# Patient Record
Sex: FEMALE | Race: OTHER | Employment: FULL TIME | URBAN - METROPOLITAN AREA
[De-identification: names, ages, dates, MRNs, and addresses within clinical notes are randomized per-mention and may not be internally consistent; named-entity substitution may affect disease eponyms.]

---

## 2017-02-23 ENCOUNTER — HOSPITAL ENCOUNTER (OUTPATIENT)
Age: 22
Discharge: HOME | End: 2017-02-23
Payer: COMMERCIAL

## 2017-02-24 LAB — RUBV IGG SER QL: 250.8 IU/ML

## 2017-02-24 PROCEDURE — 86787 VARICELLA-ZOSTER ANTIBODY: CPT

## 2017-02-24 PROCEDURE — 86735 MUMPS ANTIBODY: CPT

## 2017-02-24 PROCEDURE — 86481 TB AG RESPONSE T-CELL SUSP: CPT

## 2017-02-24 PROCEDURE — 86765 RUBEOLA ANTIBODY: CPT

## 2017-02-24 PROCEDURE — 86762 RUBELLA ANTIBODY: CPT

## 2017-02-27 LAB
MUV IGG SER QL: 2.67
T-SPOT TB TEST: NORMAL

## 2017-02-28 ENCOUNTER — OFFICE VISIT (OUTPATIENT)
Dept: OBGYN | Facility: CLINIC | Age: 22
End: 2017-02-28

## 2017-02-28 ENCOUNTER — PROCEDURE VISIT (OUTPATIENT)
Dept: OBGYN | Facility: CLINIC | Age: 22
End: 2017-02-28

## 2017-02-28 VITALS
BODY MASS INDEX: 20.12 KG/M2 | HEIGHT: 61 IN | WEIGHT: 106.6 LBS | SYSTOLIC BLOOD PRESSURE: 102 MMHG | DIASTOLIC BLOOD PRESSURE: 80 MMHG

## 2017-02-28 DIAGNOSIS — R10.2 PELVIC PAIN IN FEMALE: Primary | ICD-10-CM

## 2017-02-28 DIAGNOSIS — Z97.5 IUD (INTRAUTERINE DEVICE) IN PLACE: ICD-10-CM

## 2017-02-28 LAB
MEASLES IMMUNE (IGG): 3.27
VZV IGG SER QL IA: 4.16

## 2017-02-28 PROCEDURE — 99212 OFFICE O/P EST SF 10 MIN: CPT | Performed by: NURSE PRACTITIONER

## 2017-02-28 RX ORDER — IBUPROFEN 800 MG/1
800 TABLET ORAL EVERY 8 HOURS PRN
Qty: 120 TABLET | Refills: 1 | Status: SHIPPED | OUTPATIENT
Start: 2017-02-28 | End: 2019-06-26 | Stop reason: CLARIF

## 2017-03-14 ENCOUNTER — PROCEDURE VISIT (OUTPATIENT)
Dept: OBGYN CLINIC | Age: 22
End: 2017-03-14
Payer: COMMERCIAL

## 2017-03-14 VITALS
WEIGHT: 109.2 LBS | DIASTOLIC BLOOD PRESSURE: 60 MMHG | HEIGHT: 61 IN | SYSTOLIC BLOOD PRESSURE: 122 MMHG | BODY MASS INDEX: 20.62 KG/M2

## 2017-03-14 DIAGNOSIS — Z30.432 ENCOUNTER FOR IUD REMOVAL: Primary | ICD-10-CM

## 2017-03-14 DIAGNOSIS — N92.1 MENORRHAGIA WITH IRREGULAR CYCLE: ICD-10-CM

## 2017-03-14 DIAGNOSIS — N94.6 DYSMENORRHEA: ICD-10-CM

## 2017-03-14 PROCEDURE — 58301 REMOVE INTRAUTERINE DEVICE: CPT | Performed by: NURSE PRACTITIONER

## 2017-08-16 ENCOUNTER — HOSPITAL ENCOUNTER (OUTPATIENT)
Age: 22
Setting detail: SPECIMEN
Discharge: HOME | End: 2017-08-16
Payer: COMMERCIAL

## 2017-08-16 ENCOUNTER — OFFICE VISIT (OUTPATIENT)
Dept: OBGYN CLINIC | Age: 22
End: 2017-08-16
Payer: COMMERCIAL

## 2017-08-16 VITALS
DIASTOLIC BLOOD PRESSURE: 70 MMHG | SYSTOLIC BLOOD PRESSURE: 110 MMHG | BODY MASS INDEX: 20.96 KG/M2 | WEIGHT: 111 LBS | HEIGHT: 61 IN

## 2017-08-16 DIAGNOSIS — Z01.419 PAP SMEAR, AS PART OF ROUTINE GYNECOLOGICAL EXAMINATION: Primary | ICD-10-CM

## 2017-08-16 DIAGNOSIS — Z30.41 SURVEILLANCE FOR BIRTH CONTROL, ORAL CONTRACEPTIVES: ICD-10-CM

## 2017-08-16 PROCEDURE — 99395 PREV VISIT EST AGE 18-39: CPT | Performed by: NURSE PRACTITIONER

## 2017-08-16 ASSESSMENT — ENCOUNTER SYMPTOMS
SHORTNESS OF BREATH: 0
COUGH: 0
COLOR CHANGE: 0
DIARRHEA: 0
NAUSEA: 0
BLOOD IN STOOL: 0
ABDOMINAL DISTENTION: 0
BACK PAIN: 0
CONSTIPATION: 0
WHEEZING: 0
ABDOMINAL PAIN: 0
CHEST TIGHTNESS: 0
VOMITING: 0
PHOTOPHOBIA: 0

## 2017-08-17 LAB
DIRECT EXAM: NORMAL
Lab: NORMAL
SPECIMEN DESCRIPTION: NORMAL
STATUS: NORMAL

## 2017-08-18 LAB
HPV SAMPLE: NORMAL
HPV SOURCE: NORMAL
HPV, GENOTYPE 16: NOT DETECTED
HPV, GENOTYPE 18: NOT DETECTED
HPV, HIGH RISK OTHER: NOT DETECTED
HPV, INTERPRETATION: NORMAL

## 2017-08-21 LAB — CYTOLOGY REPORT: NORMAL

## 2017-10-05 ENCOUNTER — TELEPHONE (OUTPATIENT)
Dept: OBGYN CLINIC | Age: 22
End: 2017-10-05

## 2017-10-05 NOTE — TELEPHONE ENCOUNTER
It is not normal after the period it usually starts before the period.   She may have depression she needs to follow with PCp try a different medication

## 2017-10-05 NOTE — TELEPHONE ENCOUNTER
Mickie Allyson called in stating she has been having depression symptoms right after her period; wanted to know if it is normal with the birth control she is on? She was previously on arpita and had mood swings and saw her PCP. Her PCP put her on zoloft and she stopped taking it because it did not agree with her body. She wanted me to get you a message to see if this is normal and what to do further. Her birth control was recent switched I believe in August...

## 2018-04-16 ENCOUNTER — HOSPITAL ENCOUNTER (OUTPATIENT)
Dept: GENERAL RADIOLOGY | Facility: CLINIC | Age: 23
Discharge: HOME | End: 2018-04-18
Payer: COMMERCIAL

## 2018-04-16 ENCOUNTER — TELEPHONE (OUTPATIENT)
Dept: FAMILY MEDICINE CLINIC | Age: 23
End: 2018-04-16

## 2018-04-16 ENCOUNTER — HOSPITAL ENCOUNTER (OUTPATIENT)
Facility: CLINIC | Age: 23
Discharge: HOME | End: 2018-04-18
Payer: COMMERCIAL

## 2018-04-16 DIAGNOSIS — M79.672 FOOT PAIN, LEFT: ICD-10-CM

## 2018-04-16 DIAGNOSIS — M79.672 FOOT PAIN, LEFT: Primary | ICD-10-CM

## 2018-04-16 PROCEDURE — 73630 X-RAY EXAM OF FOOT: CPT

## 2018-04-18 ENCOUNTER — TELEPHONE (OUTPATIENT)
Dept: OBGYN CLINIC | Age: 23
End: 2018-04-18

## 2018-08-21 ENCOUNTER — OFFICE VISIT (OUTPATIENT)
Dept: OBGYN CLINIC | Age: 23
End: 2018-08-21
Payer: COMMERCIAL

## 2018-08-21 ENCOUNTER — HOSPITAL ENCOUNTER (OUTPATIENT)
Age: 23
Setting detail: SPECIMEN
Discharge: HOME | End: 2018-08-21
Payer: COMMERCIAL

## 2018-08-21 VITALS
HEIGHT: 61 IN | RESPIRATION RATE: 18 BRPM | DIASTOLIC BLOOD PRESSURE: 60 MMHG | HEART RATE: 76 BPM | BODY MASS INDEX: 22.84 KG/M2 | SYSTOLIC BLOOD PRESSURE: 116 MMHG | WEIGHT: 121 LBS

## 2018-08-21 DIAGNOSIS — Z30.41 SURVEILLANCE FOR BIRTH CONTROL, ORAL CONTRACEPTIVES: ICD-10-CM

## 2018-08-21 DIAGNOSIS — Z01.419 PAP SMEAR, AS PART OF ROUTINE GYNECOLOGICAL EXAMINATION: Primary | ICD-10-CM

## 2018-08-21 PROCEDURE — 99395 PREV VISIT EST AGE 18-39: CPT | Performed by: NURSE PRACTITIONER

## 2018-08-21 ASSESSMENT — ENCOUNTER SYMPTOMS
DIARRHEA: 0
COUGH: 0
VOICE CHANGE: 0
PHOTOPHOBIA: 0
TROUBLE SWALLOWING: 0
STRIDOR: 0
WHEEZING: 0
VOMITING: 0
NAUSEA: 0
BACK PAIN: 0
CONSTIPATION: 0
SORE THROAT: 0
COLOR CHANGE: 0
ABDOMINAL PAIN: 0
ABDOMINAL DISTENTION: 0
SHORTNESS OF BREATH: 0

## 2018-09-10 LAB — CYTOLOGY REPORT: NORMAL

## 2018-10-10 DIAGNOSIS — F41.9 ANXIETY: Primary | ICD-10-CM

## 2018-10-10 RX ORDER — BUSPIRONE HYDROCHLORIDE 5 MG/1
5 TABLET ORAL 2 TIMES DAILY
Qty: 60 TABLET | Refills: 5 | Status: SHIPPED | OUTPATIENT
Start: 2018-10-10 | End: 2019-02-11 | Stop reason: SDUPTHER

## 2018-10-10 RX ORDER — BUSPIRONE HYDROCHLORIDE 5 MG/1
5 TABLET ORAL 2 TIMES DAILY
Qty: 60 TABLET | Refills: 5 | Status: SHIPPED | OUTPATIENT
Start: 2018-10-10 | End: 2018-10-10 | Stop reason: CLARIF

## 2019-01-02 DIAGNOSIS — R09.89 CHEST CONGESTION: ICD-10-CM

## 2019-01-02 DIAGNOSIS — J01.00 ACUTE NON-RECURRENT MAXILLARY SINUSITIS: ICD-10-CM

## 2019-01-02 DIAGNOSIS — R05.9 COUGH: Primary | ICD-10-CM

## 2019-01-02 RX ORDER — AZITHROMYCIN 250 MG/1
TABLET, FILM COATED ORAL
Qty: 1 PACKET | Refills: 0 | Status: SHIPPED | OUTPATIENT
Start: 2019-01-02 | End: 2019-06-26 | Stop reason: CLARIF

## 2019-01-02 RX ORDER — BROMPHENIRAMINE MALEATE, PSEUDOEPHEDRINE HYDROCHLORIDE, AND DEXTROMETHORPHAN HYDROBROMIDE 2; 30; 10 MG/5ML; MG/5ML; MG/5ML
5 SYRUP ORAL 4 TIMES DAILY PRN
Qty: 180 ML | Refills: 2 | Status: SHIPPED | OUTPATIENT
Start: 2019-01-02 | End: 2019-06-26 | Stop reason: CLARIF

## 2019-01-02 RX ORDER — FLUTICASONE PROPIONATE 50 MCG
1 SPRAY, SUSPENSION (ML) NASAL 2 TIMES DAILY
Qty: 1 BOTTLE | Refills: 2 | Status: SHIPPED | OUTPATIENT
Start: 2019-01-02 | End: 2019-06-26 | Stop reason: CLARIF

## 2019-02-11 DIAGNOSIS — F41.9 ANXIETY: ICD-10-CM

## 2019-02-11 RX ORDER — BUSPIRONE HYDROCHLORIDE 5 MG/1
5 TABLET ORAL 2 TIMES DAILY
Qty: 60 TABLET | Refills: 5 | Status: SHIPPED | OUTPATIENT
Start: 2019-02-11 | End: 2019-03-13

## 2019-04-28 ENCOUNTER — OFFICE VISIT (OUTPATIENT)
Dept: FAMILY MEDICINE CLINIC | Age: 24
End: 2019-04-28
Payer: COMMERCIAL

## 2019-04-28 DIAGNOSIS — T14.8XXA BONE BRUISE: ICD-10-CM

## 2019-04-28 DIAGNOSIS — S69.91XA INJURY OF RIGHT WRIST, INITIAL ENCOUNTER: Primary | ICD-10-CM

## 2019-04-28 PROCEDURE — 99213 OFFICE O/P EST LOW 20 MIN: CPT | Performed by: NURSE PRACTITIONER

## 2019-04-28 RX ORDER — BUSPIRONE HYDROCHLORIDE 5 MG/1
5 TABLET ORAL 2 TIMES DAILY
COMMUNITY
End: 2020-08-05 | Stop reason: SDUPTHER

## 2019-04-28 RX ORDER — ACETAMINOPHEN AND CODEINE PHOSPHATE 300; 30 MG/1; MG/1
1 TABLET ORAL EVERY 4 HOURS PRN
Qty: 18 TABLET | Refills: 0 | Status: SHIPPED | OUTPATIENT
Start: 2019-04-28 | End: 2019-04-29

## 2019-04-28 ASSESSMENT — PATIENT HEALTH QUESTIONNAIRE - PHQ9
2. FEELING DOWN, DEPRESSED OR HOPELESS: 0
SUM OF ALL RESPONSES TO PHQ QUESTIONS 1-9: 0
1. LITTLE INTEREST OR PLEASURE IN DOING THINGS: 0
SUM OF ALL RESPONSES TO PHQ9 QUESTIONS 1 & 2: 0
SUM OF ALL RESPONSES TO PHQ QUESTIONS 1-9: 0

## 2019-04-29 ASSESSMENT — ENCOUNTER SYMPTOMS
CONSTIPATION: 0
DIARRHEA: 0
SORE THROAT: 0
NAUSEA: 1
EYE DISCHARGE: 0
SHORTNESS OF BREATH: 0
COUGH: 0
RHINORRHEA: 0
EYE REDNESS: 0
ABDOMINAL PAIN: 0
EYE ITCHING: 0

## 2019-04-30 NOTE — PROGRESS NOTES
[address] [facility]   [address] [address]  Dept: [phone]  Dept Fax: [phone]    Kayla Michel is a 26 y.o. female who presents today forher medical conditions/complaints as noted below. Kayla Michel is c/o of   Chief Complaint   Patient presents with    Wrist Pain     right wrist pain x one day, Patient fell on wrist     HPI:     Patient presents in office today after injuring her right wrist. Patient was at home just hours ago trying to carry her dog up the stairs and slipped. Wanted to protect her dog so fall was not broken by out streched hand, instead her right wrist was hit on stair and this is what occurred. Pain is severe, tried to take both ibuprofen and tylenol without relief. Xray preliminary doesn't show anything significant. Hand is cold and hard to move. No swelling or redness. Doesn't like pain medication but worried about going to work on Tuesday if it isn't fractured. 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 No other concerns right now.        Past Medical History:   Diagnosis Date    Dysmenorrhea     Menorrhagia       Past Surgical History:   Procedure Laterality Date    ENDOMETRIAL BIOPSY  5/5/16    INTRAUTERINE DEVICE INSERTION  5/5/16     [place]    INTRAUTERINE DEVICE REMOVAL  03/14/2017       Family History   Problem Relation Age of Onset    High Blood Pressure Paternal Grandmother     Heart Disease Paternal Grandmother     Alcohol Abuse Mother     Hypertension Father     Diabetes Father         borderline    Cancer Paternal Aunt 61        [place]    Arthritis Maternal Grandmother     Osteoporosis Maternal Grandmother     Osteoarthritis Maternal Grandmother     Heart Disease Paternal Grandfather     High Blood Pressure Paternal Grandfather        Social History     Tobacco Use    Smoking status: Never Smoker    Smokeless tobacco: Never Used   Substance Use Topics    Alcohol use: Yes     Alcohol/week: 0.0 oz      Current Outpatient Medications   Medication Sig Dispense Refill    busPIRone (BUSPAR) 5 MG tablet Take 5 mg by mouth 2 times daily      norethindrone-ethinyl estradiol (Martha Lang 1/35, 28,) 1-35 MG-MCG per tablet Take 1 tablet by mouth daily 28 tablet 11    ibuprofen (ADVIL;MOTRIN) 800 MG tablet Take 1 tablet by mouth every 8 hours as needed for Pain 120 tablet 1    azithromycin (ZITHROMAX Z-RAHEEM) 250 MG tablet Take 2 tablets (500 mg) on Day 1, and then take 1 tablet (250 mg) on days 2 through 5. 1 packet 0    brompheniramine-pseudoephedrine-DM 30-2-10 MG/5ML syrup Take 5 mLs by mouth 4 times daily as needed for Cough 180 mL 2    fluticasone (FLONASE) 50 MCG/ACT nasal spray 1 spray by Nasal route 2 times daily for 14 days 1 Bottle 2     No current facility-administered medications for this visit. No Known Allergies        Subjective:      Review of Systems   Constitutional: Positive for activity change (due to pain). Negative for appetite change, fatigue and fever. HENT: Negative for congestion, ear pain, postnasal drip, rhinorrhea and sore throat. Eyes: Negative for discharge, redness and itching. Respiratory: Negative for cough and shortness of breath. Cardiovascular: Negative for chest pain. Gastrointestinal: Positive for nausea (slightly due to pain). Negative for abdominal pain, constipation and diarrhea. Genitourinary: Negative for dysuria. Musculoskeletal: Positive for arthralgias (right wrist since injury). Negative for myalgias. Skin: Negative for rash. Neurological: Negative for dizziness, light-headedness and headaches. Psychiatric/Behavioral: Negative for dysphoric mood and sleep disturbance. The patient is not nervous/anxious. Objective:      Physical Exam   Constitutional: She is oriented to person, place, and time. She appears well-developed and well-nourished. She is active. No distress.    HENT:   Head: Normocephalic and atraumatic. Right Ear: Tympanic membrane and external ear normal.   Left Ear: Tympanic membrane and external ear normal.   Nose: Nose normal.   Mouth/Throat: Uvula is midline and oropharynx is clear and moist. No oropharyngeal exudate. Eyes: Pupils are equal, round, and reactive to light. Right eye exhibits no discharge. Left eye exhibits no discharge. Cardiovascular: Normal rate, regular rhythm and normal heart sounds. No murmur heard. Pulses:       Radial pulses are 2+ on the right side, and 2+ on the left side. Pulmonary/Chest: Effort normal and breath sounds normal. No respiratory distress. She has no decreased breath sounds. She has no wheezes. Abdominal: Soft. Bowel sounds are normal.   Musculoskeletal:        Right wrist: She exhibits decreased range of motion, tenderness and bony tenderness. She exhibits no swelling, no effusion, no crepitus and no deformity. No visible red or swollen joints to bilateral upper and lower extremities. Right wrist is significantly cooler than left, pulses intact, tremor lightly on assessment, extremely tender to touch on medical aspect of wrist in AP, no swelling, redness, deformity noticed. Tearful on assessment. Neurological: She is alert and oriented to person, place, and time. She has normal strength. Skin: Skin is warm, dry and intact. No rash noted. Psychiatric: She has a normal mood and affect. Her speech is normal and behavior is normal.   Vitals reviewed. There were no vitals taken for this visit. Assessment:       Diagnosis Orders   1. Injury of right wrist, initial encounter  XR WRIST RIGHT (MIN 3 VIEWS)    DISCONTINUED: acetaminophen-codeine (TYLENOL/CODEINE #3) 300-30 MG per tablet   2. Bone bruise             Plan:     Problem List     None        MA did not place vitals in visit before completion, will work on getting these updated. Xray was non concerning.    Recommended a wrist splint due to severe pain especially to be worn for work.   Jessi Paredes, finalized and no fracture. Okay to take pain medication OTC, prescription available for  if this doesn't provide relief or needs it at work. Alert if symptoms don't continue or improve. Encouraged healthy diet and exercise. Call office with any new or worsening symptoms or concerns. Patient given educationalmaterials - see patient instructions. Discussed use, benefit, and side effectsof prescribed medications. All patient questions answered. Pt verbalized understanding. Instructed to continue current medications, diet and exercise. Patient agreedwith treatment plan. Follow up as directed.      Electronically signed by TERRENCE Clark CNP on 4/29/2019 at 8:30 PM

## 2019-06-27 ENCOUNTER — HOSPITAL ENCOUNTER (OUTPATIENT)
Age: 24
Setting detail: SPECIMEN
Discharge: HOME | End: 2019-06-27
Payer: COMMERCIAL

## 2019-07-29 ENCOUNTER — HOSPITAL ENCOUNTER (OUTPATIENT)
Age: 24
Setting detail: SPECIMEN
Discharge: HOME | End: 2019-07-29
Payer: COMMERCIAL

## 2019-07-29 DIAGNOSIS — Z02.0 SCHOOL PHYSICAL EXAM: ICD-10-CM

## 2019-07-29 LAB — HBV SURFACE AB TITR SER: 4.06 MIU/ML

## 2019-08-01 LAB — T-SPOT TB TEST: NORMAL

## 2019-08-21 ASSESSMENT — ENCOUNTER SYMPTOMS
COLOR CHANGE: 0
CONSTIPATION: 0
NAUSEA: 0
RHINORRHEA: 0
DIARRHEA: 0
ABDOMINAL PAIN: 0
SHORTNESS OF BREATH: 0
VOMITING: 0
BACK PAIN: 0
COUGH: 0

## 2019-08-22 ENCOUNTER — OFFICE VISIT (OUTPATIENT)
Dept: OBGYN CLINIC | Age: 24
End: 2019-08-22
Payer: COMMERCIAL

## 2019-08-22 ENCOUNTER — HOSPITAL ENCOUNTER (OUTPATIENT)
Age: 24
Setting detail: SPECIMEN
Discharge: HOME | End: 2019-08-22
Payer: COMMERCIAL

## 2019-08-22 VITALS — WEIGHT: 128 LBS | BODY MASS INDEX: 23.86 KG/M2 | DIASTOLIC BLOOD PRESSURE: 62 MMHG | SYSTOLIC BLOOD PRESSURE: 110 MMHG

## 2019-08-22 DIAGNOSIS — Z01.419 VISIT FOR GYNECOLOGIC EXAMINATION: Primary | ICD-10-CM

## 2019-08-22 DIAGNOSIS — N92.6 IRREGULAR MENSTRUAL BLEEDING: ICD-10-CM

## 2019-08-22 LAB
DIRECT EXAM: NORMAL
Lab: NORMAL
SPECIMEN DESCRIPTION: NORMAL

## 2019-08-22 PROCEDURE — 99395 PREV VISIT EST AGE 18-39: CPT | Performed by: NURSE PRACTITIONER

## 2019-08-22 NOTE — PATIENT INSTRUCTIONS
license by [facility] ([facility]). If you have questions about a medical condition or this instruction, always ask your healthcare professional. [address] any warranty or liability for your use of this information. Pap Test: Care Instructions  Your Care Instructions    The Pap test (also called a Pap smear) is a screening test for cancer of the cervix, which is the lower part of the uterus that opens into the vagina. The test can help your doctor find early changes in the cells that could lead to cancer. The sample of cells taken during your test has been sent to a lab so that an expert can look at the cells. It usually takes a week or two to get the results back. Follow-up care is a key part of your treatment and safety. Be sure to make and go to all appointments, and call your doctor if you are having problems. It's also a good idea to know your test results and keep a list of the medicines you take. What do the results mean? · A normal result means that the test did not find any abnormal cells in the sample. · An abnormal result can mean many things. Most of these are not cancer. The results of your test may be abnormal because:  ? You have an infection of the vagina or cervix, such as a yeast infection. ? You have an IUD (intrauterine device for birth control). ? You have low estrogen levels after menopause that are causing the cells to change. ? You have cell changes that may be a sign of precancer or cancer. The results are ranked based on how serious the changes might be. There are many other reasons why you might not get a normal result. If the results were abnormal, you may need to get another test within a few weeks or months. If the results show changes that could be a sign of cancer, you may need a test called a colposcopy, which provides a more complete view of the cervix.   Sometimes the lab cannot use the sample because it does not contain enough cells or was not preserved well. If so, you may need to have the test again. This is not common, but it does happen from time to time. When should you call for help? Watch closely for changes in your health, and be sure to contact your doctor if:    · You have vaginal bleeding or pain for more than 2 days after the test. It is normal to have a small amount of bleeding for a day or two after the test.   Where can you learn more? Go to [email or web address] and sign in to your [employer] account. Enter E173 in the [employer] box to learn more about \"Pap Test: Care Instructions. \"     If you do not have an account, please click on the \"Sign Up Now\" link. Current as of: December 19, 2018  Content Version: 12.1  © 0826-9335 Healthwise, Incorporated. Care instructions adapted under license by [facility] ([facility]). If you have questions about a medical condition or this instruction, always ask your healthcare professional. [place] [address] any warranty or liability for your use of this information.

## 2019-08-23 LAB
CHLAMYDIA BY THIN PREP: NEGATIVE
N. GONORRHOEAE DNA, THIN PREP: NEGATIVE
SPECIMEN DESCRIPTION: NORMAL

## 2019-08-29 LAB — CYTOLOGY REPORT: NORMAL

## 2019-09-16 ENCOUNTER — HOSPITAL ENCOUNTER (OUTPATIENT)
Age: 24
Setting detail: SPECIMEN
Discharge: HOME | End: 2019-09-16
Payer: COMMERCIAL

## 2019-09-16 DIAGNOSIS — Z23 NEED FOR TUBERCULOSIS VACCINATION: ICD-10-CM

## 2019-09-16 DIAGNOSIS — R89.9 ABNORMAL LABORATORY TEST RESULT: ICD-10-CM

## 2019-09-26 ENCOUNTER — HOSPITAL ENCOUNTER (OUTPATIENT)
Age: 24
Discharge: HOME | End: 2019-09-26
Payer: COMMERCIAL

## 2020-02-25 ENCOUNTER — APPOINTMENT (OUTPATIENT)
Dept: GENERAL RADIOLOGY | Age: 25
End: 2020-02-25
Payer: COMMERCIAL

## 2020-02-25 ENCOUNTER — HOSPITAL ENCOUNTER (EMERGENCY)
Age: 25
Discharge: HOME | End: 2020-02-25
Attending: EMERGENCY MEDICINE
Payer: COMMERCIAL

## 2020-02-25 VITALS
BODY MASS INDEX: 24.55 KG/M2 | TEMPERATURE: 97.9 F | SYSTOLIC BLOOD PRESSURE: 128 MMHG | HEIGHT: 61 IN | DIASTOLIC BLOOD PRESSURE: 82 MMHG | HEART RATE: 77 BPM | RESPIRATION RATE: 16 BRPM | OXYGEN SATURATION: 99 % | WEIGHT: 130 LBS

## 2020-02-25 PROCEDURE — 71046 X-RAY EXAM CHEST 2 VIEWS: CPT

## 2020-02-25 PROCEDURE — 99283 EMERGENCY DEPT VISIT LOW MDM: CPT

## 2020-02-25 PROCEDURE — 70360 X-RAY EXAM OF NECK: CPT

## 2020-02-25 PROCEDURE — 6370000000 HC RX 637 (ALT 250 FOR IP): Performed by: EMERGENCY MEDICINE

## 2020-02-25 RX ORDER — PREDNISONE 10 MG/1
TABLET ORAL
Qty: 20 TABLET | Refills: 0 | Status: SHIPPED | OUTPATIENT
Start: 2020-02-25 | End: 2020-03-06

## 2020-02-25 RX ORDER — PREDNISONE 20 MG/1
60 TABLET ORAL ONCE
Status: COMPLETED | OUTPATIENT
Start: 2020-02-25 | End: 2020-02-25

## 2020-02-25 RX ORDER — AZITHROMYCIN 250 MG/1
TABLET, FILM COATED ORAL
Qty: 1 PACKET | Refills: 0 | Status: SHIPPED | OUTPATIENT
Start: 2020-02-25 | End: 2020-03-06

## 2020-02-25 RX ADMIN — PREDNISONE 60 MG: 20 TABLET ORAL at 19:51

## 2020-02-25 ASSESSMENT — ENCOUNTER SYMPTOMS
SHORTNESS OF BREATH: 0
NAUSEA: 0
ABDOMINAL PAIN: 0
BACK PAIN: 0
CONSTIPATION: 0
SINUS PRESSURE: 1
TROUBLE SWALLOWING: 1
COUGH: 0
COLOR CHANGE: 0
SORE THROAT: 0
DIARRHEA: 0
VOMITING: 0
BLOOD IN STOOL: 0

## 2020-02-26 NOTE — ED PROVIDER NOTES
[address]  eMERGENCY dEPARTMENT eNCOUnter    Pt Name: Fredy Herzog  MRN: [id number]  YOB: 1995  Date of evaluation:2/25/20  PCP: Shan Coelho PA-C    CHIEF COMPLAINT       Chief Complaint   Patient presents with    Allergic Reaction       HISTORY OF PRESENT ILLNESS    Fredy Herzog is a 22 y.o. female who presents with a chief complaint of difficulty swallowing. Patient states she was recently diagnosed with a sinus infection yesterday. She was started on amoxicillin. States she took a total of 2 doses today. After the second dose she stated that she felt like she could not swallow and like her throat was closing. She took 1 dose of oral Benadryl at home. Denies any rashes. Denies any chest pain or any actual real difficulty breathing. Has not been wheezing or heard any high-pitched stridorous sounds. Patient has no known allergies. She has had a azithromycin before but does not think she has been on any other antibiotics in the past.  She is still complaining of nasal congestion, sinus pressure but has no other new complaints in regards to that. Symptoms are acute. Symptoms are moderate. Nothing else make symptoms better or worse. Patient has no other complaints at this time. REVIEW OF SYSTEMS       Review of Systems   Constitutional: Negative for chills, fatigue and fever. HENT: Positive for congestion, sinus pressure and trouble swallowing. Negative for ear pain and sore throat. Eyes: Negative for visual disturbance. Respiratory: Negative for cough and shortness of breath. Cardiovascular: Negative for chest pain, palpitations and leg swelling. Gastrointestinal: Negative for abdominal pain, blood in stool, constipation, diarrhea, nausea and vomiting. Genitourinary: Negative for dysuria and flank pain. Musculoskeletal: Negative for arthralgias, back pain, myalgias and neck pain. Skin: Negative for color change, rash and wound.    Neurological: Negative for not think patient has a epiglottitis, retropharyngeal abscess or anything else like that however based on her description of her symptoms I am going to get x-ray of her soft tissues of her neck and chest x-ray. We will also give oral steroids here. She has had Benadryl prior to arrival.    DIAGNOSTIC RESULTS     EKG: All EKG's are interpreted by the Emergency Department Physician who either signs or Co-signs this chart in the absence of a cardiologist.        RADIOLOGY:   I directly visualized the following  images and reviewed the radiologist interpretations:  XR Neck Soft Tissue   Final Result   No radiographic abnormality of the soft tissues of the neck. XR CHEST STANDARD (2 VW)   Final Result   No evidence for acute cardiopulmonary pathology. ED BEDSIDE ULTRASOUND:      LABS:  Labs Reviewed - No data to display      EMERGENCY DEPARTMENT COURSE:   Vitals:    Vitals:    02/25/20 1929 02/25/20 2121   BP: (!) 158/103 128/82   Pulse: 92 77   Resp: 26 16   Temp: 97.9 °F (36.6 °C)    TempSrc: Oral    SpO2: 100% 99%   Weight: 130 lb (59 kg)    Height: 5' 1\" (1.549 m)      11:12 PM  Patient feels a lot better at this time. She is not feeling like she cannot swallow or cannot breathe at all at this time. X-rays are unremarkable. Again there are still no rashes, any other signs of allergic reaction. Regardless I told the patient to stop taking amoxicillin, will switch her antibiotic to azithromycin. She was advised to call her PCP and let them know she was seen for potential allergic reaction. Advised her not to take penicillins until further notice or until she is seen by an allergist.    I advised her to return if any symptoms worsen. Patient's agreeable plan will be discharged home today. CRITICALCARE:      CONSULTS:  None      PROCEDURES:      FINAL IMPRESSION      1. Allergic reaction, initial encounter    2.  Upper respiratory tract infection, unspecified type

## 2020-05-13 ENCOUNTER — TELEPHONE (OUTPATIENT)
Dept: OBGYN CLINIC | Age: 25
End: 2020-05-13

## 2020-05-13 RX ORDER — SULFAMETHOXAZOLE AND TRIMETHOPRIM 800; 160 MG/1; MG/1
1 TABLET ORAL 2 TIMES DAILY
Qty: 20 TABLET | Refills: 0 | Status: SHIPPED | OUTPATIENT
Start: 2020-05-13 | End: 2020-05-23

## 2020-07-22 ENCOUNTER — HOSPITAL ENCOUNTER (OUTPATIENT)
Age: 25
Discharge: HOME | End: 2020-07-22
Payer: COMMERCIAL

## 2020-07-22 PROCEDURE — U0003 INFECTIOUS AGENT DETECTION BY NUCLEIC ACID (DNA OR RNA); SEVERE ACUTE RESPIRATORY SYNDROME CORONAVIRUS 2 (SARS-COV-2) (CORONAVIRUS DISEASE [COVID-19]), AMPLIFIED PROBE TECHNIQUE, MAKING USE OF HIGH THROUGHPUT TECHNOLOGIES AS DESCRIBED BY CMS-2020-01-R: HCPCS

## 2020-07-28 LAB — SARS-COV-2, NAA: DETECTED

## 2020-07-29 ENCOUNTER — TELEPHONE (OUTPATIENT)
Dept: ADMINISTRATIVE | Age: 25
End: 2020-07-29

## 2020-08-01 ENCOUNTER — APPOINTMENT (OUTPATIENT)
Dept: CT IMAGING | Age: 25
End: 2020-08-01
Payer: COMMERCIAL

## 2020-08-01 ENCOUNTER — HOSPITAL ENCOUNTER (EMERGENCY)
Age: 25
Discharge: HOME | End: 2020-08-01
Attending: EMERGENCY MEDICINE
Payer: COMMERCIAL

## 2020-08-01 VITALS
BODY MASS INDEX: 24.58 KG/M2 | SYSTOLIC BLOOD PRESSURE: 115 MMHG | WEIGHT: 130 LBS | OXYGEN SATURATION: 98 % | TEMPERATURE: 98.9 F | HEART RATE: 90 BPM | RESPIRATION RATE: 18 BRPM | DIASTOLIC BLOOD PRESSURE: 77 MMHG

## 2020-08-01 LAB
ABSOLUTE EOS #: 0.1 K/UL (ref 0–0.4)
ABSOLUTE IMMATURE GRANULOCYTE: NORMAL K/UL (ref 0–0.3)
ABSOLUTE LYMPH #: 2.2 K/UL (ref 1–4.8)
ABSOLUTE MONO #: 0.5 K/UL (ref 0.1–1.3)
ALBUMIN SERPL-MCNC: 4.4 G/DL (ref 3.5–5.2)
ALBUMIN/GLOBULIN RATIO: ABNORMAL (ref 1–2.5)
ALP BLD-CCNC: 70 U/L (ref 35–104)
ALT SERPL-CCNC: 42 U/L (ref 5–33)
ANION GAP SERPL CALCULATED.3IONS-SCNC: 14 MMOL/L (ref 9–17)
AST SERPL-CCNC: 22 U/L
BASOPHILS # BLD: 1 % (ref 0–2)
BASOPHILS ABSOLUTE: 0 K/UL (ref 0–0.2)
BILIRUB SERPL-MCNC: 0.39 MG/DL (ref 0.3–1.2)
BUN BLDV-MCNC: 9 MG/DL (ref 6–20)
BUN/CREAT BLD: ABNORMAL (ref 9–20)
C-REACTIVE PROTEIN: <0.3 MG/L (ref 0–5)
CALCIUM SERPL-MCNC: 9.8 MG/DL (ref 8.6–10.4)
CHLORIDE BLD-SCNC: 100 MMOL/L (ref 98–107)
CO2: 24 MMOL/L (ref 20–31)
CREAT SERPL-MCNC: 0.71 MG/DL (ref 0.5–0.9)
D-DIMER QUANTITATIVE: <0.27 MG/L FEU (ref 0–0.59)
DIFFERENTIAL TYPE: NORMAL
EOSINOPHILS RELATIVE PERCENT: 2 % (ref 0–4)
GFR AFRICAN AMERICAN: >60 ML/MIN
GFR NON-AFRICAN AMERICAN: >60 ML/MIN
GFR SERPL CREATININE-BSD FRML MDRD: ABNORMAL ML/MIN/{1.73_M2}
GFR SERPL CREATININE-BSD FRML MDRD: ABNORMAL ML/MIN/{1.73_M2}
GLUCOSE BLD-MCNC: 86 MG/DL (ref 70–99)
HCG QUALITATIVE: NEGATIVE
HCT VFR BLD CALC: 43.5 % (ref 36–46)
HEMOGLOBIN: 15.3 G/DL (ref 12–16)
IMMATURE GRANULOCYTES: NORMAL %
INR BLD: 1
LACTATE DEHYDROGENASE: 124 U/L (ref 135–214)
LIPASE: 26 U/L (ref 13–60)
LYMPHOCYTES # BLD: 34 % (ref 24–44)
MCH RBC QN AUTO: 30.7 PG (ref 26–34)
MCHC RBC AUTO-ENTMCNC: 35.3 G/DL (ref 31–37)
MCV RBC AUTO: 87 FL (ref 80–100)
MONOCYTES # BLD: 7 % (ref 1–7)
NRBC AUTOMATED: NORMAL PER 100 WBC
PARTIAL THROMBOPLASTIN TIME: 33.4 SEC (ref 24–36)
PDW BLD-RTO: 14 % (ref 11.5–14.9)
PLATELET # BLD: 347 K/UL (ref 150–450)
PLATELET ESTIMATE: NORMAL
PMV BLD AUTO: 8.1 FL (ref 6–12)
POTASSIUM SERPL-SCNC: 3.9 MMOL/L (ref 3.7–5.3)
PROCALCITONIN: 0.08 NG/ML
PROTHROMBIN TIME: 13.1 SEC (ref 11.8–14.6)
RBC # BLD: 5 M/UL (ref 4–5.2)
RBC # BLD: NORMAL 10*6/UL
SEG NEUTROPHILS: 56 % (ref 36–66)
SEGMENTED NEUTROPHILS ABSOLUTE COUNT: 3.7 K/UL (ref 1.3–9.1)
SODIUM BLD-SCNC: 138 MMOL/L (ref 135–144)
TOTAL PROTEIN: 7.6 G/DL (ref 6.4–8.3)
TROPONIN INTERP: NORMAL
TROPONIN INTERP: NORMAL
TROPONIN T: NORMAL NG/ML
TROPONIN T: NORMAL NG/ML
TROPONIN, HIGH SENSITIVITY: <6 NG/L (ref 0–14)
TROPONIN, HIGH SENSITIVITY: <6 NG/L (ref 0–14)
WBC # BLD: 6.6 K/UL (ref 3.5–11)
WBC # BLD: NORMAL 10*3/UL

## 2020-08-01 PROCEDURE — 99285 EMERGENCY DEPT VISIT HI MDM: CPT

## 2020-08-01 PROCEDURE — 85730 THROMBOPLASTIN TIME PARTIAL: CPT

## 2020-08-01 PROCEDURE — 2580000003 HC RX 258: Performed by: EMERGENCY MEDICINE

## 2020-08-01 PROCEDURE — 85025 COMPLETE CBC W/AUTO DIFF WBC: CPT

## 2020-08-01 PROCEDURE — 84703 CHORIONIC GONADOTROPIN ASSAY: CPT

## 2020-08-01 PROCEDURE — 85610 PROTHROMBIN TIME: CPT

## 2020-08-01 PROCEDURE — 84145 PROCALCITONIN (PCT): CPT

## 2020-08-01 PROCEDURE — 83690 ASSAY OF LIPASE: CPT

## 2020-08-01 PROCEDURE — 80053 COMPREHEN METABOLIC PANEL: CPT

## 2020-08-01 PROCEDURE — 36415 COLL VENOUS BLD VENIPUNCTURE: CPT

## 2020-08-01 PROCEDURE — 83615 LACTATE (LD) (LDH) ENZYME: CPT

## 2020-08-01 PROCEDURE — 84484 ASSAY OF TROPONIN QUANT: CPT

## 2020-08-01 PROCEDURE — 93005 ELECTROCARDIOGRAM TRACING: CPT | Performed by: EMERGENCY MEDICINE

## 2020-08-01 PROCEDURE — 71260 CT THORAX DX C+: CPT

## 2020-08-01 PROCEDURE — 86140 C-REACTIVE PROTEIN: CPT

## 2020-08-01 PROCEDURE — 6360000004 HC RX CONTRAST MEDICATION: Performed by: EMERGENCY MEDICINE

## 2020-08-01 PROCEDURE — 85379 FIBRIN DEGRADATION QUANT: CPT

## 2020-08-01 RX ORDER — 0.9 % SODIUM CHLORIDE 0.9 %
80 INTRAVENOUS SOLUTION INTRAVENOUS ONCE
Status: COMPLETED | OUTPATIENT
Start: 2020-08-01 | End: 2020-08-01

## 2020-08-01 RX ORDER — SODIUM CHLORIDE 0.9 % (FLUSH) 0.9 %
10 SYRINGE (ML) INJECTION 2 TIMES DAILY
Status: DISCONTINUED | OUTPATIENT
Start: 2020-08-01 | End: 2020-08-01 | Stop reason: HOSPADM

## 2020-08-01 RX ADMIN — Medication 10 ML: at 13:01

## 2020-08-01 RX ADMIN — SODIUM CHLORIDE 80 ML: 9 INJECTION, SOLUTION INTRAVENOUS at 13:01

## 2020-08-01 RX ADMIN — IOVERSOL 75 ML: 741 INJECTION INTRA-ARTERIAL; INTRAVENOUS at 13:01

## 2020-08-01 ASSESSMENT — ENCOUNTER SYMPTOMS
BLOOD IN STOOL: 0
CONSTIPATION: 0
SHORTNESS OF BREATH: 1
DIARRHEA: 0
NAUSEA: 0
COUGH: 1
COLOR CHANGE: 0
TROUBLE SWALLOWING: 0
SORE THROAT: 0
BACK PAIN: 0
ABDOMINAL PAIN: 0
VOMITING: 0

## 2020-08-01 ASSESSMENT — PAIN DESCRIPTION - FREQUENCY: FREQUENCY: CONTINUOUS

## 2020-08-01 ASSESSMENT — PAIN DESCRIPTION - DESCRIPTORS: DESCRIPTORS: BURNING

## 2020-08-01 ASSESSMENT — PAIN DESCRIPTION - LOCATION: LOCATION: CHEST

## 2020-08-01 ASSESSMENT — PAIN DESCRIPTION - ORIENTATION: ORIENTATION: LEFT;RIGHT

## 2020-08-01 ASSESSMENT — PAIN SCALES - GENERAL: PAINLEVEL_OUTOF10: 8

## 2020-08-01 ASSESSMENT — PAIN DESCRIPTION - PAIN TYPE: TYPE: ACUTE PAIN

## 2020-08-01 NOTE — ED PROVIDER NOTES
[address]  eMERGENCY dEPARTMENT eNCOUnter    Pt Name: Alejandra Mullen  MRN: [id number]  YOB: 1995  Date of evaluation:8/1/20  PCP: Arturo Giordano PA-C    CHIEF COMPLAINT       Chief Complaint   Patient presents with    Shortness of Breath    Chest Pain     burning in chest    Concern For COVID-19       HISTORY OF PRESENT ILLNESS    Alejandra Mullen is a 26 y.o. female who presents with a chief complaint of left-sided chest pain. Also complained of difficulty breathing. Patient is known to be positive for COVID-19. She states that starting last night she started having chest pain on her left side that wraps around her left flank to her back. Inspiration is making her symptoms worse. Also complained of a cough, subjective fever. No GI symptoms. No history of DVT or PE. No recent leg swelling. Symptoms are acute. Symptoms are severe. Nothing make symptoms better or worse other than stated above. Patient has no other complaints at this time. REVIEW OF SYSTEMS       Review of Systems   Constitutional: Positive for fatigue and fever. Negative for chills. HENT: Negative for congestion, ear pain, sore throat and trouble swallowing. Eyes: Negative for visual disturbance. Respiratory: Positive for cough and shortness of breath. Cardiovascular: Positive for chest pain. Negative for palpitations and leg swelling. Gastrointestinal: Negative for abdominal pain, blood in stool, constipation, diarrhea, nausea and vomiting. Genitourinary: Negative for dysuria and flank pain. Musculoskeletal: Negative for arthralgias, back pain, myalgias and neck pain. Skin: Negative for color change, rash and wound. Neurological: Negative for dizziness, weakness, light-headedness, numbness and headaches. Psychiatric/Behavioral: Negative for confusion. All other systems reviewed and are negative. Negativein 10 essential Systems except as mentioned above and in the HPI.         PAST MEDICAL HISTORY     Past Medical History:   Diagnosis Date    Dysmenorrhea     Menorrhagia          SURGICAL HISTORY      has a past surgical history that includes intrauterine device insertion (16 ); Endometrial biopsy (16); and Intrauterine Device Removal (2017). CURRENT MEDICATIONS       Current Discharge Medication List      CONTINUE these medications which have NOT CHANGED    Details   albuterol sulfate HFA (PROVENTIL HFA) 108 (90 Base) MCG/ACT inhaler Inhale 2 puffs into the lungs every 6 hours as needed for Wheezing or Shortness of Breath  Qty: 1 Inhaler, Refills: 0    Associated Diagnoses: COVID-19 virus infection      benzonatate (TESSALON) 100 MG capsule Take 1-2 capsules by mouth 3 times daily as needed for Cough  Qty: 30 capsule, Refills: 0    Associated Diagnoses: COVID-19 virus infection      norethindrone-ethinyl estradiol (Rashmi Kingston , ,) 1-35 MG-MCG per tablet Take 1 tablet by mouth daily  Qty: 28 tablet, Refills: 1      ondansetron (ZOFRAN) 4 MG tablet Take 1 tablet by mouth every 8 hours as needed for Nausea or Vomiting  Qty: 20 tablet, Refills: 0      Lactobacillus (PROBIOTIC ACIDOPHILUS PO) Take by mouth      fluticasone (FLONASE) 50 MCG/ACT nasal spray 1 spray by Each Nostril route daily      Fexofenadine HCl (ALLEGRA ALLERGY PO) Take by mouth      busPIRone (BUSPAR) 5 MG tablet Take 5 mg by mouth 2 times daily             ALLERGIES     is allergic to amoxil [amoxicillin]. FAMILY HISTORY     She indicated that her mother is alive. She indicated that her father is alive. She indicated that her maternal grandmother is alive. She indicated that the status of her maternal grandfather is unknown. She indicated that her paternal grandmother is alive. She indicated that her paternal grandfather is alive. She indicated that her paternal aunt is .      family history includes Alcohol Abuse in her mother; Arthritis in her maternal grandmother; Cancer (age of onset: 61) in her Not in any acute distress. I am concerned for pneumonia, pneumothorax, PE, unlikely dissection. Myocarditis is a possibility as well. We will get lab work, CT scan of the chest.    DIAGNOSTIC RESULTS     EKG: All EKG's are interpreted by the Emergency Department Physician who either signs or Co-signs this chart in the absence of a cardiologist.    EKG Interpretation    Interpreted by me    Rhythm: normal sinus   Rate: normal  Axis: normal  Ectopy: none  Conduction: normal  ST Segments: no acute change  T Waves: no acute change  Q Waves: none    Clinical Impression: no acute changes and normal EKG    RADIOLOGY:   I directly visualized the following  images and reviewed the radiologist interpretations:  CT CHEST PULMONARY EMBOLISM W CONTRAST   Final Result   *No evidence of pulmonary embolism or acute pulmonary abnormality. *2 enhancing lesions are identified within the right lobe of the liver,   largest measuring 3.4 cm. Complete evaluation with CT or MRI utilizing liver   mass protocol is recommended. ED BEDSIDE ULTRASOUND:      LABS:  Labs Reviewed   COMPREHENSIVE METABOLIC PANEL - Abnormal; Notable for the following components:       Result Value    ALT 42 (*)     All other components within normal limits   LACTATE DEHYDROGENASE - Abnormal; Notable for the following components:     (*)     All other components within normal limits   TROPONIN   TROPONIN   CBC WITH AUTO DIFFERENTIAL   LIPASE   D-DIMER, QUANTITATIVE   PROTIME-INR   APTT   HCG, SERUM, QUALITATIVE   PROCALCITONIN   C-REACTIVE PROTEIN         EMERGENCY DEPARTMENT COURSE:   Vitals:    Vitals:    08/01/20 1315 08/01/20 1330 08/01/20 1345 08/01/20 1400   BP: 113/71 122/75 139/83 99/70   Pulse: 82 92 103 81   Resp: 16 15 10 19   Temp:       TempSrc:       SpO2: 98% 98% 98% 97%   Weight:         3:00 PM EDT  CT scan shows no evidence of PE. She does have liver lesions noted.   Patient advised to follow-up with her PCP about this.  Blood work is unremarkable. She is not any respiratory distress. She has no evidence of pneumonia, pneumothorax. Advised to keep taking symptomatic relief at home and to self isolate herself at home is much as possible. Advised return for breathing worsens. She is agreeable with plan will be discharged home today. CRITICALCARE:      CONSULTS:  None      PROCEDURES:      FINAL IMPRESSION      1. Chest pain, unspecified type    2.  COVID-19            DISPOSITION/PLAN   DISPOSITION Decision To Discharge 08/01/2020 02:46:35 PM        PATIENT REFERRED TO:  Renea Irby PA-C  [address] [facility]  [address] [facility]  [address] [facility] [address]  [phone]    Schedule an appointment as soon as possible for a visit       [facility] ED  [facility] [address]  [address] [facility]  [phone]    As needed, If symptoms worsen      DISCHARGE MEDICATIONS:  Current Discharge Medication List          (Please note that portions ofthis note were completed with a voice recognition program.  Efforts were made to edit the dictations but occasionally words are mis-transcribed.)    Ana Peralta DO  Attending Emergency Physician          Ana Peralta DO  08/01/20 1500

## 2020-08-03 ENCOUNTER — CARE COORDINATION (OUTPATIENT)
Dept: CARE COORDINATION | Age: 25
End: 2020-08-03

## 2020-08-03 LAB
EKG ATRIAL RATE: 91 BPM
EKG P AXIS: 63 DEGREES
EKG P-R INTERVAL: 130 MS
EKG Q-T INTERVAL: 352 MS
EKG QRS DURATION: 80 MS
EKG QTC CALCULATION (BAZETT): 432 MS
EKG R AXIS: 44 DEGREES
EKG T AXIS: 55 DEGREES
EKG VENTRICULAR RATE: 91 BPM

## 2020-08-03 PROCEDURE — 93010 ELECTROCARDIOGRAM REPORT: CPT | Performed by: INTERNAL MEDICINE

## 2020-08-03 NOTE — CARE COORDINATION
Patient contacted regarding [id number] diagnosis\". Discussed COVID-19 related testing which was available at this time. Test results were positive. Patient informed of results, if available? Patient aware of positive results from 2020     Care Transition Nurse/ Ambulatory Care Manager contacted the patient by telephone to perform post discharge assessment. Verified name and  with patient as identifiers. Provided introduction to self, and explanation of the CTN/ACM role, and reason for call due to risk factors for infection and/or exposure to COVID-19. Symptoms reviewed with patient who verbalized the following symptoms: cough, shortness of breath, no new symptoms, no worsening symptoms and chest burning. Due to no new or worsening symptoms encounter was not routed to provider for escalation. Discussed follow-up appointments. If no appointment was previously scheduled, appointment scheduling offered: patient has VV with PCP 2020  [facility] follow up appointment(s):   Future Appointments   Date Time Provider Danni Salgado   2020  1:30 PM Bubba Tadeo [address] [facility]   2020  9:30 AM Andre Landau, APRN - CNP Pola Masters [facility]     Non-[facility] follow up appointment(s): no     Advance Care Planning:   Does patient have an Advance Directive:  did not review. Patient has following risk factors of: no known risk factors. CTN/ACM reviewed discharge instructions, medical action plan and red flags such as increased shortness of breath, increasing fever and signs of decompensation with patient who verbalized understanding. Discussed exposure protocols and quarantine with CDC Guidelines What to do if you are sick with coronavirus disease .  Patient was given an opportunity for questions and concerns.  The patient agrees to contact the [facility] exposure line [phone], local health department [place] Department of Health: ([phone]) and PCP office for questions related to

## 2020-08-05 ENCOUNTER — CARE COORDINATION (OUTPATIENT)
Dept: CARE COORDINATION | Age: 25
End: 2020-08-05

## 2020-08-09 ENCOUNTER — HOSPITAL ENCOUNTER (EMERGENCY)
Age: 25
Discharge: ANOTHER HOSPITAL | End: 2020-08-10
Attending: EMERGENCY MEDICINE
Payer: COMMERCIAL

## 2020-08-09 ENCOUNTER — APPOINTMENT (OUTPATIENT)
Dept: CT IMAGING | Age: 25
End: 2020-08-09
Payer: COMMERCIAL

## 2020-08-09 LAB
ABSOLUTE EOS #: 0.1 K/UL (ref 0–0.4)
ABSOLUTE IMMATURE GRANULOCYTE: ABNORMAL K/UL (ref 0–0.3)
ABSOLUTE LYMPH #: 3 K/UL (ref 1–4.8)
ABSOLUTE MONO #: 0.7 K/UL (ref 0.1–1.3)
ALBUMIN SERPL-MCNC: 4.3 G/DL (ref 3.5–5.2)
ALBUMIN/GLOBULIN RATIO: ABNORMAL (ref 1–2.5)
ALP BLD-CCNC: 59 U/L (ref 35–104)
ALT SERPL-CCNC: 23 U/L (ref 5–33)
ANION GAP SERPL CALCULATED.3IONS-SCNC: 10 MMOL/L (ref 9–17)
AST SERPL-CCNC: 18 U/L
BASOPHILS # BLD: 1 % (ref 0–2)
BASOPHILS ABSOLUTE: 0 K/UL (ref 0–0.2)
BILIRUB SERPL-MCNC: 0.17 MG/DL (ref 0.3–1.2)
BUN BLDV-MCNC: 10 MG/DL (ref 6–20)
BUN/CREAT BLD: ABNORMAL (ref 9–20)
CALCIUM SERPL-MCNC: 8.8 MG/DL (ref 8.6–10.4)
CHLORIDE BLD-SCNC: 105 MMOL/L (ref 98–107)
CO2: 23 MMOL/L (ref 20–31)
CREAT SERPL-MCNC: 0.67 MG/DL (ref 0.5–0.9)
D-DIMER QUANTITATIVE: <0.27 MG/L FEU (ref 0–0.59)
DIFFERENTIAL TYPE: ABNORMAL
EOSINOPHILS RELATIVE PERCENT: 1 % (ref 0–4)
FIBRINOGEN: 292 MG/DL (ref 210–530)
GFR AFRICAN AMERICAN: >60 ML/MIN
GFR NON-AFRICAN AMERICAN: >60 ML/MIN
GFR SERPL CREATININE-BSD FRML MDRD: ABNORMAL ML/MIN/{1.73_M2}
GFR SERPL CREATININE-BSD FRML MDRD: ABNORMAL ML/MIN/{1.73_M2}
GLUCOSE BLD-MCNC: 122 MG/DL (ref 70–99)
HCG QUALITATIVE: NEGATIVE
HCT VFR BLD CALC: 39.8 % (ref 36–46)
HEMOGLOBIN: 13.6 G/DL (ref 12–16)
IMMATURE GRANULOCYTES: ABNORMAL %
INR BLD: 1
LACTATE DEHYDROGENASE: 112 U/L (ref 135–214)
LYMPHOCYTES # BLD: 35 % (ref 24–44)
MCH RBC QN AUTO: 29.6 PG (ref 26–34)
MCHC RBC AUTO-ENTMCNC: 34.1 G/DL (ref 31–37)
MCV RBC AUTO: 86.7 FL (ref 80–100)
MONOCYTES # BLD: 8 % (ref 1–7)
NRBC AUTOMATED: ABNORMAL PER 100 WBC
PARTIAL THROMBOPLASTIN TIME: 32.4 SEC (ref 24–36)
PDW BLD-RTO: 13.7 % (ref 11.5–14.9)
PLATELET # BLD: 382 K/UL (ref 150–450)
PLATELET ESTIMATE: ABNORMAL
PMV BLD AUTO: 8.7 FL (ref 6–12)
POTASSIUM SERPL-SCNC: 3.7 MMOL/L (ref 3.7–5.3)
PROTHROMBIN TIME: 13.1 SEC (ref 11.8–14.6)
RBC # BLD: 4.59 M/UL (ref 4–5.2)
RBC # BLD: ABNORMAL 10*6/UL
SEG NEUTROPHILS: 55 % (ref 36–66)
SEGMENTED NEUTROPHILS ABSOLUTE COUNT: 4.7 K/UL (ref 1.3–9.1)
SODIUM BLD-SCNC: 138 MMOL/L (ref 135–144)
TOTAL PROTEIN: 7.2 G/DL (ref 6.4–8.3)
TROPONIN INTERP: NORMAL
TROPONIN T: NORMAL NG/ML
TROPONIN, HIGH SENSITIVITY: <6 NG/L (ref 0–14)
WBC # BLD: 8.5 K/UL (ref 3.5–11)
WBC # BLD: ABNORMAL 10*3/UL

## 2020-08-09 PROCEDURE — 2580000003 HC RX 258: Performed by: EMERGENCY MEDICINE

## 2020-08-09 PROCEDURE — 82728 ASSAY OF FERRITIN: CPT

## 2020-08-09 PROCEDURE — 85025 COMPLETE CBC W/AUTO DIFF WBC: CPT

## 2020-08-09 PROCEDURE — 80053 COMPREHEN METABOLIC PANEL: CPT

## 2020-08-09 PROCEDURE — 99285 EMERGENCY DEPT VISIT HI MDM: CPT

## 2020-08-09 PROCEDURE — 96374 THER/PROPH/DIAG INJ IV PUSH: CPT

## 2020-08-09 PROCEDURE — 71260 CT THORAX DX C+: CPT

## 2020-08-09 PROCEDURE — 36415 COLL VENOUS BLD VENIPUNCTURE: CPT

## 2020-08-09 PROCEDURE — 84484 ASSAY OF TROPONIN QUANT: CPT

## 2020-08-09 PROCEDURE — 85379 FIBRIN DEGRADATION QUANT: CPT

## 2020-08-09 PROCEDURE — 85730 THROMBOPLASTIN TIME PARTIAL: CPT

## 2020-08-09 PROCEDURE — 84703 CHORIONIC GONADOTROPIN ASSAY: CPT

## 2020-08-09 PROCEDURE — 85384 FIBRINOGEN ACTIVITY: CPT

## 2020-08-09 PROCEDURE — 6360000002 HC RX W HCPCS: Performed by: EMERGENCY MEDICINE

## 2020-08-09 PROCEDURE — 83615 LACTATE (LD) (LDH) ENZYME: CPT

## 2020-08-09 PROCEDURE — 6370000000 HC RX 637 (ALT 250 FOR IP): Performed by: EMERGENCY MEDICINE

## 2020-08-09 PROCEDURE — 6360000004 HC RX CONTRAST MEDICATION: Performed by: EMERGENCY MEDICINE

## 2020-08-09 PROCEDURE — 85610 PROTHROMBIN TIME: CPT

## 2020-08-09 PROCEDURE — 93005 ELECTROCARDIOGRAM TRACING: CPT | Performed by: EMERGENCY MEDICINE

## 2020-08-09 RX ORDER — 0.9 % SODIUM CHLORIDE 0.9 %
1000 INTRAVENOUS SOLUTION INTRAVENOUS ONCE
Status: COMPLETED | OUTPATIENT
Start: 2020-08-09 | End: 2020-08-10

## 2020-08-09 RX ORDER — 0.9 % SODIUM CHLORIDE 0.9 %
80 INTRAVENOUS SOLUTION INTRAVENOUS ONCE
Status: COMPLETED | OUTPATIENT
Start: 2020-08-09 | End: 2020-08-09

## 2020-08-09 RX ORDER — SODIUM CHLORIDE 0.9 % (FLUSH) 0.9 %
10 SYRINGE (ML) INJECTION ONCE
Status: COMPLETED | OUTPATIENT
Start: 2020-08-09 | End: 2020-08-09

## 2020-08-09 RX ORDER — KETOROLAC TROMETHAMINE 30 MG/ML
30 INJECTION, SOLUTION INTRAMUSCULAR; INTRAVENOUS ONCE
Status: COMPLETED | OUTPATIENT
Start: 2020-08-09 | End: 2020-08-09

## 2020-08-09 RX ORDER — ASPIRIN 81 MG/1
324 TABLET, CHEWABLE ORAL ONCE
Status: COMPLETED | OUTPATIENT
Start: 2020-08-09 | End: 2020-08-09

## 2020-08-09 RX ADMIN — Medication 10 ML: at 22:51

## 2020-08-09 RX ADMIN — SODIUM CHLORIDE 80 ML: 9 INJECTION, SOLUTION INTRAVENOUS at 22:51

## 2020-08-09 RX ADMIN — KETOROLAC TROMETHAMINE 30 MG: 30 INJECTION, SOLUTION INTRAMUSCULAR at 21:58

## 2020-08-09 RX ADMIN — ASPIRIN 81 MG CHEWABLE TABLET 324 MG: 81 TABLET CHEWABLE at 23:42

## 2020-08-09 RX ADMIN — IOVERSOL 75 ML: 741 INJECTION INTRA-ARTERIAL; INTRAVENOUS at 22:51

## 2020-08-09 RX ADMIN — SODIUM CHLORIDE 1000 ML: 9 INJECTION, SOLUTION INTRAVENOUS at 23:42

## 2020-08-09 ASSESSMENT — PAIN SCALES - GENERAL
PAINLEVEL_OUTOF10: 8
PAINLEVEL_OUTOF10: 8

## 2020-08-09 ASSESSMENT — PAIN DESCRIPTION - FREQUENCY: FREQUENCY: CONTINUOUS

## 2020-08-09 ASSESSMENT — PAIN DESCRIPTION - PAIN TYPE: TYPE: ACUTE PAIN

## 2020-08-09 ASSESSMENT — PAIN DESCRIPTION - ORIENTATION: ORIENTATION: MID

## 2020-08-09 ASSESSMENT — PAIN DESCRIPTION - LOCATION: LOCATION: CHEST

## 2020-08-10 ENCOUNTER — HOSPITAL ENCOUNTER (INPATIENT)
Age: 25
LOS: 1 days | Discharge: HOME | DRG: 179 | End: 2020-08-11
Attending: INTERNAL MEDICINE | Admitting: INTERNAL MEDICINE
Payer: COMMERCIAL

## 2020-08-10 VITALS
BODY MASS INDEX: 24.55 KG/M2 | HEIGHT: 61 IN | WEIGHT: 130 LBS | OXYGEN SATURATION: 98 % | DIASTOLIC BLOOD PRESSURE: 97 MMHG | TEMPERATURE: 98.2 F | RESPIRATION RATE: 20 BRPM | SYSTOLIC BLOOD PRESSURE: 136 MMHG | HEART RATE: 88 BPM

## 2020-08-10 PROBLEM — F41.9 ANXIETY: Status: ACTIVE | Noted: 2020-08-10

## 2020-08-10 PROBLEM — R07.89 ATYPICAL CHEST PAIN: Status: ACTIVE | Noted: 2020-08-10

## 2020-08-10 PROBLEM — R00.0 SINUS TACHYCARDIA: Status: ACTIVE | Noted: 2020-08-10

## 2020-08-10 PROBLEM — U07.1 COVID-19: Status: ACTIVE | Noted: 2020-08-10

## 2020-08-10 LAB
ABSOLUTE EOS #: 0.11 K/UL (ref 0–0.44)
ABSOLUTE IMMATURE GRANULOCYTE: 0.03 K/UL (ref 0–0.3)
ABSOLUTE LYMPH #: 2.78 K/UL (ref 1.1–3.7)
ABSOLUTE MONO #: 0.59 K/UL (ref 0.1–1.2)
ALBUMIN SERPL-MCNC: 3.7 G/DL (ref 3.5–5.2)
ALBUMIN/GLOBULIN RATIO: 1.7 (ref 1–2.5)
ALP BLD-CCNC: 50 U/L (ref 35–104)
ALT SERPL-CCNC: 22 U/L (ref 5–33)
ANION GAP SERPL CALCULATED.3IONS-SCNC: 11 MMOL/L (ref 9–17)
AST SERPL-CCNC: 16 U/L
BASOPHILS # BLD: 1 % (ref 0–2)
BASOPHILS ABSOLUTE: 0.04 K/UL (ref 0–0.2)
BILIRUB SERPL-MCNC: 0.23 MG/DL (ref 0.3–1.2)
BUN BLDV-MCNC: 6 MG/DL (ref 6–20)
BUN/CREAT BLD: ABNORMAL (ref 9–20)
C-REACTIVE PROTEIN: 0.5 MG/L (ref 0–5)
CALCIUM SERPL-MCNC: 8 MG/DL (ref 8.6–10.4)
CHLORIDE BLD-SCNC: 105 MMOL/L (ref 98–107)
CO2: 23 MMOL/L (ref 20–31)
CREAT SERPL-MCNC: 0.55 MG/DL (ref 0.5–0.9)
D-DIMER QUANTITATIVE: <0.17 MG/L FEU
DIFFERENTIAL TYPE: NORMAL
EKG ATRIAL RATE: 73 BPM
EKG P AXIS: 49 DEGREES
EKG P-R INTERVAL: 164 MS
EKG Q-T INTERVAL: 388 MS
EKG QRS DURATION: 74 MS
EKG QTC CALCULATION (BAZETT): 427 MS
EKG R AXIS: 24 DEGREES
EKG T AXIS: 34 DEGREES
EKG VENTRICULAR RATE: 73 BPM
EOSINOPHILS RELATIVE PERCENT: 1 % (ref 1–4)
FERRITIN: 28 UG/L (ref 13–150)
FERRITIN: 34 UG/L (ref 13–150)
FIBRINOGEN: 255 MG/DL (ref 140–420)
GFR AFRICAN AMERICAN: >60 ML/MIN
GFR NON-AFRICAN AMERICAN: >60 ML/MIN
GFR SERPL CREATININE-BSD FRML MDRD: ABNORMAL ML/MIN/{1.73_M2}
GFR SERPL CREATININE-BSD FRML MDRD: ABNORMAL ML/MIN/{1.73_M2}
GLUCOSE BLD-MCNC: 87 MG/DL (ref 70–99)
HCT VFR BLD CALC: 37.4 % (ref 36.3–47.1)
HEMOGLOBIN: 12.2 G/DL (ref 11.9–15.1)
IMMATURE GRANULOCYTES: 0 %
INR BLD: 1
LACTATE DEHYDROGENASE: 90 U/L (ref 135–214)
LYMPHOCYTES # BLD: 35 % (ref 24–43)
MCH RBC QN AUTO: 28.9 PG (ref 25.2–33.5)
MCHC RBC AUTO-ENTMCNC: 32.6 G/DL (ref 28.4–34.8)
MCV RBC AUTO: 88.6 FL (ref 82.6–102.9)
MONOCYTES # BLD: 7 % (ref 3–12)
NRBC AUTOMATED: 0 PER 100 WBC
PARTIAL THROMBOPLASTIN TIME: 28.8 SEC (ref 20.5–30.5)
PDW BLD-RTO: 13.1 % (ref 11.8–14.4)
PLATELET # BLD: 325 K/UL (ref 138–453)
PLATELET ESTIMATE: NORMAL
PMV BLD AUTO: 10.5 FL (ref 8.1–13.5)
POTASSIUM SERPL-SCNC: 3.9 MMOL/L (ref 3.7–5.3)
PROCALCITONIN: 0.04 NG/ML
PROTHROMBIN TIME: 10.6 SEC (ref 9–12)
RBC # BLD: 4.22 M/UL (ref 3.95–5.11)
RBC # BLD: NORMAL 10*6/UL
SEG NEUTROPHILS: 56 % (ref 36–65)
SEGMENTED NEUTROPHILS ABSOLUTE COUNT: 4.49 K/UL (ref 1.5–8.1)
SODIUM BLD-SCNC: 139 MMOL/L (ref 135–144)
TOTAL PROTEIN: 5.9 G/DL (ref 6.4–8.3)
TROPONIN INTERP: NORMAL
TROPONIN T: NORMAL NG/ML
TROPONIN, HIGH SENSITIVITY: <6 NG/L (ref 0–14)
WBC # BLD: 8 K/UL (ref 3.5–11.3)
WBC # BLD: NORMAL 10*3/UL

## 2020-08-10 PROCEDURE — 83615 LACTATE (LD) (LDH) ENZYME: CPT

## 2020-08-10 PROCEDURE — 6360000002 HC RX W HCPCS: Performed by: NURSE PRACTITIONER

## 2020-08-10 PROCEDURE — 6360000002 HC RX W HCPCS: Performed by: EMERGENCY MEDICINE

## 2020-08-10 PROCEDURE — 93005 ELECTROCARDIOGRAM TRACING: CPT | Performed by: NURSE PRACTITIONER

## 2020-08-10 PROCEDURE — 99223 1ST HOSP IP/OBS HIGH 75: CPT | Performed by: INTERNAL MEDICINE

## 2020-08-10 PROCEDURE — 80053 COMPREHEN METABOLIC PANEL: CPT

## 2020-08-10 PROCEDURE — 82728 ASSAY OF FERRITIN: CPT

## 2020-08-10 PROCEDURE — 6370000000 HC RX 637 (ALT 250 FOR IP): Performed by: INTERNAL MEDICINE

## 2020-08-10 PROCEDURE — 86038 ANTINUCLEAR ANTIBODIES: CPT

## 2020-08-10 PROCEDURE — 85025 COMPLETE CBC W/AUTO DIFF WBC: CPT

## 2020-08-10 PROCEDURE — 96375 TX/PRO/DX INJ NEW DRUG ADDON: CPT

## 2020-08-10 PROCEDURE — 85379 FIBRIN DEGRADATION QUANT: CPT

## 2020-08-10 PROCEDURE — APPSS45 APP SPLIT SHARED TIME 31-45 MINUTES: Performed by: NURSE PRACTITIONER

## 2020-08-10 PROCEDURE — 6370000000 HC RX 637 (ALT 250 FOR IP): Performed by: NURSE PRACTITIONER

## 2020-08-10 PROCEDURE — 85384 FIBRINOGEN ACTIVITY: CPT

## 2020-08-10 PROCEDURE — 1200000000 HC SEMI PRIVATE

## 2020-08-10 PROCEDURE — 93010 ELECTROCARDIOGRAM REPORT: CPT | Performed by: INTERNAL MEDICINE

## 2020-08-10 PROCEDURE — 85610 PROTHROMBIN TIME: CPT

## 2020-08-10 PROCEDURE — 84145 PROCALCITONIN (PCT): CPT

## 2020-08-10 PROCEDURE — 86140 C-REACTIVE PROTEIN: CPT

## 2020-08-10 PROCEDURE — 85730 THROMBOPLASTIN TIME PARTIAL: CPT

## 2020-08-10 PROCEDURE — 2580000003 HC RX 258: Performed by: NURSE PRACTITIONER

## 2020-08-10 PROCEDURE — 99254 IP/OBS CNSLTJ NEW/EST MOD 60: CPT | Performed by: INTERNAL MEDICINE

## 2020-08-10 RX ORDER — FAMOTIDINE 20 MG/1
20 TABLET, FILM COATED ORAL 2 TIMES DAILY
Status: DISCONTINUED | OUTPATIENT
Start: 2020-08-10 | End: 2020-08-10

## 2020-08-10 RX ORDER — ACETAMINOPHEN 325 MG/1
650 TABLET ORAL EVERY 6 HOURS PRN
Status: DISCONTINUED | OUTPATIENT
Start: 2020-08-10 | End: 2020-08-10 | Stop reason: SDUPTHER

## 2020-08-10 RX ORDER — ACETAMINOPHEN 650 MG/1
650 SUPPOSITORY RECTAL EVERY 6 HOURS PRN
Status: DISCONTINUED | OUTPATIENT
Start: 2020-08-10 | End: 2020-08-11 | Stop reason: HOSPADM

## 2020-08-10 RX ORDER — PROMETHAZINE HYDROCHLORIDE 25 MG/1
12.5 TABLET ORAL EVERY 6 HOURS PRN
Status: DISCONTINUED | OUTPATIENT
Start: 2020-08-10 | End: 2020-08-11 | Stop reason: HOSPADM

## 2020-08-10 RX ORDER — LACTOBACILLUS RHAMNOSUS GG 10B CELL
1 CAPSULE ORAL DAILY
Status: DISCONTINUED | OUTPATIENT
Start: 2020-08-10 | End: 2020-08-11 | Stop reason: HOSPADM

## 2020-08-10 RX ORDER — OXYCODONE HYDROCHLORIDE 5 MG/1
5 TABLET ORAL EVERY 4 HOURS PRN
Status: DISCONTINUED | OUTPATIENT
Start: 2020-08-10 | End: 2020-08-11 | Stop reason: HOSPADM

## 2020-08-10 RX ORDER — MORPHINE SULFATE 4 MG/ML
4 INJECTION, SOLUTION INTRAMUSCULAR; INTRAVENOUS ONCE
Status: COMPLETED | OUTPATIENT
Start: 2020-08-10 | End: 2020-08-10

## 2020-08-10 RX ORDER — GUAIFENESIN DEXTROMETHORPHAN HYDROBROMIDE ORAL SOLUTION 10; 100 MG/5ML; MG/5ML
10 SOLUTION ORAL EVERY 4 HOURS PRN
Status: DISCONTINUED | OUTPATIENT
Start: 2020-08-10 | End: 2020-08-11 | Stop reason: HOSPADM

## 2020-08-10 RX ORDER — OXYCODONE HYDROCHLORIDE 5 MG/1
10 TABLET ORAL EVERY 4 HOURS PRN
Status: DISCONTINUED | OUTPATIENT
Start: 2020-08-10 | End: 2020-08-11 | Stop reason: HOSPADM

## 2020-08-10 RX ORDER — SODIUM CHLORIDE 9 MG/ML
INJECTION, SOLUTION INTRAVENOUS CONTINUOUS
Status: DISCONTINUED | OUTPATIENT
Start: 2020-08-10 | End: 2020-08-11 | Stop reason: HOSPADM

## 2020-08-10 RX ORDER — SODIUM CHLORIDE 0.9 % (FLUSH) 0.9 %
10 SYRINGE (ML) INJECTION EVERY 12 HOURS SCHEDULED
Status: DISCONTINUED | OUTPATIENT
Start: 2020-08-10 | End: 2020-08-11 | Stop reason: HOSPADM

## 2020-08-10 RX ORDER — ONDANSETRON 2 MG/ML
4 INJECTION INTRAMUSCULAR; INTRAVENOUS EVERY 6 HOURS PRN
Status: DISCONTINUED | OUTPATIENT
Start: 2020-08-10 | End: 2020-08-11 | Stop reason: HOSPADM

## 2020-08-10 RX ORDER — BUSPIRONE HYDROCHLORIDE 5 MG/1
5 TABLET ORAL 2 TIMES DAILY
Status: DISCONTINUED | OUTPATIENT
Start: 2020-08-10 | End: 2020-08-11 | Stop reason: HOSPADM

## 2020-08-10 RX ORDER — DOCUSATE SODIUM 100 MG/1
100 CAPSULE, LIQUID FILLED ORAL 2 TIMES DAILY PRN
Status: DISCONTINUED | OUTPATIENT
Start: 2020-08-10 | End: 2020-08-11 | Stop reason: HOSPADM

## 2020-08-10 RX ORDER — SODIUM CHLORIDE 0.9 % (FLUSH) 0.9 %
10 SYRINGE (ML) INJECTION PRN
Status: DISCONTINUED | OUTPATIENT
Start: 2020-08-10 | End: 2020-08-11 | Stop reason: HOSPADM

## 2020-08-10 RX ORDER — TRAMADOL HYDROCHLORIDE 50 MG/1
50 TABLET ORAL EVERY 6 HOURS PRN
Status: DISCONTINUED | OUTPATIENT
Start: 2020-08-10 | End: 2020-08-11 | Stop reason: HOSPADM

## 2020-08-10 RX ORDER — ACETAMINOPHEN 650 MG/1
650 SUPPOSITORY RECTAL EVERY 6 HOURS PRN
Status: DISCONTINUED | OUTPATIENT
Start: 2020-08-10 | End: 2020-08-10 | Stop reason: SDUPTHER

## 2020-08-10 RX ORDER — NICOTINE 21 MG/24HR
1 PATCH, TRANSDERMAL 24 HOURS TRANSDERMAL DAILY PRN
Status: DISCONTINUED | OUTPATIENT
Start: 2020-08-10 | End: 2020-08-11 | Stop reason: HOSPADM

## 2020-08-10 RX ORDER — ACETAMINOPHEN 325 MG/1
650 TABLET ORAL EVERY 6 HOURS PRN
Status: DISCONTINUED | OUTPATIENT
Start: 2020-08-10 | End: 2020-08-11 | Stop reason: HOSPADM

## 2020-08-10 RX ORDER — PANTOPRAZOLE SODIUM 40 MG/1
40 TABLET, DELAYED RELEASE ORAL
Status: DISCONTINUED | OUTPATIENT
Start: 2020-08-10 | End: 2020-08-11 | Stop reason: HOSPADM

## 2020-08-10 RX ORDER — POLYETHYLENE GLYCOL 3350 17 G/17G
17 POWDER, FOR SOLUTION ORAL DAILY PRN
Status: DISCONTINUED | OUTPATIENT
Start: 2020-08-10 | End: 2020-08-11 | Stop reason: HOSPADM

## 2020-08-10 RX ADMIN — ENOXAPARIN SODIUM 30 MG: 30 INJECTION SUBCUTANEOUS at 09:02

## 2020-08-10 RX ADMIN — DEXTROMETHORPHAN HYDROBROMIDE AND GUAIFENESIN 10 ML: 20; 200 LIQUID ORAL at 14:09

## 2020-08-10 RX ADMIN — PANTOPRAZOLE SODIUM 40 MG: 40 TABLET, DELAYED RELEASE ORAL at 18:05

## 2020-08-10 RX ADMIN — MORPHINE SULFATE 4 MG: 4 INJECTION, SOLUTION INTRAMUSCULAR; INTRAVENOUS at 02:55

## 2020-08-10 RX ADMIN — BUSPIRONE HYDROCHLORIDE 5 MG: 5 TABLET ORAL at 20:33

## 2020-08-10 RX ADMIN — ONDANSETRON 4 MG: 2 INJECTION INTRAMUSCULAR; INTRAVENOUS at 04:06

## 2020-08-10 RX ADMIN — SODIUM CHLORIDE: 9 INJECTION, SOLUTION INTRAVENOUS at 04:06

## 2020-08-10 RX ADMIN — PROMETHAZINE HYDROCHLORIDE 12.5 MG: 25 TABLET ORAL at 13:47

## 2020-08-10 RX ADMIN — Medication 10 ML: at 04:07

## 2020-08-10 RX ADMIN — DEXTROMETHORPHAN HYDROBROMIDE AND GUAIFENESIN 10 ML: 20; 200 LIQUID ORAL at 18:10

## 2020-08-10 RX ADMIN — SODIUM CHLORIDE, PRESERVATIVE FREE 10 ML: 5 INJECTION INTRAVENOUS at 20:34

## 2020-08-10 RX ADMIN — FAMOTIDINE 20 MG: 20 TABLET, FILM COATED ORAL at 09:02

## 2020-08-10 RX ADMIN — Medication 1 CAPSULE: at 09:02

## 2020-08-10 RX ADMIN — OXYCODONE HYDROCHLORIDE 10 MG: 5 TABLET ORAL at 13:43

## 2020-08-10 RX ADMIN — BUSPIRONE HYDROCHLORIDE 5 MG: 5 TABLET ORAL at 09:02

## 2020-08-10 RX ADMIN — TRAMADOL HYDROCHLORIDE 50 MG: 50 TABLET, FILM COATED ORAL at 20:34

## 2020-08-10 RX ADMIN — ENOXAPARIN SODIUM 30 MG: 30 INJECTION SUBCUTANEOUS at 20:34

## 2020-08-10 RX ADMIN — SODIUM CHLORIDE: 9 INJECTION, SOLUTION INTRAVENOUS at 18:07

## 2020-08-10 ASSESSMENT — ENCOUNTER SYMPTOMS
DIARRHEA: 0
VOMITING: 0
SINUS PRESSURE: 0
STRIDOR: 0
WHEEZING: 0
COLOR CHANGE: 0
ABDOMINAL PAIN: 0
COUGH: 1
ABDOMINAL DISTENTION: 0
CHEST TIGHTNESS: 1
RHINORRHEA: 0
SHORTNESS OF BREATH: 1
TROUBLE SWALLOWING: 0
NAUSEA: 1
PHOTOPHOBIA: 0

## 2020-08-10 ASSESSMENT — PAIN SCALES - GENERAL
PAINLEVEL_OUTOF10: 6
PAINLEVEL_OUTOF10: 8
PAINLEVEL_OUTOF10: 7
PAINLEVEL_OUTOF10: 8
PAINLEVEL_OUTOF10: 7
PAINLEVEL_OUTOF10: 7

## 2020-08-10 ASSESSMENT — PAIN DESCRIPTION - PROGRESSION
CLINICAL_PROGRESSION: NOT CHANGED
CLINICAL_PROGRESSION: GRADUALLY IMPROVING
CLINICAL_PROGRESSION: NOT CHANGED

## 2020-08-10 ASSESSMENT — PAIN DESCRIPTION - PAIN TYPE
TYPE: ACUTE PAIN

## 2020-08-10 ASSESSMENT — PAIN DESCRIPTION - ONSET
ONSET: ON-GOING

## 2020-08-10 ASSESSMENT — PAIN DESCRIPTION - ORIENTATION
ORIENTATION: LEFT;MID
ORIENTATION: MID;LEFT

## 2020-08-10 ASSESSMENT — PAIN DESCRIPTION - LOCATION
LOCATION: CHEST
LOCATION: CHEST;ABDOMEN
LOCATION: CHEST
LOCATION: CHEST

## 2020-08-10 ASSESSMENT — PAIN - FUNCTIONAL ASSESSMENT
PAIN_FUNCTIONAL_ASSESSMENT: ACTIVITIES ARE NOT PREVENTED

## 2020-08-10 ASSESSMENT — PAIN DESCRIPTION - DESCRIPTORS
DESCRIPTORS: CONSTANT;PRESSURE;TIGHTNESS
DESCRIPTORS: PRESSURE;TIGHTNESS

## 2020-08-10 ASSESSMENT — PAIN DESCRIPTION - FREQUENCY
FREQUENCY: CONTINUOUS

## 2020-08-10 NOTE — CARE COORDINATION
Case Management Initial Discharge Plan  Ijeoma Hanna to mother via phone to discuss discharge plans. Information verified: address, contacts, phone number, , insurance Yes    Emergency Contact/Next of Kin name & number: Mother Fabian Chavez  [phone]    PCP: Farida Swain PA-C  Date of last visit: unknown    Insurance Provider: unsure [employer]    Discharge Planning    Living Arrangements:    boyfriend  Support Systems:   family and friends    Home has 2 stories  0 stairs to climb to get into front door, flightstairs to climb to reach second floor  Location of bedroom/bathroom in home upper    Patient able to perform ADL's:Independent    Current Services (outpatient & in home) none  DME equipment: none   DME provider: n/a    Receiving oral anticoagulation therapy? No    If indicated:   Physician managing anticoagulation treatment:   Where does patient obtain lab work for ATC treatment? Potential Assistance Needed:       Patient agreeable to home care: unsure at this time    Freedom of choice provided:  n/a    Prior SNF/Rehab Placement and Facility:   Agreeable to SNF/Rehab: No  [place] of choice provided: no     Evaluation: no    Expected Discharge date:       Patient expects to be discharged to: Follow Up Appointment: Best Day/ Time:      Transportation provider: self/family  Transportation arrangements needed for discharge: No    Readmission Risk              Risk of Unplanned Readmission:        12             Does patient have a readmission risk score greater than 14?: No  If yes, follow-up appointment must be made within 7 days of discharge.      Goals of Care: feel better    Discharge Plan: home may need [facility].          Electronically signed by Edouard RN on 8/10/20 at 3:21 PM EDT

## 2020-08-10 NOTE — FLOWSHEET NOTE
Assessment:  Patient is a 26 y.o. female in room 3016. Patient contacted via phone. Patient sounded weak. Intervention:  offered words of encouragement.  was ministry of presence. Outcome: Patient expressed gratitude to     Plan:  Chaplains will remain available for spiritual and emotional support as needed. 08/10/20 1344   Encounter Summary   Services provided to: Patient   Referral/Consult From: Multi-disciplinary team   Support System Family members   Continue Visiting   (8/10/2020)   Complexity of Encounter Low   Length of Encounter 15 minutes   Spiritual Assessment Completed Yes   Routine   Type Initial   Assessment Grieving; Anxious   Intervention Active listening;Sustaining presence/ Ministry of presence   Outcome Expressed gratitude

## 2020-08-10 NOTE — CONSULTS
to [facility] she was noted to have a low grade fever of 99.3, no hypoxia. She stated that her dyspnea was significant with exertion and that she experienced chest pain with deep inspiration. pt was given 1L IVF and continued to complain of dizziness    Patient was transferred to [facility] and admitted because of concerns with COVID 19.    CURRENT EVALUATION : 8/10/2020    Pt reports continued chest pain with cough and deep breath  On exam her chest wall is very tender to palpation  Lungs clear  HRR  She is in no distress    Afebrile  VS stable    Patient exhibiting respiratory distress. No  Respiratory secretions:     Patient receiving supplemental oxygen. No  02 sat 99  RR 14      % FIO2:   PEEP:      QTc: 427    (Definition of High Risk Comorbid Conditions: Asthma, COPD, Heart Failure of any cause, DM, Hematologic malignancy, Immunocompromised taking >20 mg/day Prednisone). Baseline Number of High Risk Comorbid conditions:    0  Respiratory Support Level Score: (Room Air= 1 ;Supplemental 02 1L to 15 L/min= 2; Intubation and mechanical Ventilation = 3; Failure to support Ventilatory needs resulting in death =4)   0        NEWS Score: 0-4 Low risk group; 5-6: Medium risk group; 7 or above: High risk group  Parameters 3 2 1 0 1 2 3   Age    < 65   ? 65   RR ? 8  9-11 12-20  21-24 ? 25   O2 Sats ?  91 92-93 94-95 ? 96      Suppl O2  Yes  No      SBP ? 90  101-110 111-219   ? 220   HR ? 40  41-50 51-90  111-130 ? 131   Consciousness    Alert   Drowsiness, lethargy, or confusion   Temperature ? 35.0 C (95.0 F)  35.1-36.0 C 95.1-96.9 F 36.1-38.0 C 97.0-100.4 F 38.1-39.0 C 100.5-102.3 F ? 39.1 C ? 102.4 F      NEWS Score:   8-10-20: 0 low risk    Overall Daily Picture:    Unchanged      Presence of secondary bacterial Infection:  No   Additional antibiotics:     Labs, X rays reviewed: 8/10/2020    BUN: 6  Cr: 0.55    WBC: 8.0  Hb: 12.2  Plat: 325    Absolute Neutrophils: 4.49  Absolute Lymphocytes: 2.78  Neutrophil/Lymphocyte Ratio: 1.6 low risk    CRP: <0.3 (8-1-20)  Ferritin: 28  LDH:  124->112->90  Fibrinogen 255    D dimer <0.17    Pro Calcitonin:    COVID   7-22: positive    Cultures:  Urine:  ·   Blood:  ·   Sputum :  ·   Wound:       CXR:     CAT:  8-9   1. No evidence of pulmonary embolus    2. Subsegmental atelectasis, left lung base    3. Hyperenhancing liver lesions, largest measuring 3.5 x 2.8 cm. Differential considerations would include focal nodular hyperplasia, adenoma,    or atypical hemangiomas.  Lesions could be further characterized with    elective MR imaging, as recommended previously      8-1:   *No evidence of pulmonary embolism or acute pulmonary abnormality. *2 enhancing lesions are identified within the right lobe of the liver,    largest measuring 3.4 cm.  Complete evaluation with CT or MRI utilizing liver    mass protocol is recommended. Discussed with patient, RN, CC, IM.  8-9    I have personally reviewed the past medical history, past surgical history, medications, social history, and family history, and I have updated the database accordingly.   Past Medical History:     Past Medical History:   Diagnosis Date    Dysmenorrhea     Menorrhagia        Past Surgical  History:     Past Surgical History:   Procedure Laterality Date    ENDOMETRIAL BIOPSY  5/5/16    INTRAUTERINE DEVICE INSERTION  5/5/16     Nimo    INTRAUTERINE DEVICE REMOVAL  03/14/2017       Medications:      enoxaparin  30 mg Subcutaneous BID    famotidine  20 mg Oral BID    sodium chloride flush  10 mL Intravenous 2 times per day    busPIRone  5 mg Oral BID    norethindrone-ethinyl estradiol  1 tablet Oral Daily    lactobacillus  1 capsule Oral Daily       Social History:     Social History     Socioeconomic History    Marital status: Single     Spouse name: Not on file    Number of children: Not on file    Years of education: Not on file    Highest education level: Not on file Occupational History    Not on file   Social Needs    Financial resource strain: Not on file    Food insecurity     Worry: Not on file     Inability: Not on file    Transportation needs     Medical: Not on file     Non-medical: Not on file   Tobacco Use    Smoking status: Never Smoker    Smokeless tobacco: Never Used   Substance and Sexual Activity    Alcohol use: Yes     Alcohol/week: 0.0 standard drinks     Comment: once a month    Drug use: No    Sexual activity: Yes     Partners: Male     Birth control/protection: Pill   Lifestyle    Physical activity     Days per week: Not on file     Minutes per session: Not on file    Stress: Not on file   Relationships    Social connections     Talks on phone: Not on file     Gets together: Not on file     Attends Quaker service: Not on file     Active member of club or organization: Not on file     Attends meetings of clubs or organizations: Not on file     Relationship status: Not on file    Intimate partner violence     Fear of current or ex partner: Not on file     Emotionally abused: Not on file     Physically abused: Not on file     Forced sexual activity: Not on file   Other Topics Concern    Not on file   Social History Narrative    Not on file       Family History:     Family History   Problem Relation Age of Onset    High Blood Pressure Paternal Grandmother     Heart Disease Paternal Grandmother     Alcohol Abuse Mother     Substance Abuse Mother     Hypertension Father     Diabetes Father         borderline    Cancer Paternal Aunt 61        Brain Ca    Arthritis Maternal Grandmother     Osteoporosis Maternal Grandmother     Osteoarthritis Maternal Grandmother     Heart Disease Paternal Grandfather     High Blood Pressure Paternal Grandfather         Allergies:   Amoxil [amoxicillin]     Review of Systems:       Constitutional: No fevers or chills.  Chest pain with cough and deep breathing  Head: No headaches  Eyes: No double vision or blurry vision. No conjunctival inflammation. ENT: No sore throat or runny nose. . No hearing loss, tinnitus or vertigo. Cardiovascular: chest pain with deep breath or cough, no palpitations. No shortness of breath. Tachycardia with exertion  Lung: No shortness of breath, dry cough. No sputum production  Abdomen: No nausea, vomiting, diarrhea, or abdominal pain. Ariel Pee No cramps. Genitourinary: No increased urinary frequency, or dysuria. No hematuria. No suprapubic or CVA pain  Musculoskeletal: No muscle aches or pains. No joint effusions, swelling or deformities  Hematologic: No bleeding or bruising. Neurologic: No headache, weakness, numbness, or tingling. Integument: No rash, no ulcers. Psychiatric: No depression. Endocrine: No polyuria, no polydipsia, no polyphagia. Physical Examination :     Patient Vitals for the past 8 hrs:   BP Temp Temp src Pulse Resp SpO2 Height Weight   08/10/20 0447 125/79 98.4 °F (36.9 °C) Oral 89 18 99 % -- --   08/10/20 0446 -- -- -- 74 -- -- -- --   08/10/20 0419 -- -- -- -- -- -- 5' 1\" (1.549 m) 134 lb 11.2 oz (61.1 kg)   08/10/20 0343 128/84 98.6 °F (37 °C) Oral -- -- -- -- --     General Appearance: Awake, alert, and in no apparent distress. Dry cough noted  Head:  Normocephalic, no trauma  Eyes: Pupils equal, round, reactive to light and accommodation; extraocular movements intact; sclera anicteric; conjunctivae pink. No embolic phenomena. ENT: Oropharynx clear, without erythema, exudate, or thrush. No tenderness of sinuses. Mouth/throat: mucosa pink and moist. No lesions. Dentition in good repair. Neck:Supple, without lymphadenopathy. Thyroid normal, No bruits. Pulmonary/Chest: Clear to auscultation, without wheezes, rales, or rhonchi. No dullness to percussion. Cardiovascular: Regular rate and rhythm without murmurs, rubs, or gallops. Abdomen: Soft, non tender.  Bowel sounds normal. No organomegaly  All four Extremities: No cyanosis, clubbing, edema, or effusions. Neurologic: No gross sensory or motor deficits. Skin: Warm and dry with good turgor. No signs of peripheral arterial or venous insufficiency. No ulcerations. No open wounds. Medical Decision Making -Laboratory:   I have independently reviewed/ordered the following labs:    CBC with Differential:   Recent Labs     08/09/20  2145 08/10/20  0701   WBC 8.5 8.0   HGB 13.6 12.2   HCT 39.8 37.4    325   LYMPHOPCT 35 35   MONOPCT 8* 7     BMP:   Recent Labs     08/09/20  2145 08/10/20  0701    139   K 3.7 3.9    105   CO2 23 23   BUN 10 6   CREATININE 0.67 0.55     Hepatic Function Panel:   Recent Labs     08/09/20  2145 08/10/20  0701   PROT 7.2 5.9*   LABALBU 4.3 3.7   BILITOT 0.17* 0.23*   ALKPHOS 59 50   ALT 23 22   AST 18 16     No results for input(s): RPR in the last 72 hours. No results for input(s): HIV in the last 72 hours. No results for input(s): BC in the last 72 hours. Lab Results   Component Value Date    RBC 4.22 08/10/2020    WBC 8.0 08/10/2020     Lab Results   Component Value Date    CREATININE 0.55 08/10/2020    GLUCOSE 87 08/10/2020       Medical Decision Making-Imaging:     EXAMINATION:    CTA OF THE CHEST 8/9/2020 10:44 pm         TECHNIQUE:    CTA of the chest was performed after the administration of intravenous    contrast.  Multiplanar reformatted images are provided for review.  MIP    images are provided for review.  Dose modulation, iterative reconstruction,    and/or weight based adjustment of the mA/kV was utilized to reduce the    radiation dose to as low as reasonably achievable.         COMPARISON:    August 1, 2020         HISTORY:    ORDERING SYSTEM PROVIDED HISTORY: COVID positive worsening chest pain, on    birth control - HIGH RISK FOR PE    TECHNOLOGIST PROVIDED HISTORY:    COVID positive worsening chest pain, on birth control - HIGH RISK FOR PE    Reason for Exam: COVID positive worsening chest pain, on birth control    Acuity: Acute    Type of

## 2020-08-10 NOTE — H&P
[facility]    HISTORY AND PHYSICAL EXAMINATION            Date:   8/10/2020  Patient name:  Jada Torre  Date of admission:  8/10/2020  3:42 AM  MRN:   [id number]  Account:  [de-identified]  YOB: 1995  PCP:    Nik Lux PA-C  Room:   [facility]301-  Code Status:    Full Code    Chief Complaint:     Transfer OSH: chest pain and shortness of breath    History Obtained From:     patient    History of Present Illness:     Jada Torre is a 26 y.o. / female who presents with No chief complaint on file. and is admitted to the hospital for the management of COVID-19. This is a 26 yr old female transferred from [address] [place] [address] with worsening chest pressure, dizziness, and dyspnea. Patient tested positive for COVID-19 on July 22, and reports continued worsening of symptoms since diagnosis. No hypoxia noted, low grade fevers reported at 99.3, patient's largest complaint is chest pressure/discomfort associated with coughing and deep inspiration. Dyspnea markedly worse with activity and is alleviated with rest.  Patient reports attempting otc cough suppressants and rescue inhaler at home without relief. Also reports a constant state of nausea, poor appetite, and lack of taste. Cough is dry and non-productive. CT chest at [facility] negative for PE and Troponin negative. On my evaluation, patient is sitting up in bed, and ill appearing. VSS on monitor, no hypoxia, oxygen saturations 99% on RA. Deep inspiration limited secondary to discomfort and cough. Endorses mid chest pressure that worsens with cough, denies any radiation or associated diaphoresis, ha/dizziness, or diaphoresis at present.     Past Medical History:     Past Medical History:   Diagnosis Date    Dysmenorrhea     Menorrhagia         Past Surgical History:     Past Surgical History:   Procedure Laterality Date    ENDOMETRIAL BIOPSY  5/5/16    INTRAUTERINE DEVICE INSERTION  5/5/16     Nimo    INTRAUTERINE DEVICE REMOVAL  03/14/2017        Medications Prior to Admission:     Prior to Admission medications    Medication Sig Start Date End Date Taking? Authorizing Provider   azithromycin (ZITHROMAX) 250 MG tablet Take 1 tablet by mouth See Admin Instructions for 5 days 500mg on day 1 followed by 250mg on days 2 - 5 8/5/20 8/10/20 Yes Sabra Barthel, PA-C   guaiFENesin (ALTARUSSIN) 100 MG/5ML syrup Take 10 mLs by mouth 3 times daily as needed for Cough or Congestion 8/5/20 8/12/20 Yes Sabra Barthel, PA-C   busPIRone (BUSPAR) 5 MG tablet Take 1 tablet by mouth 2 times daily 8/5/20  Yes Sabra Barthel, PA-C   albuterol sulfate HFA (PROVENTIL HFA) 108 (90 Base) MCG/ACT inhaler Inhale 2 puffs into the lungs every 6 hours as needed for Wheezing or Shortness of Breath 7/28/20  Yes Sabra Barthel, PA-C   norethindrone-ethinyl estradiol (Viviana Gavirialer 1/35, 28,) 1-35 MG-MCG per tablet Take 1 tablet by mouth daily 7/27/20  Yes TERRENCE Michael CNP   ondansetron (ZOFRAN) 4 MG tablet Take 1 tablet by mouth every 8 hours as needed for Nausea or Vomiting 7/27/20  Yes TERRENCE Gonzalez CNP   Lactobacillus (PROBIOTIC ACIDOPHILUS PO) Take by mouth   Yes Historical Provider, MD   fluticasone (FLONASE) 50 MCG/ACT nasal spray 1 spray by Each Nostril route daily   Yes Historical Provider, MD   Fexofenadine HCl (ALLEGRA ALLERGY PO) Take by mouth   Yes Historical Provider, MD        Allergies:     Amoxil [amoxicillin]    Social History:     Tobacco:    reports that she has never smoked. She has never used smokeless tobacco.  Alcohol:      reports current alcohol use. Drug Use:  reports no history of drug use.     Family History:     Family History   Problem Relation Age of Onset    High Blood Pressure Paternal Grandmother     Heart Disease Paternal Grandmother     Alcohol Abuse Mother     Substance Abuse Mother     Hypertension Father     Diabetes Father borderline    Cancer Paternal Aunt 61        Brain Ca    Arthritis Maternal Grandmother     Osteoporosis Maternal Grandmother     Osteoarthritis Maternal Grandmother     Heart Disease Paternal Grandfather     High Blood Pressure Paternal Grandfather        Review of Systems:     Positive and Negative as described in HPI. Review of Systems   Constitutional: Positive for activity change, appetite change, chills and fever. HENT: Negative for postnasal drip, rhinorrhea, sinus pressure and trouble swallowing. Eyes: Negative for photophobia and visual disturbance. Respiratory: Positive for cough, chest tightness and shortness of breath. Negative for wheezing and stridor. Cardiovascular: Positive for chest pain. Negative for palpitations and leg swelling. Gastrointestinal: Positive for nausea. Negative for abdominal distention, abdominal pain, diarrhea and vomiting. Endocrine: Negative for polyphagia and polyuria. Genitourinary: Negative for decreased urine volume, difficulty urinating and hematuria. Musculoskeletal: Negative for arthralgias, myalgias, neck pain and neck stiffness. Skin: Negative for color change, pallor, rash and wound. Neurological: Positive for dizziness. Negative for weakness, light-headedness and headaches. Psychiatric/Behavioral: Negative for agitation, behavioral problems and confusion. The patient is not nervous/anxious. Physical Exam:   /79   Pulse 89   Temp 98.4 °F (36.9 °C) (Oral)   Resp 18   Ht 5' 1\" (1.549 m)   Wt 134 lb 11.2 oz (61.1 kg)   LMP 2020   SpO2 99%   Breastfeeding No   BMI 25.45 kg/m²   Temp (24hrs), Av.4 °F (36.9 °C), Min:98.1 °F (36.7 °C), Max:98.6 °F (37 °C)    No results for input(s): POCGLU in the last 72 hours.     Intake/Output Summary (Last 24 hours) at 8/10/2020 0512  Last data filed at 8/10/2020 0445  Gross per 24 hour   Intake 226 ml   Output --   Net 226 ml       Physical Exam  Vitals signs and nursing note reviewed. Constitutional:       General: She is not in acute distress. Appearance: She is ill-appearing. She is not toxic-appearing or diaphoretic. HENT:      Head: Normocephalic and atraumatic. Right Ear: External ear normal.      Left Ear: External ear normal.      Nose: Nose normal. No congestion or rhinorrhea. Mouth/Throat:      Mouth: Mucous membranes are dry. Pharynx: Oropharynx is clear. Eyes:      Extraocular Movements: Extraocular movements intact. Conjunctiva/sclera: Conjunctivae normal.      Pupils: Pupils are equal, round, and reactive to light. Neck:      Musculoskeletal: Normal range of motion and neck supple. Vascular: No carotid bruit. Cardiovascular:      Rate and Rhythm: Normal rate and regular rhythm. Pulses: Normal pulses. Heart sounds: Normal heart sounds. No murmur. No friction rub. No gallop. Pulmonary:      Effort: Pulmonary effort is normal. No respiratory distress. Breath sounds: Normal breath sounds. No wheezing, rhonchi or rales. Abdominal:      General: Bowel sounds are normal. There is no distension. Palpations: Abdomen is soft. There is no mass. Tenderness: There is no abdominal tenderness. There is no guarding. Musculoskeletal: Normal range of motion. General: No swelling or tenderness. Right lower leg: No edema. Left lower leg: No edema. Lymphadenopathy:      Cervical: No cervical adenopathy. Skin:     General: Skin is warm and dry. Capillary Refill: Capillary refill takes less than 2 seconds. Coloration: Skin is not jaundiced or pale. Findings: No bruising, erythema, lesion or rash. Neurological:      General: No focal deficit present. Mental Status: She is alert and oriented to person, place, and time. Mental status is at baseline. Cranial Nerves: No cranial nerve deficit. Sensory: No sensory deficit. Motor: No weakness.       Gait: Gait normal.  >60 >60 mL/min    GFR Comment          GFR Staging NOT REPORTED    Protime-INR    Collection Time: 08/09/20  9:45 PM   Result Value Ref Range    Protime 13.1 11.8 - 14.6 sec    INR 1.0    APTT    Collection Time: 08/09/20  9:45 PM   Result Value Ref Range    PTT 32.4 24.0 - 36.0 sec   Fibrinogen    Collection Time: 08/09/20  9:45 PM   Result Value Ref Range    Fibrinogen 292 210 - 530 mg/dL   Lactate Dehydrogenase    Collection Time: 08/09/20  9:45 PM   Result Value Ref Range     (L) 135 - 214 U/L   D-Dimer, Quantitative    Collection Time: 08/09/20  9:45 PM   Result Value Ref Range    D-Dimer, Quant <0.27 0.00 - 0.59 mg/L FEU   HCG Qualitative, Serum    Collection Time: 08/09/20  9:45 PM   Result Value Ref Range    hCG Qual NEGATIVE NEGATIVE   Troponin    Collection Time: 08/09/20  9:45 PM   Result Value Ref Range    Troponin, High Sensitivity <6 0 - 14 ng/L    Troponin T NOT REPORTED <0.03 ng/mL    Troponin Interp NOT REPORTED    Troponin    Collection Time: 08/09/20 11:40 PM   Result Value Ref Range    Troponin, High Sensitivity <6 0 - 14 ng/L    Troponin T NOT REPORTED <0.03 ng/mL    Troponin Interp NOT REPORTED        Imaging/Diagnostics:  Ct Chest Pulmonary Embolism W Contrast    Result Date: 8/9/2020  1. No evidence of pulmonary embolus 2. Subsegmental atelectasis, left lung base 3. Hyperenhancing liver lesions, largest measuring 3.5 x 2.8 cm. Differential considerations would include focal nodular hyperplasia, adenoma, or atypical hemangiomas.   Lesions could be further characterized with elective MR imaging, as recommended previously       Assessment :      Hospital Problems           Last Modified POA    * (Principal) COVID-19 8/10/2020 Yes    Anxiety 8/10/2020 Yes          Plan:     Patient status inpatient in the Progressive Unit/Step down    1. COVID-19: ID consulted, appreciate recommendations and management, obtain baseline EKG in setting of chest pain, continue with supportive

## 2020-08-10 NOTE — ED PROVIDER NOTES
[facility] ED  Emergency Department Encounter  Emergency Medicine Attending Note     Pt Name: Elmira Michele  MRN: [id number]  Armsmagdygfgaby 1995   Date of evaluation: 8/9/2020  PCP:  Alfonzo Bueno PA-C    [address] [place] [address]       Chief Complaint   Patient presents with    Chest Pain    Shortness of Breath    Other     COVID Postive 7/22/2020       HISTORY OF PRESENT ILLNESS  (Location/Symptom, Timing/Onset, Context/Setting, Quality, Duration, Modifying Factors, Severity.)      Elmira Michele is a 26 y.o. female who presents with known CoVID 19 is progressively been worsening periodically with severe chest pain, cough, and dizziness. Patient was diagnosed with CoVID on 7/22. States that she never felt improved, but over the last 2 days she is now started having a crushing chest pain that is rated a 10/10, and states that she can barely walk without getting extremely dizzy ambulating in a pass out. Still feeling short of breath, and having a severe cough. No history of cardiac disease. Patient is on an oral birth control, but has had no history of any prior pulmonary emboli in the past and has had no leg swelling or leg pain. PAST MEDICAL / SURGICAL / SOCIAL / FAMILY HISTORY     Past Medical History:  has a past medical history of Dysmenorrhea and Menorrhagia. Past Surgical History:  has a past surgical history that includes intrauterine device insertion (5/5/16 ); Endometrial biopsy (5/5/16); and Intrauterine Device Removal (03/14/2017). Allergies:  Amoxil [amoxicillin]     Home Meds:   Prior to Visit Medications    Medication Sig Taking?  Authorizing Provider   guaiFENesin (ALTARUSSIN) 100 MG/5ML syrup Take 10 mLs by mouth 3 times daily as needed for Cough or Congestion Yes Alfonzo Bueno PA-C   busPIRone (BUSPAR) 5 MG tablet Take 1 tablet by mouth 2 times daily Yes Alfonzo Bueno PA-C   albuterol sulfate HFA (PROVENTIL HFA) 108 (90 Base) MCG/ACT inhaler Inhale 2 puffs into the lungs every 6 hours as needed for Wheezing or Shortness of Breath Yes Clifton Gilbert PA-C   norethindrone-ethinyl estradiol (Angela Beckwith 1/35, 28,) 1-35 MG-MCG per tablet Take 1 tablet by mouth daily Yes TERRENCE Bergman CNP   ondansetron (ZOFRAN) 4 MG tablet Take 1 tablet by mouth every 8 hours as needed for Nausea or Vomiting Yes TERRENCE Swift CNP   Lactobacillus (PROBIOTIC ACIDOPHILUS PO) Take by mouth Yes Historical Provider, MD   fluticasone (FLONASE) 50 MCG/ACT nasal spray 1 spray by Each Nostril route daily Yes Historical Provider, MD   Fexofenadine HCl (ALLEGRA ALLERGY PO) Take by mouth Yes Historical Provider, MD   azithromycin (ZITHROMAX) 250 MG tablet Take 1 tablet by mouth See Admin Instructions for 5 days 500mg on day 1 followed by 250mg on days 2 - 240 Lara Wells PA-C     Please note that medications prescribed at discharge will auto-populate into this medication list when note is refreshed. Please look at prescription date andprescriber to clarify. Family History:family history includes Alcohol Abuse in her mother; Arthritis in her maternal grandmother; Cancer (age of onset: 61) in her paternal aunt; Diabetes in her father; Heart Disease in her paternal grandfather and paternal grandmother; High Blood Pressure in her paternal grandfather and paternal grandmother; Hypertension in her father; Osteoarthritis in her maternal grandmother; Osteoporosis in her maternal grandmother; Substance Abuse in her mother. Social History: She reports that she has never smoked. She has never used smokeless tobacco. She reports current alcohol use. She reports that she does not use drugs. She reports being sexually active and has had partner(s) who are Male. She reports using the following method of birth control/protection: Pill. REVIEW OF SYSTEMS    (2-9 systems for level 4, 10 or more for level 5)      Review of Systems   Constitutional: Positive for chills and fatigue.  Negative for fever.   HENT: Positive for congestion and sore throat. Eyes: Negative for pain and visual disturbance. Respiratory: Positive for cough, chest tightness and shortness of breath. Cardiovascular: Positive for chest pain and palpitations. Gastrointestinal: Negative for abdominal pain, constipation, diarrhea, nausea and vomiting. Genitourinary: Negative for dysuria and frequency. Musculoskeletal: Positive for myalgias. Negative for arthralgias, back pain and joint swelling. Skin: Negative for rash and wound. Neurological: Positive for dizziness and light-headedness. Negative for weakness and headaches. PHYSICAL EXAM   (up to 7 for level 4, 8 or more for level 5)      Initial Vitals   ED Triage Vitals [08/09/20 2136]   BP Temp Temp Source Pulse Resp SpO2 Height Weight   137/73 98.1 °F (36.7 °C) Oral 108 22 99 % 5' 1\" (1.549 m) 130 lb (59 kg)       Physical Exam  Vitals signs and nursing note reviewed. Constitutional:       Comments: Alert and sitting in the bed without distress. However, when attempting to get patient to sit up, she starts coughing and becomes more tachypneic. When turning the patient to stand up on the side of the bed, she started to ambulate made it less than 5 seconds before she became extremely tachycardic, tachypneic, off balance and had to be lowered back down to the bed by both me and the nursing staff. Has significant distress with minimal exertion. HENT:      Head: Normocephalic and atraumatic. Nose: Nose normal.      Mouth/Throat:      Mouth: Mucous membranes are moist.   Eyes:      Extraocular Movements: Extraocular movements intact. Conjunctiva/sclera: Conjunctivae normal.   Neck:      Musculoskeletal: Normal range of motion. No neck rigidity or muscular tenderness. Cardiovascular:      Heart sounds: Normal heart sounds. No murmur. No friction rub.       Comments: Normal rate with sitting () and then > 130 with standing or any movement. Pulmonary:      Comments: Normal RR and lungs clear to auscultation with dry cough with sitting. But with any movement or exertion she becomes tachypneic in the 30-40s. Maintained sats but did not tolerate more than 5seconds of exertion. Abdominal:      General: There is no distension. Palpations: Abdomen is soft. Tenderness: There is no abdominal tenderness. Musculoskeletal: Normal range of motion. General: No swelling. Right lower leg: No edema. Left lower leg: No edema. Skin:     General: Skin is warm. Capillary Refill: Capillary refill takes less than 2 seconds. Findings: No bruising or erythema. Neurological:      General: No focal deficit present. Mental Status: She is oriented to person, place, and time. Motor: No weakness. Comments: No abnormal coordination with sitting, but loses balance with standing. DIFFERENTIAL DIAGNOSIS/IMPRESSION     DDX: CoVID infection, possible Inflammatory storm. Myocarditis, pericarditis, CAD, pulmonary emboli    Impression: 26 y.o. female who presents with known CoVID-19. Patient is having severe dizzy spells and tachycardia. Sitting down, vital signs are stable Patient tried to stand up on side of the bed and takes more than 2 steps in place, heart rate goes above 135 respiratory rate goes up to 36, however oxygen to maintain 100% but we barely got patient standing aware of the sit her down. I have concern that she might have PE given she is on birth control and no has known CoVID-19 positive we will also check for other troponin signs.   Patient does not appear to be clinically dehydrated, has moist mucous membranes and no skin tenting, therefore will hold on IV fluids at this time until labs are back to ensure that there is no signs of new cardiomyopathy as there is an increased risk of vascular edema ankle the patient's likely plan for admission given the patient is unstable with any attempt to move. DIAGNOSTIC RESULTS     EKG: All EKG's are interpreted by the Emergency Department Physician who either signs or Co-signs this chart in the absence of a cardiologist.    EKG Interpretation    Interpreted by emergency department physician    Rhythm: normal sinus   Rate: normal  Axis: normal  Ectopy: none  Conduction: normal  ST Segments: no acute change  T Waves: no acute change  Q Waves: none    Clinical Impression: non-specific EKG unchanged from prior EKG on 8/1/20. Genita Blush      LABS: Gretta Gottron reviewed by me and abnormal results are displayed above     Labs Reviewed   CBC WITH AUTO DIFFERENTIAL - Abnormal; Notable for the following components:       Result Value    Monocytes 8 (*)     All other components within normal limits   COMPREHENSIVE METABOLIC PANEL W/ REFLEX TO MG FOR LOW K - Abnormal; Notable for the following components:    Glucose 122 (*)     Total Bilirubin 0.17 (*)     All other components within normal limits   LACTATE DEHYDROGENASE - Abnormal; Notable for the following components:     (*)     All other components within normal limits   PROTIME-INR   APTT   FIBRINOGEN   D-DIMER, QUANTITATIVE   HCG, SERUM, QUALITATIVE   TROPONIN   TROPONIN   FERRITIN       RADIOLOGY: All plain film, CT, MRI, and formal ultrasound images (except ED bedside ultrasound) are read by the radiologist, see reports below, unless otherwise noted in ED Course, MDM or here. Ct Chest Pulmonary Embolism W Contrast    Result Date: 8/9/2020  EXAMINATION: CTA OF THE CHEST 8/9/2020 10:44 pm TECHNIQUE: CTA of the chest was performed after the administration of intravenous contrast.  Multiplanar reformatted images are provided for review. MIP images are provided for review. Dose modulation, iterative reconstruction, and/or weight based adjustment of the mA/kV was utilized to reduce the radiation dose to as low as reasonably achievable.  COMPARISON: August 1, 2020 HISTORY: ORDERING SYSTEM PROVIDED HISTORY: COVID positive worsening chest pain, on birth control - HIGH RISK FOR PE TECHNOLOGIST PROVIDED HISTORY: COVID positive worsening chest pain, on birth control - HIGH RISK FOR PE Reason for Exam: COVID positive worsening chest pain, on birth control Acuity: Acute Type of Exam: Initial FINDINGS: Pulmonary Arteries: Pulmonary arteries are adequately opacified for evaluation. No evidence of intraluminal filling defect to suggest pulmonary embolism. Main pulmonary artery is normal in caliber. Mediastinum: No evidence of mediastinal lymphadenopathy. The heart and pericardium demonstrate no acute abnormality. There is no acute abnormality of the thoracic aorta. Lungs/pleura: Subsegmental atelectasis is seen within the left lung base. Fibrotic stranding is again seen within the right middle lobe. .  No focal consolidation or pulmonary edema. No evidence of pleural effusion or pneumothorax. Upper Abdomen: 2 hyperenhancing lesions are again seen within the posteromedial aspect of the right lobe of the liver, largest measuring 3.5 x 2.8 cm. Soft Tissues/Bones: No acute bone or soft tissue abnormality. 1. No evidence of pulmonary embolus 2. Subsegmental atelectasis, left lung base 3. Hyperenhancing liver lesions, largest measuring 3.5 x 2.8 cm. Differential considerations would include focal nodular hyperplasia, adenoma, or atypical hemangiomas. Lesions could be further characterized with elective MR imaging, as recommended previously        BEDSIDE ULTRASOUND:  Not clinically indicated at this time.       ED COURSE      ED Medication Orders (From admission, onward)    Start Ordered     Status Ordering Provider    08/10/20 0300 08/10/20 0245  morphine sulfate (PF) injection 4 mg  ONCE      Last MAR action:  Given - by Luciana Saini on 08/10/20 at 0255 Sita MOODY    08/09/20 2330 08/09/20 2326  aspirin chewable tablet 324 mg  ONCE      Last MAR action:  Given - by Luciana Saini on 08/09/20 at 2348 CLARISA PEREZ    08/09/20 2330 08/09/20 2326  0.9 % sodium chloride bolus  ONCE      Last MAR action:  Stopped - by Amy Kent on 08/10/20 at 0054 Monica Ramachandran E    08/09/20 2245 08/09/20 2241  sodium chloride flush 0.9 % injection 10 mL  ONCE      Last MAR action:  Given - by Jannet Flores on 08/09/20 at [id number] CLARISA Bradshaw    08/09/20 2245 08/09/20 2241  0.9 % sodium chloride bolus  ONCE      Last MAR action:  Stopped - by Jannet Peseven on 08/09/20 at [address]    08/09/20 2241 08/09/20 2241  ioversol (OPTIRAY) 74 % injection 75 mL  IMG ONCE PRN      Last MAR action:  Given - by Jannet Sandra on 08/09/20 at [address]    08/09/20 2200 08/09/20 2153  ketorolac (TORADOL) injection 30 mg  ONCE      Last MAR action:  Given - by Amy Kent on 08/09/20 at 2158 CLARISA PEREZ          EMERGENCYDEPARTMENT COURSE:    Patient's EKG unchanged. CT scan shows known liver lesions, no signs of PE or significant infiltrate. Normal troponin. Will give 1 L of IVFs and then re-evaluate. Nursing attempted to get patient to sit up and move to stand at the side of the bed and she became unsteady and almost fell over in the bed and was not able to stand up. Tachycardic and tachypneic again. Given Aspirin and morphine. Transfer to [address]. Spoke to the Nurse practioner who accepted. PROCEDURES:  None     CONSULTS:  None    CRITICAL CARE:  None      FINAL IMPRESSION      1. COVID-19    2. Atypical chest pain    3. Sinus tachycardia        DISPOSITION / PLAN     DISPOSITION Decision To Transfer 08/10/2020 01:10:58 AM      PATIENT REFERRED TO:  No follow-up provider specified.     DISCHARGE MEDICATIONS:  Discharge Medication List as of 8/10/2020  3:19 AM          Jose Luis Trevino MD  Emergency Medicine Attending      (Please note that portions of this note were completed with a voice recognition program.  Efforts were made to edit the dictations but occasionallywords are mis-transcribed.)          Tami Bradford MD  08/11/20 6703

## 2020-08-10 NOTE — PROGRESS NOTES
Occupational Therapy    Occupational Therapy Not Seen Note    DATE: 8/10/2020  Name: Yulissa Kingsley  : 1995  MRN: [id number]    Patient not available for Occupational Therapy due to: Other: RN states pt does need OT eval, tachypneic, however, RN in room placing IV/drawing blood, etc. Wants OT to return tomorrow for eval.    Next Scheduled Treatment: Attempt on  as appropriate.     Electronically signed by Yohana Perry OT on 8/10/2020 at 1:59 PM

## 2020-08-10 NOTE — ED NOTES
Mode of arrival (squad #, walk in, police, etc) : walk in from home        Chief complaint(s): chest pain, shortness of breath, covid positive        Arrival Note (brief scenario, treatment PTA, etc). : Pt presented to the ED c/o chest pain and shortness of breath. Pt states she was tested positive for Covid on 7/22/20. Pt states about a week ago she was seen in the ER for chest pain and shortness of breath. Pt states she was discharged home from that visit. Pt states the pain and shortness of breath has been getting progressively worse. Pt states she is becoming extremely dizzy with movement. Pt states she is having mid chest pain that radiates to her epigastric area, states that it feels like a tightness, burning, and pressure. Pt states he is also having some nausea with a loss of smell and taste. Pt states pain is worse with inspiration. Pt alert and oriented, tachycardic upon arrival.        C= \"Have you ever felt that you should Cut down on your drinking? \"  No  A= \"Have people Annoyed you by criticizing your drinking? \"  No  G= \"Have you ever felt bad or Guilty about your drinking? \"  No  E= \"Have you ever had a drink as an Eye-opener first thing in the morning to steady your nerves or to help a hangover? \"  No      Deferred []      Reason for deferring: N/A    *If yes to two or more: probable alcohol abuse. Mary Ross RN  08/09/20 0143

## 2020-08-10 NOTE — ED NOTES
Pt states she is still feeling the same after the infusion of 1L NS. Pt states her chest still feels like it is burning and is still having dizziness. Pt states she also is having nasal congestion at this time.       April Trejo RN  08/10/20 [id number]

## 2020-08-11 ENCOUNTER — APPOINTMENT (OUTPATIENT)
Dept: GENERAL RADIOLOGY | Age: 25
DRG: 179 | End: 2020-08-11
Attending: INTERNAL MEDICINE
Payer: COMMERCIAL

## 2020-08-11 VITALS
TEMPERATURE: 99 F | HEIGHT: 61 IN | DIASTOLIC BLOOD PRESSURE: 82 MMHG | WEIGHT: 137.35 LBS | BODY MASS INDEX: 25.93 KG/M2 | OXYGEN SATURATION: 100 % | SYSTOLIC BLOOD PRESSURE: 119 MMHG | HEART RATE: 114 BPM | RESPIRATION RATE: 21 BRPM

## 2020-08-11 LAB
ABSOLUTE EOS #: 0.25 K/UL (ref 0–0.44)
ABSOLUTE IMMATURE GRANULOCYTE: <0.03 K/UL (ref 0–0.3)
ABSOLUTE LYMPH #: 2.85 K/UL (ref 1.1–3.7)
ABSOLUTE MONO #: 0.54 K/UL (ref 0.1–1.2)
ALBUMIN SERPL-MCNC: 3.3 G/DL (ref 3.5–5.2)
ALBUMIN/GLOBULIN RATIO: 1.7 (ref 1–2.5)
ALP BLD-CCNC: 43 U/L (ref 35–104)
ALT SERPL-CCNC: 20 U/L (ref 5–33)
ANION GAP SERPL CALCULATED.3IONS-SCNC: 10 MMOL/L (ref 9–17)
ANTI-NUCLEAR ANTIBODY (ANA): NEGATIVE
AST SERPL-CCNC: 14 U/L
BASOPHILS # BLD: 1 % (ref 0–2)
BASOPHILS ABSOLUTE: 0.05 K/UL (ref 0–0.2)
BILIRUB SERPL-MCNC: 0.29 MG/DL (ref 0.3–1.2)
BUN BLDV-MCNC: 7 MG/DL (ref 6–20)
BUN/CREAT BLD: ABNORMAL (ref 9–20)
CALCIUM SERPL-MCNC: 7.8 MG/DL (ref 8.6–10.4)
CHLORIDE BLD-SCNC: 110 MMOL/L (ref 98–107)
CO2: 20 MMOL/L (ref 20–31)
CREAT SERPL-MCNC: 0.66 MG/DL (ref 0.5–0.9)
D-DIMER QUANTITATIVE: <0.17 MG/L FEU
DIFFERENTIAL TYPE: ABNORMAL
EOSINOPHILS RELATIVE PERCENT: 3 % (ref 1–4)
FIBRINOGEN: 238 MG/DL (ref 140–420)
GFR AFRICAN AMERICAN: >60 ML/MIN
GFR NON-AFRICAN AMERICAN: >60 ML/MIN
GFR SERPL CREATININE-BSD FRML MDRD: ABNORMAL ML/MIN/{1.73_M2}
GFR SERPL CREATININE-BSD FRML MDRD: ABNORMAL ML/MIN/{1.73_M2}
GLUCOSE BLD-MCNC: 82 MG/DL (ref 70–99)
HCT VFR BLD CALC: 33.9 % (ref 36.3–47.1)
HEMOGLOBIN: 11.2 G/DL (ref 11.9–15.1)
IMMATURE GRANULOCYTES: 0 %
INR BLD: 1
LYMPHOCYTES # BLD: 38 % (ref 24–43)
MCH RBC QN AUTO: 29.1 PG (ref 25.2–33.5)
MCHC RBC AUTO-ENTMCNC: 33 G/DL (ref 28.4–34.8)
MCV RBC AUTO: 88.1 FL (ref 82.6–102.9)
MONOCYTES # BLD: 7 % (ref 3–12)
NRBC AUTOMATED: 0 PER 100 WBC
PARTIAL THROMBOPLASTIN TIME: 28.5 SEC (ref 20.5–30.5)
PDW BLD-RTO: 13.1 % (ref 11.8–14.4)
PLATELET # BLD: 322 K/UL (ref 138–453)
PLATELET ESTIMATE: ABNORMAL
PMV BLD AUTO: 10.2 FL (ref 8.1–13.5)
POTASSIUM SERPL-SCNC: 3.9 MMOL/L (ref 3.7–5.3)
PROTHROMBIN TIME: 10.8 SEC (ref 9–12)
RBC # BLD: 3.85 M/UL (ref 3.95–5.11)
RBC # BLD: ABNORMAL 10*6/UL
SEG NEUTROPHILS: 51 % (ref 36–65)
SEGMENTED NEUTROPHILS ABSOLUTE COUNT: 3.81 K/UL (ref 1.5–8.1)
SODIUM BLD-SCNC: 140 MMOL/L (ref 135–144)
TOTAL PROTEIN: 5.3 G/DL (ref 6.4–8.3)
WBC # BLD: 7.5 K/UL (ref 3.5–11.3)
WBC # BLD: ABNORMAL 10*3/UL

## 2020-08-11 PROCEDURE — 85384 FIBRINOGEN ACTIVITY: CPT

## 2020-08-11 PROCEDURE — 6360000002 HC RX W HCPCS: Performed by: NURSE PRACTITIONER

## 2020-08-11 PROCEDURE — 71045 X-RAY EXAM CHEST 1 VIEW: CPT

## 2020-08-11 PROCEDURE — 97162 PT EVAL MOD COMPLEX 30 MIN: CPT

## 2020-08-11 PROCEDURE — 80053 COMPREHEN METABOLIC PANEL: CPT

## 2020-08-11 PROCEDURE — 85025 COMPLETE CBC W/AUTO DIFF WBC: CPT

## 2020-08-11 PROCEDURE — 97535 SELF CARE MNGMENT TRAINING: CPT

## 2020-08-11 PROCEDURE — 6370000000 HC RX 637 (ALT 250 FOR IP): Performed by: INTERNAL MEDICINE

## 2020-08-11 PROCEDURE — 99239 HOSP IP/OBS DSCHRG MGMT >30: CPT | Performed by: INTERNAL MEDICINE

## 2020-08-11 PROCEDURE — 85730 THROMBOPLASTIN TIME PARTIAL: CPT

## 2020-08-11 PROCEDURE — 2580000003 HC RX 258: Performed by: NURSE PRACTITIONER

## 2020-08-11 PROCEDURE — 99233 SBSQ HOSP IP/OBS HIGH 50: CPT | Performed by: INTERNAL MEDICINE

## 2020-08-11 PROCEDURE — 85379 FIBRIN DEGRADATION QUANT: CPT

## 2020-08-11 PROCEDURE — 6370000000 HC RX 637 (ALT 250 FOR IP): Performed by: NURSE PRACTITIONER

## 2020-08-11 PROCEDURE — 97530 THERAPEUTIC ACTIVITIES: CPT

## 2020-08-11 PROCEDURE — 85610 PROTHROMBIN TIME: CPT

## 2020-08-11 PROCEDURE — 97166 OT EVAL MOD COMPLEX 45 MIN: CPT

## 2020-08-11 RX ADMIN — SODIUM CHLORIDE, PRESERVATIVE FREE 10 ML: 5 INJECTION INTRAVENOUS at 08:40

## 2020-08-11 RX ADMIN — ONDANSETRON 4 MG: 2 INJECTION INTRAMUSCULAR; INTRAVENOUS at 06:48

## 2020-08-11 RX ADMIN — DEXTROMETHORPHAN HYDROBROMIDE AND GUAIFENESIN 10 ML: 20; 200 LIQUID ORAL at 06:46

## 2020-08-11 RX ADMIN — OXYCODONE HYDROCHLORIDE 10 MG: 5 TABLET ORAL at 12:55

## 2020-08-11 RX ADMIN — PROMETHAZINE HYDROCHLORIDE 12.5 MG: 25 TABLET ORAL at 12:56

## 2020-08-11 RX ADMIN — PANTOPRAZOLE SODIUM 40 MG: 40 TABLET, DELAYED RELEASE ORAL at 06:46

## 2020-08-11 RX ADMIN — OXYCODONE HYDROCHLORIDE 10 MG: 5 TABLET ORAL at 04:04

## 2020-08-11 RX ADMIN — ENOXAPARIN SODIUM 30 MG: 30 INJECTION SUBCUTANEOUS at 08:40

## 2020-08-11 RX ADMIN — BUSPIRONE HYDROCHLORIDE 5 MG: 5 TABLET ORAL at 08:40

## 2020-08-11 RX ADMIN — SODIUM CHLORIDE: 9 INJECTION, SOLUTION INTRAVENOUS at 06:48

## 2020-08-11 RX ADMIN — Medication 1 CAPSULE: at 08:40

## 2020-08-11 RX ADMIN — DOCUSATE SODIUM 100 MG: 100 CAPSULE, LIQUID FILLED ORAL at 09:55

## 2020-08-11 ASSESSMENT — PAIN SCALES - GENERAL
PAINLEVEL_OUTOF10: 5
PAINLEVEL_OUTOF10: 7
PAINLEVEL_OUTOF10: 8

## 2020-08-11 ASSESSMENT — PAIN DESCRIPTION - PROGRESSION: CLINICAL_PROGRESSION: GRADUALLY IMPROVING

## 2020-08-11 ASSESSMENT — ENCOUNTER SYMPTOMS
CHEST TIGHTNESS: 1
NAUSEA: 0
SHORTNESS OF BREATH: 1
EYE PAIN: 0
CONSTIPATION: 0
DIARRHEA: 0
SORE THROAT: 1
BACK PAIN: 0
ABDOMINAL PAIN: 0
VOMITING: 0
COUGH: 1

## 2020-08-11 ASSESSMENT — PAIN DESCRIPTION - FREQUENCY: FREQUENCY: CONTINUOUS

## 2020-08-11 ASSESSMENT — PAIN DESCRIPTION - PAIN TYPE: TYPE: ACUTE PAIN

## 2020-08-11 ASSESSMENT — PAIN DESCRIPTION - DESCRIPTORS: DESCRIPTORS: CONSTANT;PRESSURE;TIGHTNESS

## 2020-08-11 ASSESSMENT — PAIN - FUNCTIONAL ASSESSMENT: PAIN_FUNCTIONAL_ASSESSMENT: ACTIVITIES ARE NOT PREVENTED

## 2020-08-11 ASSESSMENT — PAIN DESCRIPTION - LOCATION: LOCATION: CHEST

## 2020-08-11 ASSESSMENT — PAIN DESCRIPTION - ORIENTATION: ORIENTATION: MID;LEFT

## 2020-08-11 ASSESSMENT — PAIN DESCRIPTION - ONSET: ONSET: ON-GOING

## 2020-08-11 NOTE — DISCHARGE SUMMARY
LABALBU 3.3 08/11/2020    ALBUMIN 1.7 08/11/2020    LABGLOM >60 08/11/2020    GFRAA >60 08/11/2020    GFR      08/11/2020    GFR NOT REPORTED 08/11/2020    PROT 5.3 08/11/2020    CALCIUM 7.8 08/11/2020        Radiology:  Xr Chest Portable    Result Date: 8/11/2020  Negative chest radiograph. Ct Chest Pulmonary Embolism W Contrast    Result Date: 8/9/2020  1. No evidence of pulmonary embolus 2. Subsegmental atelectasis, left lung base 3. Hyperenhancing liver lesions, largest measuring 3.5 x 2.8 cm. Differential considerations would include focal nodular hyperplasia, adenoma, or atypical hemangiomas. Lesions could be further characterized with elective MR imaging, as recommended previously       Consultations:    Consults:     Final Specialist Recommendations/Findings:   IP CONSULT TO INFECTIOUS DISEASES  IP CONSULT TO CARDIOLOGY      The patient was seen and examined on day of discharge and this discharge summary is in conjunction with any daily progress note from day of discharge. Discharge plan:     Disposition: Home    Physician Follow Up:     No follow-up provider specified. Requiring Further Evaluation/Follow Up POST HOSPITALIZATION/Incidental Findings: none    Diet: regular diet    Activity: As tolerated    Instructions to Patient:     Discharge Medications:      Medication List      CONTINUE taking these medications    albuterol sulfate  (90 Base) MCG/ACT inhaler  Commonly known as:  Proventil HFA  Inhale 2 puffs into the lungs every 6 hours as needed for Wheezing or Shortness of Breath     ALLEGRA ALLERGY PO     busPIRone 5 MG tablet  Commonly known as:  BUSPAR  Take 1 tablet by mouth 2 times daily     fluticasone 50 MCG/ACT nasal spray  Commonly known as:  FLONASE     guaiFENesin 100 MG/5ML syrup  Commonly known as:   Altarussin  Take 10 mLs by mouth 3 times daily as needed for Cough or Congestion     Necon 1/35 (28) 1-35 MG-MCG per tablet  Generic drug:  norethindrone-ethinyl estradiol  Take 1 tablet by mouth daily     ondansetron 4 MG tablet  Commonly known as:  ZOFRAN  Take 1 tablet by mouth every 8 hours as needed for Nausea or Vomiting     PROBIOTIC ACIDOPHILUS PO        STOP taking these medications    azithromycin 250 MG tablet  Commonly known as:  ZITHROMAX            No discharge procedures on file. Time Spent on discharge is  32 mins in patient examination, evaluation, counseling as well as medication reconciliation, prescriptions for required medications, discharge plan and follow up. Electronically signed by   Arnie Castle DO  8/11/2020  10:48 AM      Thank you Dr. Lashell Garsia PA-C for the opportunity to be involved in this patient's care.

## 2020-08-11 NOTE — PROGRESS NOTES
[facility]    Progress Note    8/11/2020    10:46 AM    Name:   Dea Meneses  MRN:     [id number]     [place]:      [de-identified]   Room:   [address] [place] [address] Day:  1  Admit Date:  8/10/2020  3:42 AM    PCP:   Sabra Barthel, PA-C  Code Status:  Full Code    Subjective:     C/C: sob  Interval History Status: not changed. Feels ok at rest, gets sob with any activity though-but o2 sats stay at 100% without o2 on    Brief History:     Per my NEERAJ:  Claudia Torres is a 26 y.o. / female who presents with No chief complaint on file. and is admitted to the hospital for the management of COVID-19.     This is a 26 yr old female transferred from [address] [place] [address] with worsening chest pressure, dizziness, and dyspnea. Patient tested positive for COVID-19 on July 22, and reports continued worsening of symptoms since diagnosis. No hypoxia noted, low grade fevers reported at 99.3, patient's largest complaint is chest pressure/discomfort associated with coughing and deep inspiration. Dyspnea markedly worse with activity and is alleviated with rest.  Patient reports attempting otc cough suppressants and rescue inhaler at home without relief. Also reports a constant state of nausea, poor appetite, and lack of taste. Cough is dry and non-productive. CT chest at [facility] negative for PE and Troponin negative. \"    Review of Systems:     Constitutional:  negative for chills, fevers, sweats  Respiratory:  negative for cough, wheezing  Cardiovascular:  negative for chest pain, chest pressure/discomfort, lower extremity edema, palpitations  Gastrointestinal:  negative for abdominal pain, constipation, diarrhea, nausea, vomiting  Neurological:  negative for dizziness, headache    Medications: Allergies:     Allergies   Allergen Reactions    Amoxil [Amoxicillin] Swelling     Reported throat swelling        Current Meds:   Scheduled Meds:    8.0 7.5   RBC 4.59 4.22 3.85*   HGB 13.6 12.2 11.2*   HCT 39.8 37.4 33.9*   MCV 86.7 88.6 88.1   MCH 29.6 28.9 29.1   MCHC 34.1 32.6 33.0   RDW 13.7 13.1 13.1    325 322   MPV 8.7 10.5 10.2   CRP  --  0.5  --    INR 1.0 1.0 1.0   DDIMER <0.27 <0.17 <0.17     Chemistry:  Recent Labs     08/09/20 2145 08/09/20  2340 08/10/20  0701 08/11/20  0358     --  139 140   K 3.7  --  3.9 3.9     --  105 110*   CO2 23  --  23 20   GLUCOSE 122*  --  87 82   BUN 10  --  6 7   CREATININE 0.67  --  0.55 0.66   ANIONGAP 10  --  11 10   LABGLOM >60  --  >60 >60   GFRAA >60  --  >60 >60   CALCIUM 8.8  --  8.0* 7.8*   TROPHS <6 <6  --   --      Recent Labs     08/09/20  2145 08/10/20  0701 08/11/20  0358   PROT 7.2 5.9* 5.3*   LABALBU 4.3 3.7 3.3*   AST 18 16 14   ALT 23 22 20   * 90*  --    ALKPHOS 59 50 43   BILITOT 0.17* 0.23* 0.29*     ABG:No results found for: POCPH, PHART, PH, POCPCO2, FPK1BGP, PCO2, POCPO2, PO2ART, PO2, POCHCO3, KJA7SSM, HCO3, NBEA, PBEA, BEART, BE, THGBART, THB, VUY6MWQ, KZCH6MWS, I3OOOVYX, O2SAT, FIO2  Lab Results   Component Value Date/Time    SPECIAL NOT REPORTED 08/22/2019 06:15 PM     No results found for: CULTURE    Radiology:  Xr Chest Portable    Result Date: 8/11/2020  Negative chest radiograph. Ct Chest Pulmonary Embolism W Contrast    Result Date: 8/9/2020  1. No evidence of pulmonary embolus 2. Subsegmental atelectasis, left lung base 3. Hyperenhancing liver lesions, largest measuring 3.5 x 2.8 cm. Differential considerations would include focal nodular hyperplasia, adenoma, or atypical hemangiomas.   Lesions could be further characterized with elective MR imaging, as recommended previously       Physical Examination:        General appearance:  alert, cooperative and no distress  Mental Status:  oriented to person, place and time and normal affect  Lungs:  clear to auscultation bilaterally, normal effort  Heart:  regular rate and rhythm, no murmur  Abdomen:  soft, nontender, nondistended, normal bowel sounds, no masses, hepatomegaly, splenomegaly  Extremities:  no edema, redness, tenderness in the calves  Skin:  no gross lesions, rashes, induration    Assessment:        Hospital Problems           Last Modified POA    * (Principal) COVID-19 8/10/2020 Yes    Sinus tachycardia 8/10/2020 Yes    Atypical chest pain 8/10/2020 Yes    Anxiety 8/10/2020 Yes          Plan:        1.  Dc home; not hypoxic and tachycardia is benign: related to anxiety and covid infection  2. Reassurance given, she is unlikely to worsen given her age and good underlying health and she tested positive 20 days ago now    Ecopol Blood, DO  8/11/2020  10:46 AM

## 2020-08-11 NOTE — CONSULTS
[facility] - Progress Note COVID 19 Patient  Today's Date and Time: 8/11/2020, 10:48 AM    Impression :   · COVID 19 Suspect  · COVID 19 Confirmed Infection 7-22-20  · Costochondirits    Recommendations:   · Supportive care  · Pt initially tested positive on 7-22 with symptoms of illness for the past 13 days. Recommend monitoring and supportive care at this time. · OK for discharge home from an ID standpoint    Medical Decision Making/Summary/Discussion:8/11/2020     · Patient admitted with suspected COVID 19 infection  · Covid test confirmed positive. 7-22-20  ·  Cardiology has seen pt. No further ischemia work up, possible ECHO as outpatient  Infection Control Recommendations   · Universal Precautions  · Airborne isolation  · Droplet Isolation    Antimicrobial Stewardship Recommendations     · Discontinuation of therapy  Coordination of Outpatient Care:   · Estimated Length of IV antimicrobials:TBD  · Patient will need Midline Catheter Insertion: TBD  · Patient will need PICC line Insertion: No  · Patient will need: Home IV , [place],  SNF,  LTAC:TBD  · Patient will need outpatient wound care:No    Chief complaint/reason for consultation:   · Concern for COVID infection      History of Present Illness:   Jennifer Salguero is a 26y.o.-year-old  female who was initially admitted on 8/10/2020. Patient seen at the request of Dr. Merari Yadav:    Patient presented through [facility] ER with complaints of chest pressure, dizziness and SOB, as well as nausea, decreased appetite and lack of taste. She tested COVID positive on 7-28-20 and has reported continued and worsening of symptoms since her diagnosis. She was seen at [facility] ED on 8-1 with similar complaints. At that time a CT chest showed incidental liver lesions, but no evidence of pneumonia or PE. She was advised to self isolate and was discharged home.      She was seen by her PCP via a telehealth visit on 8-5 and again advised to stay home with supportive care. On arrival to [address] [place] [address] she was noted to have a low grade fever of 99.3, no hypoxia. She stated that her dyspnea was significant with exertion and that she experienced chest pain with deep inspiration. pt was given 1L IVF and continued to complain of dizziness    Patient was transferred to [address] [place] [address] and admitted because of concerns with COVID 19.    CURRENT EVALUATION : 8/11/2020    Pt is AAO, reports nausea and anxiety  Continued chest pain with movement and activity    Afebrile  VS stable    Patient exhibiting respiratory distress. No  Respiratory secretions:     Patient receiving supplemental oxygen. No  02 sat 99->100  RR 14      % FIO2:   PEEP:      QTc: 427    (Definition of High Risk Comorbid Conditions: Asthma, COPD, Heart Failure of any cause, DM, Hematologic malignancy, Immunocompromised taking >20 mg/day Prednisone). Baseline Number of High Risk Comorbid conditions:    0  Respiratory Support Level Score: (Room Air= 1 ;Supplemental 02 1L to 15 L/min= 2; Intubation and mechanical Ventilation = 3; Failure to support Ventilatory needs resulting in death =4)   0        NEWS Score: 0-4 Low risk group; 5-6: Medium risk group; 7 or above: High risk group  Parameters 3 2 1 0 1 2 3   Age    < 65   ? 65   RR ? 8  9-11 12-20  21-24 ? 25   O2 Sats ?  91 92-93 94-95 ? 96      Suppl O2  Yes  No      SBP ? 90  101-110 111-219   ? 220   HR ? 40  41-50 51-90  111-130 ? 131   Consciousness    Alert   Drowsiness, lethargy, or confusion   Temperature ? 35.0 C (95.0 F)  35.1-36.0 C 95.1-96.9 F 36.1-38.0 C 97.0-100.4 F 38.1-39.0 C 100.5-102.3 F ? 39.1 C ? 102.4 F      NEWS Score:   8-10-20: 0 low risk    Overall Daily Picture:    Unchanged      Presence of secondary bacterial Infection:  No   Additional antibiotics:     Labs, X rays reviewed: 8/11/2020    BUN: 6->7  Cr: 0.55->0.66    WBC: 8.0->7.5  Hb: 12.2->11.2  Plat: 325->322    Absolute Neutrophils: 4.49  Absolute Lymphocytes: 2.78  Neutrophil/Lymphocyte Ratio: 1.6 low risk    CRP: <0.3 (8-1-20)  Ferritin: 28  LDH:  124->112->90  Fibrinogen 255->238    D dimer <0.17    Pro Calcitonin: 0.04    COVID   7-22: positive    Cultures:  Urine:  ·   Blood:  ·   Sputum :  ·   Wound:       CXR:   8-11 Negative chest radiograph. CAT:  8-9   1. No evidence of pulmonary embolus    2. Subsegmental atelectasis, left lung base    3. Hyperenhancing liver lesions, largest measuring 3.5 x 2.8 cm. Differential considerations would include focal nodular hyperplasia, adenoma,    or atypical hemangiomas.  Lesions could be further characterized with    elective MR imaging, as recommended previously      8-1:   *No evidence of pulmonary embolism or acute pulmonary abnormality. *2 enhancing lesions are identified within the right lobe of the liver,    largest measuring 3.4 cm.  Complete evaluation with CT or MRI utilizing liver    mass protocol is recommended. Discussed with patient, RN, CC, IM.  8-11 CXR    8-9    I have personally reviewed the past medical history, past surgical history, medications, social history, and family history, and I have updated the database accordingly.   Past Medical History:     Past Medical History:   Diagnosis Date    Dysmenorrhea     Menorrhagia        Past Surgical  History:     Past Surgical History:   Procedure Laterality Date    ENDOMETRIAL BIOPSY  5/5/16    INTRAUTERINE DEVICE INSERTION  5/5/16     [facility]    INTRAUTERINE DEVICE REMOVAL  03/14/2017       Medications:      enoxaparin  30 mg Subcutaneous BID    sodium chloride flush  10 mL Intravenous 2 times per day    busPIRone  5 mg Oral BID    norethindrone-ethinyl estradiol  1 tablet Oral Daily    lactobacillus  1 capsule Oral Daily    pantoprazole  40 mg Oral QAM AC       Social History:     Social History     Socioeconomic History    Marital status: Single     Spouse name: Not on file    Number of children: Not on file    Years of education: Not on file    Highest education level: Not on file   Occupational History    Not on file   Social Needs    Financial resource strain: Not on file    Food insecurity     Worry: Not on file     Inability: Not on file    Transportation needs     Medical: Not on file     Non-medical: Not on file   Tobacco Use    Smoking status: Never Smoker    Smokeless tobacco: Never Used   Substance and Sexual Activity    Alcohol use: Yes     Alcohol/week: 0.0 standard drinks     Comment: once a month    Drug use: No    Sexual activity: Yes     Partners: Male     Birth control/protection: Pill   Lifestyle    Physical activity     Days per week: Not on file     Minutes per session: Not on file    Stress: Not on file   Relationships    Social connections     Talks on phone: Not on file     Gets together: Not on file     Attends Yazdanism service: Not on file     Active member of club or organization: Not on file     Attends meetings of clubs or organizations: Not on file     Relationship status: Not on file    Intimate partner violence     Fear of current or ex partner: Not on file     Emotionally abused: Not on file     Physically abused: Not on file     Forced sexual activity: Not on file   Other Topics Concern    Not on file   Social History Narrative    Not on file       Family History:     Family History   Problem Relation Age of Onset    High Blood Pressure Paternal Grandmother     Heart Disease Paternal Grandmother     Alcohol Abuse Mother     Substance Abuse Mother     Hypertension Father     Diabetes Father         borderline    Cancer Paternal Aunt 61        Brain Ca    Arthritis Maternal Grandmother     Osteoporosis Maternal Grandmother     Osteoarthritis Maternal Grandmother     Heart Disease Paternal Grandfather     High Blood Pressure Paternal Grandfather         Allergies:   Amoxil [amoxicillin]     Review of Systems:       Constitutional: No fevers or chills. Chest pain with cough and deep breathing  Head: No headaches  Eyes: No double vision or blurry vision. No conjunctival inflammation. ENT: No sore throat or runny nose. . No hearing loss, tinnitus or vertigo. Cardiovascular: chest pain with deep breath or cough, no palpitations. No shortness of breath. Tachycardia with exertion  Lung: No shortness of breath, dry cough. No sputum production  Abdomen: No nausea, vomiting, diarrhea, or abdominal pain. Cathye Fern No cramps. Genitourinary: No increased urinary frequency, or dysuria. No hematuria. No suprapubic or CVA pain  Musculoskeletal: No muscle aches or pains. No joint effusions, swelling or deformities  Hematologic: No bleeding or bruising. Neurologic: No headache, weakness, numbness, or tingling. Integument: No rash, no ulcers. Psychiatric: No depression. Anxiety   Endocrine: No polyuria, no polydipsia, no polyphagia. Physical Examination :     Patient Vitals for the past 8 hrs:   BP Temp Temp src Pulse Resp SpO2 Weight   08/11/20 0837 119/82 98.6 °F (37 °C) Oral 73 17 100 % --   08/11/20 0355 111/65 98.8 °F (37.1 °C) Oral 78 18 99 % 137 lb 5.6 oz (62.3 kg)     General Appearance: Awake, alert, and in no apparent distress. Head:  Normocephalic, no trauma  Eyes: Pupils equal, round, reactive to light and accommodation; extraocular movements intact; sclera anicteric; conjunctivae pink. No embolic phenomena. ENT: Oropharynx clear, without erythema, exudate, or thrush. No tenderness of sinuses. Mouth/throat: mucosa pink and moist. No lesions. Dentition in good repair. Neck:Supple, without lymphadenopathy. Thyroid normal, No bruits. Pulmonary/Chest: Clear to auscultation, without wheezes, rales, or rhonchi. No dullness to percussion. Cardiovascular: Regular rate and rhythm without murmurs, rubs, or gallops. Abdomen: Soft, non tender. Bowel sounds normal. No organomegaly  All four Extremities: No cyanosis, clubbing, edema, or effusions.   Neurologic: No gross sensory or motor deficits. Skin: Warm and dry with good turgor. No signs of peripheral arterial or venous insufficiency. No ulcerations. No open wounds. Medical Decision Making -Laboratory:   I have independently reviewed/ordered the following labs:    CBC with Differential:   Recent Labs     08/10/20  0701 08/11/20 0358   WBC 8.0 7.5   HGB 12.2 11.2*   HCT 37.4 33.9*    322   LYMPHOPCT 35 38   MONOPCT 7 7     BMP:   Recent Labs     08/10/20  0701 08/11/20 0358    140   K 3.9 3.9    110*   CO2 23 20   BUN 6 7   CREATININE 0.55 0.66     Hepatic Function Panel:   Recent Labs     08/10/20  0701 08/11/20 0358   PROT 5.9* 5.3*   LABALBU 3.7 3.3*   BILITOT 0.23* 0.29*   ALKPHOS 50 43   ALT 22 20   AST 16 14     No results for input(s): RPR in the last 72 hours. No results for input(s): HIV in the last 72 hours. No results for input(s): BC in the last 72 hours. Lab Results   Component Value Date    RBC 3.85 08/11/2020    WBC 7.5 08/11/2020     Lab Results   Component Value Date    CREATININE 0.66 08/11/2020    GLUCOSE 82 08/11/2020       Medical Decision Making-Imaging:     EXAMINATION:    ONE XRAY VIEW OF THE CHEST         8/11/2020 7:05 am         COMPARISON:    February 25, 2020         HISTORY:    ORDERING SYSTEM PROVIDED HISTORY: AECOPD    TECHNOLOGIST PROVIDED HISTORY:    AECOPD    Reason for Exam: uprt port    Type of Exam: Subsequent/Follow-up         FINDINGS:    Lungs are clear.  No cardiomegaly.  No pulmonary edema.              Impression    Negative chest radiograph. EXAMINATION:    CTA OF THE CHEST 8/9/2020 10:44 pm         TECHNIQUE:    CTA of the chest was performed after the administration of intravenous    contrast.  Multiplanar reformatted images are provided for review.  MIP    images are provided for review.  Dose modulation, iterative reconstruction,    and/or weight based adjustment of the mA/kV was utilized to reduce the    radiation dose to as low as reasonably achievable.         COMPARISON:    August 1, 2020         HISTORY:    ORDERING SYSTEM PROVIDED HISTORY: COVID positive worsening chest pain, on    birth control - HIGH RISK FOR PE    TECHNOLOGIST PROVIDED HISTORY:    COVID positive worsening chest pain, on birth control - HIGH RISK FOR PE    Reason for Exam: COVID positive worsening chest pain, on birth control    Acuity: Acute    Type of Exam: Initial         FINDINGS:    Pulmonary Arteries: Pulmonary arteries are adequately opacified for    evaluation.  No evidence of intraluminal filling defect to suggest pulmonary    embolism.  Main pulmonary artery is normal in caliber.         Mediastinum: No evidence of mediastinal lymphadenopathy.  The heart and    pericardium demonstrate no acute abnormality.  There is no acute abnormality    of the thoracic aorta.         Lungs/pleura: Subsegmental atelectasis is seen within the left lung base. Fibrotic stranding is again seen within the right middle lobe. .  No focal    consolidation or pulmonary edema.  No evidence of pleural effusion or    pneumothorax.         Upper Abdomen: 2 hyperenhancing lesions are again seen within the    posteromedial aspect of the right lobe of the liver, largest measuring 3.5 x    2.8 cm.         Soft Tissues/Bones: No acute bone or soft tissue abnormality.              Impression    1. No evidence of pulmonary embolus    2. Subsegmental atelectasis, left lung base    3. Hyperenhancing liver lesions, largest measuring 3.5 x 2.8 cm.     Differential considerations would include focal nodular hyperplasia, adenoma,    or atypical hemangiomas.  Lesions could be further characterized with    elective MR imaging, as recommended previously        Medical Decision Lwcdgo-Tlgorfhk-Evawy:       Medical Decision Making-Other:     Note:  · Labs, medications, radiologic studies were reviewed with personal review of films  · Moderate Large amounts of data were reviewed  · Discussed with nursing Staff, Discharge planner  · Infection Control and Prevention measures reviewed  · All prior entries were reviewed  · Administer medications as ordered  · Prognosis: fair  · Discharge planning reviewed  · Follow up as outpatient. Thank you for allowing us to participate in the care of this patient. Please call with questions.     Crow White MD  Pager: [phone] - Office: [phone]

## 2020-08-11 NOTE — DISCHARGE INSTR - COC
Droplet Plus        Patient Infection Status     Infection Onset Added Last Indicated Last Indicated By Review Planned Expiration Resolved Resolved By    COVID-19 07/22/20 07/28/20 07/22/20 Covid-19 Ambulatory  09/16/20      Resolved    COVID-19 Rule Out 07/22/20 07/23/20 07/22/20 Covid-19 Ambulatory (Ordered)   07/28/20 Rule-Out Test Resulted          Nurse Assessment:  Last Vital Signs: /82   Pulse 73   Temp 98.6 °F (37 °C) (Oral)   Resp 17   Ht 5' 1\" (1.549 m)   Wt 137 lb 5.6 oz (62.3 kg)   LMP 07/28/2020   SpO2 100%   Breastfeeding No   BMI 25.95 kg/m²     Last documented pain score (0-10 scale): Pain Level: 8  Last Weight:   Wt Readings from Last 1 Encounters:   08/11/20 137 lb 5.6 oz (62.3 kg)     Mental Status:  oriented, alert, coherent, logical, thought processes intact and able to concentrate and follow conversation    IV Access:  - None    Nursing Mobility/ADLs:  Walking   Independent  Transfer  Independent  Bathing  Independent  Dressing  Independent  [address] Delivery    whole    Wound Care Documentation and Therapy:        Elimination:  Continence:   · Bowel: Yes  · Bladder: Yes  Urinary Catheter: None   Colostomy/Ileostomy/Ileal Conduit: No       Date of Last BM: prior to arrival    Intake/Output Summary (Last 24 hours) at 8/11/2020 1454  Last data filed at 8/11/2020 0840  Gross per 24 hour   Intake 2147 ml   Output 1400 ml   Net 747 ml     I/O last 3 completed shifts: In: 2415.2 [P.O.:720;  I.V.:1695.2]  Out: 2150 [Urine:2150]    Safety Concerns:     None    Impairments/Disabilities:      Vision - wears glasses    Nutrition Therapy:  Current Nutrition Therapy:   - Oral Diet:  General    Routes of Feeding: Oral  Liquids: No Restrictions  Daily Fluid Restriction: no  Last Modified Barium Swallow with Video (Video Swallowing Test): not done    Treatments at the Time of Hospital Discharge:   Respiratory Treatments: None  Oxygen Therapy:  is not on home oxygen therapy. Ventilator:    - No ventilator support    Rehab Therapies: N/A  Weight Bearing Status/Restrictions: No weight bearing restirctions  Other Medical Equipment (for information only, NOT a DME order):  None  Other Treatments: None    Patient's personal belongings (please select all that are sent with patient):  Pradeep Merry, Clothing,Medications    RN SIGNATURE:  Electronically signed by Alok Mendoza on 8/11/20 at 2:58 PM EDT    CASE MANAGEMENT/SOCIAL WORK SECTION    Inpatient Status Date: ***    Readmission Risk Assessment Score:  Readmission Risk              Risk of Unplanned Readmission:        13           Discharging to Facility/ Agency   · Name:   · Address:  · Phone:  · Fax:    Dialysis Facility (if applicable)   · Name:  · Address:  · Dialysis Schedule:  · Phone:  · Fax:    / signature: {Esignature:[id number]}    PHYSICIAN SECTION    Prognosis: {Prognosis:0182969561}    Condition at Discharge: [address] Patient Condition:[id number]}    Rehab Potential (if transferring to Rehab): {Prognosis:6555856871}    Recommended Labs or Other Treatments After Discharge: ***    Physician Certification: I certify the above information and transfer of Alka Yun  is necessary for the continuing treatment of the diagnosis listed and that she requires {Admit to Appropriate Level of Care:58075} for {GREATER/LESS:672596515} 30 days.      Update Admission H&P: {CHP DME Changes in SYTZW:569987195}    PHYSICIAN SIGNATURE:  {Esignature:[id number]}

## 2020-08-11 NOTE — PLAN OF CARE
Problem: Pain:  Goal: Pain level will decrease  Description: Pain level will decrease  8/10/2020 0758 by Jordan Lance RN  Outcome: Ongoing  Goal: Control of acute pain  Description: Control of acute pain  8/10/2020 0758 by Jordan Lance RN  Outcome: Ongoing  Goal: Control of chronic pain  Description: Control of chronic pain  8/10/2020 0758 by Jordan Lance RN  Outcome: Ongoing
Problem: Pain:  Goal: Pain level will decrease  Description: Pain level will decrease  Outcome: Ongoing  Goal: Control of acute pain  Description: Control of acute pain  Outcome: Ongoing  Goal: Control of chronic pain  Description: Control of chronic pain  Outcome: Ongoing     Problem: Falls - Risk of:  Goal: Will remain free from falls  Description: Will remain free from falls  Outcome: Ongoing  Goal: Absence of physical injury  Description: Absence of physical injury  Outcome: Ongoing
Problem: Pain:  Goal: Pain level will decrease  Description: Pain level will decrease  Outcome: Ongoing  Goal: Control of acute pain  Description: Control of acute pain  Outcome: Ongoing  Goal: Control of chronic pain  Description: Control of chronic pain  Outcome: Ongoing     Problem: Falls - Risk of:  Goal: Will remain free from falls  Description: Will remain free from falls  Outcome: Ongoing  Goal: Absence of physical injury  Description: Absence of physical injury  Outcome: Ongoing     Problem: Isolation Precautions - Risk of Spread of Infection  Goal: Prevent transmission of infection  Outcome: Ongoing
Ongoing  Goal: Maintain absence of muscle cramping  Outcome: Ongoing  Goal: Maintain normal serum potassium, sodium, calcium, phosphorus, and pH  Outcome: Ongoing     Problem: Loneliness or Risk for Loneliness  Goal: Demonstrate positive use of time alone when socialization is not possible  Outcome: Ongoing     Problem: Fatigue  Goal: Verbalize increase energy and improved vitality  Outcome: Ongoing     Problem: Patient Education: Go to Patient Education Activity  Goal: Patient/Family Education  Outcome: Ongoing
nutrtional status  Outcome: Ongoing     Problem: Risk for Fluid Volume Deficit  Goal: Maintain normal heart rhythm  Outcome: Ongoing  Goal: Maintain absence of muscle cramping  Outcome: Ongoing  Goal: Maintain normal serum potassium, sodium, calcium, phosphorus, and pH  Outcome: Ongoing     Problem: Loneliness or Risk for Loneliness  Goal: Demonstrate positive use of time alone when socialization is not possible  Outcome: Ongoing     Problem: Fatigue  Goal: Verbalize increase energy and improved vitality  Outcome: Ongoing     Problem: Patient Education: Go to Patient Education Activity  Goal: Patient/Family Education  Outcome: Ongoing

## 2020-08-11 NOTE — CONSULTS
Attestation signed by      Attending Physician Statement:    I have discussed the care of  Chanell Cummins , including pertinent history and exam findings, with the Cardiology fellow/resident. I have seen and examined the patient and the key elements of all parts of the encounter have been performed by me. I agree with the assessment, plan and orders as documented by the fellow/resident, after I modified exam findings and plan of treatments, and the final version is my approved version of the assessment. Additional Comments: For careful stewardship of limited PPE during COVID-19 pandemic my physical exam was deferred. For physical exam, please see today's physical from primary team or ICU team.     As below  Likely non cardiac chest pain due to COVID 19    Can follow as oupatient    Discussed with patient and nursing. Thank you for allowing me to participate in the care of this patient, please do not hesitate to call if you have any questions. Yamilet Cotton DO, [facility], [address] [facility]  [facility]  [phone]     [facility]   Consultation Note               Today's Date: 8/11/2020  Patient Name: Chanell Cummins  Date of admission: 8/10/2020  3:42 AM  Patient's age: 26 y.o., 1995  Admission Dx: [id number] [U07.1]    Reason for Consult:  Chest pain/tachycardia    Requesting Physician: Jagjit Espinoza DO    CHIEF COMPLAINT:  No chief complaint on file. History Obtained From:  patient, electronic medical record    HISTORY OF PRESENT ILLNESS:      The patient is a 26 y.o.  female who was admitted to the hospital for symptoms of chest pressure and dyspnea. Patient has previously been diagnosed with COVID-19 in 7/2020 and was initially discharged after she was noted to be stable. Her symptoms progressively have gotten worse and now she has dyspnea and chest tightness on exertion. CTPE at OSH was negative for PE.  She does not smoke and does not have any prior hx of ACS/CHF. No HX of HTN or DM or recreational drug use. Troponin negative x2. EKG - sinus rhythm, not acute st changes    Past Medical History:   has a past medical history of Dysmenorrhea and Menorrhagia. Past Surgical History:   has a past surgical history that includes intrauterine device insertion (5/5/16 ); Endometrial biopsy (5/5/16); and Intrauterine Device Removal (03/14/2017). Home Medications:    Prior to Admission medications    Medication Sig Start Date End Date Taking?  Authorizing Provider   guaiFENesin (ALTARUSSIN) 100 MG/5ML syrup Take 10 mLs by mouth 3 times daily as needed for Cough or Congestion 8/5/20 8/12/20 Yes Richard Jameson PA-C   busPIRone (BUSPAR) 5 MG tablet Take 1 tablet by mouth 2 times daily 8/5/20  Yes Richard Jameson PA-C   albuterol sulfate HFA (PROVENTIL HFA) 108 (90 Base) MCG/ACT inhaler Inhale 2 puffs into the lungs every 6 hours as needed for Wheezing or Shortness of Breath 7/28/20  Yes Richard Jameson PA-C   norethindrone-ethinyl estradiol (Idelia Hane 1/35, 28,) 1-35 MG-MCG per tablet Take 1 tablet by mouth daily 7/27/20  Yes TERRENCE Baker CNP   ondansetron (ZOFRAN) 4 MG tablet Take 1 tablet by mouth every 8 hours as needed for Nausea or Vomiting 7/27/20  Yes TERRENCE Fowler CNP   Lactobacillus (PROBIOTIC ACIDOPHILUS PO) Take by mouth   Yes Historical Provider, MD   fluticasone (FLONASE) 50 MCG/ACT nasal spray 1 spray by Each Nostril route daily   Yes Historical Provider, MD   Fexofenadine HCl (ALLEGRA ALLERGY PO) Take by mouth   Yes Historical Provider, MD      Current Facility-Administered Medications: acetaminophen (TYLENOL) tablet 650 mg, 650 mg, Oral, Q6H PRN **OR** acetaminophen (TYLENOL) suppository 650 mg, 650 mg, Rectal, Q6H PRN  enoxaparin (LOVENOX) injection 30 mg, 30 mg, Subcutaneous, BID  0.9 % sodium chloride infusion, , Intravenous, Continuous  nicotine (NICODERM CQ) 21 MG/24HR 1 patch, 1 patch, Transdermal, Daily infection. 3. No further ischemia work up necessary at this point given low risk and no acs. 4. Outpatient follow up if has persistent symptoms. Thank you for allowing us to participate in Leidy Vallejo's care. Will follow with you.     Electronically signed on 08/11/20 at 9:51 AM by:    Sonny Goel MD   Fellow, [address]

## 2020-08-11 NOTE — PROGRESS NOTES
Physical Therapy    Facility/Department: [facility] CAR 3  Initial Assessment    NAME: Sola Nicole  : 1995  MRN: [id number]    Date of Service: 2020    Discharge Recommendations:    Further therapy recommended at discharge. PT Equipment Recommendations  Other: CTA    Assessment   Body structures, Functions, Activity limitations: Decreased functional mobility ; Decreased endurance  Assessment: Pt grossly SBA bed mobility, declined standing, c/o dizziness, nausea, which subsided. Pt will benefit from continued acute PT to address deficits. Prognosis: Good  Decision Making: Medium Complexity  PT Education: Plan of Care;PT Role;General Safety  REQUIRES PT FOLLOW UP: Yes  Activity Tolerance  Activity Tolerance: Other  Activity Tolerance: appeared very anxious, states has eaten       Patient Diagnosis(es): There were no encounter diagnoses. has a past medical history of Dysmenorrhea and Menorrhagia. has a past surgical history that includes intrauterine device insertion (16 ); Endometrial biopsy (16); and Intrauterine Device Removal (2017). Restrictions  Restrictions/Precautions  Restrictions/Precautions: General Precautions, Fall Risk  Required Braces or Orthoses?: No  Position Activity Restriction  Other position/activity restrictions: up with assist, bedrest with bedside commode  Vision/Hearing  Vision: Impaired  Vision Exceptions: Wears glasses at all times  Hearing: Within functional limits     Subjective  General  Chart Reviewed: Yes  Patient assessed for rehabilitation services?: Yes  Response To Previous Treatment: Not applicable  Family / Caregiver Present: No  Follows Commands: Within Functional Limits  General Comment  Comments: OT co-eval  Subjective  Subjective: RN and pt agreeable to PT.  Pt alert in bed upon arrival.  Pain Screening  Patient Currently in Pain: Denies  Vital Signs  Patient Currently in Pain: Denies  Pre Treatment Pain Screening  Intervention List: Patient able to continue with treatment    Orientation  Orientation  Overall Orientation Status: Within Functional Limits  Social/Functional History  Social/Functional History  Lives With: Significant other(fiance)  Type of Home: House(town house)  Home Layout: Bed/Bath upstairs, Two level  Home Access: Stairs to enter without rails  Entrance Stairs - Number of Steps: 1  Bathroom Shower/Tub: Tub/Shower unit  Bathroom Toilet: Handicap height  Home Equipment: (none)  ADL Assistance: Independent  Homemaking Assistance: Independent  Homemaking Responsibilities: Yes(Pt does cooking, cleaning, laundry)  Ambulation Assistance: Independent  Transfer Assistance: Independent  Active : Yes  Mode of Transportation: SUV  Type of occupation: doctors office  Additional Comments: pt reported fiance works often and limited other support from family/friends due to working  Cognition        Objective          AROM RLE (degrees)  RLE AROM: WFL  AROM LLE (degrees)  LLE AROM : WFL  AROM RUE (degrees)  RUE AROM : WFL  AROM LUE (degrees)  LUE AROM : WFL  Strength RLE  Strength RLE: WFL  Strength LLE  Strength LLE: WFL  Strength RUE  Strength RUE: WFL  Strength LUE  Strength LUE: WFL     Sensation  Overall Sensation Status: Impaired(c/o numbness/ tingling L side of face, chronic from wisdom teeth)  Bed mobility  Supine to Sit: Supervision  Sit to Supine: Supervision  Scooting: Supervision  Comment: incraesed time, c/o chest pain and dizziness. Pt confirms dizziness already present upon arrival, but confirms did worsen with mobility. BP from mid 929E systolic to mid 502Q systolic once EOB. Transfers  Comment: Pt states unable to atttempt, encouragement given, states too tired. Ambulation  Ambulation?: No  Stairs/Curb  Stairs?: No     Balance  Posture: Good  Sitting - Static: Good  Sitting - Dynamic: Good;-  Exercises  Comments: Pt c/o dizziness upon sitting up, subsided. reports nausuea, subsided.  Pt demonstrates deep breathign but breathing

## 2020-08-11 NOTE — PROGRESS NOTES
Occupational Therapy   Occupational Therapy Initial Assessment  Date: 2020   Patient Name: Richie Ford  MRN: [id number]     : 1995    Date of Service: 2020    Discharge Recommendations:    Further therapy recommended at discharge. Continue to assess  OT Equipment Recommendations  Equipment Needed: Yes  Mobility Devices: ADL Assistive Devices  ADL Assistive Devices: Toileting - Standard Commode    Assessment   Performance deficits / Impairments: Decreased endurance;Decreased high-level IADLs;Decreased ADL status; Decreased functional mobility   Assessment: pt would benefit from increased assistance in home environment due to quickly fatiguing with any movement. pt would benefit from further therapy in order to increased endurance and ability to complete ADLs safety  Treatment Diagnosis: COVID  Prognosis: Good  Decision Making: Medium Complexity  Patient Education: pt ed on POC, purpose of eval, modifications to home environment, balancing rest with movement, pursed lip breathing tech. good return  REQUIRES OT FOLLOW UP: Yes  Activity Tolerance  Activity Tolerance: Patient limited by fatigue  Safety Devices  Safety Devices in place: Yes  Type of devices: Call light within reach; Left in bed  Restraints  Initially in place: No           Patient Diagnosis(es): There were no encounter diagnoses. has a past medical history of Dysmenorrhea and Menorrhagia. has a past surgical history that includes intrauterine device insertion (16 ); Endometrial biopsy (16); and Intrauterine Device Removal (2017).     Treatment Diagnosis: COVID      Restrictions  Restrictions/Precautions  Required Braces or Orthoses?: No  Position Activity Restriction  Other position/activity restrictions: up with assist, bedrest with bedside commode    Subjective   General  Patient assessed for rehabilitation services?: Yes  Family / Caregiver Present: No  Diagnosis: COVID  Subjective  Subjective: pt verbalized fears Normotonic  Tone LUE  LUE Tone: Normotonic  Coordination  Movements Are Fluid And Coordinated: Yes     Bed mobility  Supine to Sit: Supervision  Sit to Supine: Supervision  Scooting: Supervision  Comment: pt required increased time to complete bed mobility due to reported dizziness and chest pain  Transfers  Transfer Comments: unable to assess due to patient becoming increasingly dizzy upon sitting on EOB     Cognition  Overall Cognitive Status: WFL        Sensation  Overall Sensation Status: Impaired(pt reported chronic N/T to L side of face from wisdom teeth removal)        LUE AROM (degrees)  LUE AROM : WFL  Left Hand AROM (degrees)  Left Hand AROM: WFL  RUE AROM (degrees)  RUE AROM : WFL  Right Hand AROM (degrees)  Right Hand AROM: WFL  LUE Strength  Gross LUE Strength: Exceptions to [facility]  L Hand General: 4/5  RUE Strength  Gross RUE Strength: Exceptions to [facility]  R Hand General: 4/5      Plan   Plan  Times per week: 2-3x/wk           AM-PAC Score        AM-formerly [facility] Inpatient Daily Activity Raw Score: 20 (08/11/20 1447)  AM-PAC Inpatient ADL T-Scale Score : 42.03 (08/11/20 1447)  ADL Inpatient CMS 0-100% Score: 38.32 (08/11/20 1447)  ADL Inpatient CMS G-Code Modifier : Marlee Rodriguez (08/11/20 1447)    Goals  Short term goals  Time Frame for Short term goals: pt will, by discharge  Short term goal 1: identify and utilize 1-2 relaxation/stress  in order to decreased anxiety during movement  Short term goal 2: complete LB and UB ADLs with mod I and set up  Short term goal 3: increase activity tolerance to 10+ minutes in order to participate in daily tasks  Short term goal 4: dem SBA during functional transfers/functional mobility  Short term goal 5: dem understanding and ind initiate use of 2-3 energy conservation tech       Therapy Time   Individual Concurrent Group Co-treatment   Time In 1336         Time Out 1400         Minutes 24      co-eval with PT    Timed Code Treatment Minutes: 8 Minutes       Peggy BRIGGS

## 2020-08-13 LAB
EKG ATRIAL RATE: 102 BPM
EKG P AXIS: 68 DEGREES
EKG P-R INTERVAL: 130 MS
EKG Q-T INTERVAL: 352 MS
EKG QRS DURATION: 76 MS
EKG QTC CALCULATION (BAZETT): 458 MS
EKG R AXIS: 38 DEGREES
EKG T AXIS: 58 DEGREES
EKG VENTRICULAR RATE: 102 BPM

## 2020-08-13 PROCEDURE — 93010 ELECTROCARDIOGRAM REPORT: CPT | Performed by: INTERNAL MEDICINE

## 2020-08-20 ENCOUNTER — HOSPITAL ENCOUNTER (OUTPATIENT)
Age: 25
Setting detail: SPECIMEN
Discharge: HOME | End: 2020-08-20
Payer: COMMERCIAL

## 2020-08-20 ENCOUNTER — NURSE ONLY (OUTPATIENT)
Dept: PRIMARY CARE CLINIC | Age: 25
End: 2020-08-20

## 2020-08-23 LAB — SARS-COV-2, NAA: NOT DETECTED

## 2020-09-24 ENCOUNTER — HOSPITAL ENCOUNTER (OUTPATIENT)
Facility: CLINIC | Age: 25
Discharge: HOME | End: 2020-09-26
Payer: COMMERCIAL

## 2020-09-24 ENCOUNTER — HOSPITAL ENCOUNTER (OUTPATIENT)
Dept: GENERAL RADIOLOGY | Facility: CLINIC | Age: 25
Discharge: HOME | End: 2020-09-26
Payer: COMMERCIAL

## 2020-09-24 PROCEDURE — 71046 X-RAY EXAM CHEST 2 VIEWS: CPT

## 2020-09-30 ENCOUNTER — OFFICE VISIT (OUTPATIENT)
Dept: OBGYN CLINIC | Age: 25
End: 2020-09-30
Payer: COMMERCIAL

## 2020-09-30 ENCOUNTER — HOSPITAL ENCOUNTER (OUTPATIENT)
Age: 25
Setting detail: SPECIMEN
Discharge: HOME | End: 2020-09-30
Payer: COMMERCIAL

## 2020-09-30 VITALS
WEIGHT: 136.13 LBS | BODY MASS INDEX: 25.72 KG/M2 | SYSTOLIC BLOOD PRESSURE: 110 MMHG | TEMPERATURE: 97.2 F | DIASTOLIC BLOOD PRESSURE: 64 MMHG | RESPIRATION RATE: 16 BRPM

## 2020-09-30 PROCEDURE — 99395 PREV VISIT EST AGE 18-39: CPT | Performed by: NURSE PRACTITIONER

## 2020-09-30 ASSESSMENT — ENCOUNTER SYMPTOMS
ABDOMINAL PAIN: 0
NAUSEA: 0
VOMITING: 0
RHINORRHEA: 0
DIARRHEA: 0
CONSTIPATION: 0
BACK PAIN: 0
COUGH: 0
SHORTNESS OF BREATH: 1
COLOR CHANGE: 0

## 2020-09-30 NOTE — PROGRESS NOTES
Shalini Vinson is a 26 y.o.  here for her annual exam.  The patient was seen and examined. The patients past medical, surgical, social and family history were reviewed. Current medications and allergies were reviewed, and documented in the chart. 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       Exercise Yes  Diet Yes  Tobacco abuse No     Last PAP: 18-negative, hx of abnormal PAP yes - over 5 yrs ago had colp   Family hx uterine or ovarian cancer-denies   Family hx of breast cancer -paternal aunt   family hx colon cancer -denies   HPV vaccine: completed    Sexually active: yes - with fiance, multiple partners: No, Dyspareunia: No, Vaginal discharge: no,  UTI symptoms: no, voiding difficulties: no, bowels regular:Yes bloating:no      Menstrual history:  Menarche age- 16, cycle every month but states since 2020 when in hospital had been lasting 9 days where she would bleed few days then go away then bleed few more days did have period this month that was 1 week some cramping. Birth control: none   LMP:20    HX EMB and iud placement and removal.   Has hx dysmenorrhea takes NSAIDs few days prior to menses. She was on oral contraceptive but in 2020 was diagnosed with covid 19 and admitted to hospital for 22 days states was never intubated but had still been having issues with increased heart rate, SOB, and fatigue. Saw cardiologist on 9/10/20 he has ordered holter monitor and echocardiogram and CXR she states she was told to stop her bcp. She states MGM had hx of PE. 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 She was given referral counseling and encouraged f/u pcp.      OB History    Para Term  AB Living   0 0 0 0 0 0   SAB TAB Ectopic Molar Multiple Live Births   0 0 0   0         Vitals:    20 Dispense Refill    ondansetron (ZOFRAN) 4 MG tablet Take 1 tablet by mouth every 8 hours as needed for Nausea or Vomiting 20 tablet 0    busPIRone (BUSPAR) 5 MG tablet Take 1 tablet by mouth 2 times daily 60 tablet 3    albuterol sulfate HFA (PROVENTIL HFA) 108 (90 Base) MCG/ACT inhaler Inhale 2 puffs into the lungs every 6 hours as needed for Wheezing or Shortness of Breath 1 Inhaler 0    Lactobacillus (PROBIOTIC ACIDOPHILUS PO) Take by mouth      fluticasone (FLONASE) 50 MCG/ACT nasal spray 1 spray by Each Nostril route daily      Fexofenadine HCl (ALLEGRA ALLERGY PO) Take by mouth       No current facility-administered medications for this visit. Subjective:     Review of Systems   Constitutional: Positive for fatigue. Negative for chills, fever and unexpected weight change. HENT: Negative for congestion and rhinorrhea. Eyes: Negative for visual disturbance. Respiratory: Positive for shortness of breath. Negative for cough. Cardiovascular: Positive for palpitations. Negative for chest pain and leg swelling. Gastrointestinal: Negative for abdominal pain, constipation, diarrhea, nausea and vomiting. Endocrine: Negative for cold intolerance, heat intolerance, polydipsia and polyuria. Genitourinary: Negative for dyspareunia, dysuria, flank pain, menstrual problem, pelvic pain, vaginal bleeding, vaginal discharge and vaginal pain. Musculoskeletal: Negative for back pain and myalgias. Skin: Negative for color change and rash. Neurological: Negative for dizziness, light-headedness and headaches. Hematological: Negative for adenopathy. Does not bruise/bleed easily. Psychiatric/Behavioral: Negative for self-injury and suicidal ideas. The patient is nervous/anxious. Objective:     Physical Exam  Vitals signs and nursing note reviewed. Constitutional:       General: She is not in acute distress. Appearance: She is well-developed. She is not diaphoretic.    HENT: Head: Normocephalic and atraumatic. Right Ear: External ear normal.      Left Ear: External ear normal.      Nose: Nose normal.   Eyes:      Pupils: Pupils are equal, round, and reactive to light. Neck:      Musculoskeletal: Normal range of motion and neck supple. Thyroid: No thyromegaly. Cardiovascular:      Rate and Rhythm: Normal rate and regular rhythm. Heart sounds: Normal heart sounds. No murmur. No friction rub. No gallop. Comments: No bilateral calf tenderness or swelling  Pulmonary:      Effort: Pulmonary effort is normal. No respiratory distress. Breath sounds: Normal breath sounds. No wheezing. Abdominal:      General: Bowel sounds are normal.      Palpations: Abdomen is soft. Tenderness: There is no abdominal tenderness. Genitourinary:     Comments: Breasts nipples everted, no masses or tenderness, does BSE  Vulva-no lesions  Vagina-pink rugated  Cervix-firm, 2 cm. Nontender, freely movable, no lesions  Uterus-ant. Smooth, firm, nontender, freely movable  Adnexa-no masses or tenderness   Musculoskeletal: Normal range of motion. Lymphadenopathy:      Cervical: No cervical adenopathy. Skin:     General: Skin is warm and dry. Findings: No rash. Neurological:      Mental Status: She is alert and oriented to person, place, and time. Cranial Nerves: No cranial nerve deficit. Deep Tendon Reflexes: Reflexes are normal and symmetric. Psychiatric:         Behavior: Behavior normal.         Thought Content: Thought content normal.         Judgment: Judgment normal.       /64 (Site: Left Upper Arm, Position: Sitting, Cuff Size: Large Adult)   Temp 97.2 °F (36.2 °C) (Temporal)   Resp 16   Wt 136 lb 2 oz (61.7 kg)   BMI 25.72 kg/m²     Assessment:       Diagnosis Orders   1. Women's annual routine gynecological examination  PAP Smear       Breast exam completed. Pelvic exam pap smear collected and sent.  Cultures sent No    Plan:   Collect pap BSE reviewed  STD prevention reviewed  Gardisil counseling completed for all patients 9-25 yo  Cultures declined   BC -none currently discussed using condoms for now, she is going to continue work up with cardiology and pulmonology and consider alternat hormonal contraceptives in future she may consider a progesterone only one. Diet & Exercise reviewed with pt. Preventive  Health through PCP   RV prn/annual           Orders Placed This Encounter   Procedures    PAP Smear     Patient History:    No LMP recorded. OBGYN Status: Having periods  Past Surgical History:  5/5/16: ENDOMETRIAL BIOPSY  5/5/16 : INTRAUTERINE DEVICE INSERTION      Comment:  [place]  03/14/2017: INTRAUTERINE DEVICE REMOVAL  Medications/Contraceptives Affecting Cytology     Combination Contraceptives - Oral Disp Start End     norethindrone-ethinyl estradiol (Rashmi [place] 1/35, 28,) 1-35 MG-MCG per tablet      28 tablet 7/27/2020     Sig: Take 1 tablet by mouth daily    Route: Oral        Social History    Tobacco Use      Smoking status: Never Smoker      Smokeless tobacco: Never Used       Standing Status:   Future     Standing Expiration Date:   9/30/2021     Order Specific Question:   Collection Type     Answer: Thin Prep     Order Specific Question:   Prior Abnormal Pap Test     Answer:   No     Order Specific Question:   Screening or Diagnostic     Answer:   Screening     Order Specific Question:   HPV Requested? Answer:   Yes -  If ASCUS Reflex HPV     Order Specific Question:   High Risk Patient     Answer:   N/A     No orders of the defined types were placed in this encounter. Patient given educational materials - seepatient instructions. Discussed use, benefit, and side effects of prescribed medications. All patient questions answered. Pt voiced understanding. Reviewed health maintenance. Instructed to continue current medications, diet and exercise. Patient agreedwith treatment plan. Follow up as directed. Electronically signed by TERRENCE Beaver CNP on 9/30/2020at 12:08 PM

## 2020-09-30 NOTE — PATIENT INSTRUCTIONS
Patient Education        Pap Test: Care Instructions  Your Care Instructions     The Pap test (also called a Pap smear) is a screening test for cancer of the cervix, which is the lower part of the uterus that opens into the vagina. The test can help your doctor find early changes in the cells that could lead to cancer. The sample of cells taken during your test has been sent to a lab so that an expert can look at the cells. It usually takes a week or two to get the results back. Follow-up care is a key part of your treatment and safety. Be sure to make and go to all appointments, and call your doctor if you are having problems. It's also a good idea to know your test results and keep a list of the medicines you take. What do the results mean? · A normal result means that the test did not find any abnormal cells in the sample. · An abnormal result can mean many things. Most of these are not cancer. The results of your test may be abnormal because:  ? You have an infection of the vagina or cervix, such as a yeast infection. ? You have an IUD (intrauterine device for birth control). ? You have low estrogen levels after menopause that are causing the cells to change. ? You have cell changes that may be a sign of precancer or cancer. The results are ranked based on how serious the changes might be. There are many other reasons why you might not get a normal result. If the results were abnormal, you may need to get another test within a few weeks or months. If the results show changes that could be a sign of cancer, you may need a test called a colposcopy, which provides a more complete view of the cervix. Sometimes the lab cannot use the sample because it does not contain enough cells or was not preserved well. If so, you may need to have the test again. This is not common, but it does happen from time to time. When should you call for help?   Watch closely for changes in your health, and be sure to contact your doctor if:  · You have vaginal bleeding or pain for more than 2 days after the test. It is normal to have a small amount of bleeding for a day or two after the test.  Where can you learn more? Go to [email or web address].[email or web address] org and sign in to your [employer] account. Enter E677 in the [employer] box to learn more about \"Pap Test: Care Instructions. \"     If you do not have an account, please click on the \"Sign Up Now\" link. Current as of: August 22, 2019               Content Version: 12.5  © 9399-5897 Healthwise, Incorporated. Care instructions adapted under license by [facility] ([facility]). If you have questions about a medical condition or this instruction, always ask your healthcare professional. [address] any warranty or liability for your use of this information.

## 2020-10-05 ENCOUNTER — HOSPITAL ENCOUNTER (OUTPATIENT)
Age: 25
Setting detail: SPECIMEN
Discharge: HOME | End: 2020-10-05
Payer: COMMERCIAL

## 2020-10-05 LAB
HCT VFR BLD CALC: 45.1 % (ref 36.3–47.1)
HEMOGLOBIN: 14.4 G/DL (ref 11.9–15.1)
MCH RBC QN AUTO: 28.6 PG (ref 25.2–33.5)
MCHC RBC AUTO-ENTMCNC: 31.9 G/DL (ref 28.4–34.8)
MCV RBC AUTO: 89.5 FL (ref 82.6–102.9)
NRBC AUTOMATED: 0 PER 100 WBC
PDW BLD-RTO: 13.2 % (ref 11.8–14.4)
PLATELET # BLD: 349 K/UL (ref 138–453)
PMV BLD AUTO: 11.4 FL (ref 8.1–13.5)
RBC # BLD: 5.04 M/UL (ref 3.95–5.11)
T3 FREE: 3.9 PG/ML (ref 2.02–4.43)
TSH SERPL DL<=0.05 MIU/L-ACNC: 0.8 MIU/L (ref 0.3–5)
WBC # BLD: 7.4 K/UL (ref 3.5–11.3)

## 2020-10-09 LAB — CYTOLOGY REPORT: NORMAL

## 2020-11-20 ENCOUNTER — HOSPITAL ENCOUNTER (OUTPATIENT)
Age: 25
Discharge: HOME | End: 2020-11-20
Payer: COMMERCIAL

## 2020-11-20 LAB
ABO/RH: NORMAL
ANTIBODY SCREEN: NEGATIVE
BLOOD BANK COMMENT: NORMAL
HCG QUANTITATIVE: 10 IU/L

## 2020-11-20 PROCEDURE — 86901 BLOOD TYPING SEROLOGIC RH(D): CPT

## 2020-11-20 PROCEDURE — 84702 CHORIONIC GONADOTROPIN TEST: CPT

## 2020-11-20 PROCEDURE — 36415 COLL VENOUS BLD VENIPUNCTURE: CPT

## 2020-11-20 PROCEDURE — 86900 BLOOD TYPING SEROLOGIC ABO: CPT

## 2020-11-20 PROCEDURE — 86850 RBC ANTIBODY SCREEN: CPT

## 2020-11-23 ENCOUNTER — TELEPHONE (OUTPATIENT)
Dept: OBGYN CLINIC | Age: 25
End: 2020-11-23

## 2020-11-23 ENCOUNTER — HOSPITAL ENCOUNTER (OUTPATIENT)
Age: 25
Discharge: HOME | End: 2020-11-23
Payer: COMMERCIAL

## 2020-11-23 ENCOUNTER — HOSPITAL ENCOUNTER (OUTPATIENT)
Dept: ULTRASOUND IMAGING | Age: 25
Discharge: HOME | End: 2020-11-25
Payer: COMMERCIAL

## 2020-11-23 LAB — HCG QUANTITATIVE: 2 IU/L

## 2020-11-23 PROCEDURE — 76856 US EXAM PELVIC COMPLETE: CPT

## 2020-11-23 PROCEDURE — 84702 CHORIONIC GONADOTROPIN TEST: CPT

## 2020-11-23 PROCEDURE — 36415 COLL VENOUS BLD VENIPUNCTURE: CPT

## 2020-11-23 PROCEDURE — 76830 TRANSVAGINAL US NON-OB: CPT

## 2020-11-23 NOTE — TELEPHONE ENCOUNTER
Noted thank you we discussed this earlier today but I know she was uncomfortable, so I appreciate you letting her know.

## 2020-11-23 NOTE — TELEPHONE ENCOUNTER
Patient called this am stated she spoke with you this weekend about 2 positive urine results, and 2 negative results. Her HCG on 11/20 was 10, she started bleeding with cramping- her last period was 10/20. I explained this may be a chemical pregnancy \"false\" positive. She was tearful on the phone, stating her cramping is horrible (comes in goes) her bleeding is \"abnormal\" for her periods. I asked if she has taken anything to help she stated no. I informed her to take ibuprofen and or tylenol while I wait for your response. Also informed her if she is having this much acute discomfort, maybe she should present to the ED. She stated she would wait for call back. She is asking what you recommend she do.

## 2020-12-08 ENCOUNTER — TELEMEDICINE (OUTPATIENT)
Dept: PSYCHOLOGY | Age: 25
End: 2020-12-08
Payer: COMMERCIAL

## 2020-12-08 PROCEDURE — 90791 PSYCH DIAGNOSTIC EVALUATION: CPT | Performed by: SOCIAL WORKER

## 2020-12-08 NOTE — PROGRESS NOTES
ADULT BEHAVIORAL HEALTH ASSESSMENT  CHELSEA Sheth  Licensed Independent        Visit Date: 12/8/2020   Time of appointment: 11:17 AM       Time spent with Patient: 40 minutes. This is patient's first appointment. Patient Location: [facility]/Clinician Location: Home office; [facility]   This was a telehealth visit. This virtual visit was conducted via interactive/real time audio and video. Reason for Consult:  Depression     PCP:  Pablo Milton PA-C      Pt provided informed consent for the behavioral health program. Discussed with patient model of service to include the limits of confidentiality (i.e. abuse reporting, suicide intervention, etc.) and short-term intervention focused approach. Pt indicated understanding. Patient provided verbal consent to engage in tele-health psychotherapy. This visit was completed virtually via My Chart due to contact restrictions related to the COVID-19 pandemic. This session was held in a private space. PRESENTING PROBLEM AND HISTORY  Krunal Cobb is a 26 y.o. female who presents for new evaluation and treatment of depression. She has the following symptoms: depressed mood, decreased appetite, decreased sleep, anger/irritability, feeling nervous, anxious, or on edge, racing worry thoughts, excessive worry about a number of events or activities , inability to stop or control worry, unexpected panic attacks, trying hard not to think of a horrible, frightening, or upsetting event, difficulty with attention/concentration, history of trauma and family conflict. Kade Guerrero shared that the past several months have been very challenging to cope with. She reported that she is now addressing complications with her health after recovering from [place]. 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 Onset of symptoms was approximately several months ago. Symptoms have been gradually worsening since that time. She denies current suicidal and homicidal ideation. Family history significant for alcoholism. Risk factors: negative life event patient recently miscarried. Previous treatment includes none. She complains of the following medication side effects: none. MENTAL STATUS EXAM  Mood was dysthymic with sad affect. Suicidal ideation was denied. Homicidal ideation was denied. Hygiene was unable to assess ([facility]). Teofilo Michael  Dress was appropriate. Behavior was Within Normal Limits with No observation of difficulties ambulating. Attitude was Engageable. Eye-contact was good. Speech: rate - WNL, rhythm -  WNL, volume - WNL  Verbalizations were goal directed and coherent. Thought processes were intact and goal-oriented without evidence of delusions, hallucinations, obsessions, or desire; with no cognitive distortions. Associations were characterized by intact cognitive processes. Pt was oriented to person, place, time, and general circumstances;  recent:  good. Insight and judgment were estimated to be good, AEB, a fair  understanding of cyclical maladaptive patterns, and the ability to use insight to inform behavior change. CURRENT MEDICATIONS    Current Outpatient Medications:     azithromycin (ZITHROMAX) 250 MG tablet, Take 2 tabs (500 mg) on Day 1, and take 1 tab (250 mg) on days 2 through 5., Disp: 1 packet, Rfl: 0    ondansetron (ZOFRAN) 4 MG tablet, Take 1 tablet by mouth every 8 hours as needed for Nausea or Vomiting, Disp: 20 tablet, Rfl: 0    busPIRone (BUSPAR) 5 MG tablet, Take 1 tablet by mouth 2 times daily, Disp: 60 tablet, Rfl: 3    albuterol sulfate HFA (PROVENTIL HFA) 108 (90 Base) MCG/ACT inhaler, Inhale 2 puffs into the lungs every 6 hours as needed for Wheezing or Shortness of Breath, Disp: 1 Inhaler, Rfl: 0    Lactobacillus (PROBIOTIC ACIDOPHILUS PO), Take by mouth, Disp: , Rfl:     fluticasone (FLONASE) 50 MCG/ACT nasal spray, 1 spray by Each Nostril route daily, Disp: , Rfl:     Fexofenadine HCl (ALLEGRA ALLERGY PO), Take by mouth, Disp: , Rfl:      FAMILY MEDICAL/MH HISTORY   Her family history includes Alcohol Abuse in her mother; Arthritis in her maternal grandmother; Cancer (age of onset: 61) in her paternal aunt; Diabetes in her father; Heart Disease in her paternal grandfather and paternal grandmother; High Blood Pressure in her paternal grandfather and paternal grandmother; Hypertension in her father; Osteoarthritis in her maternal grandmother; Osteoporosis in her maternal grandmother; Substance Abuse in her mother. PATIENT MENTAL HEALTH HISTORY  No hx of mental health services. PSYCHOSOCIAL HISTORY  Current living situation: Patient lives with  and her pets. 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 Support system: Patient reported her 's parents are supportive. Yarsanism/Spirituality: No current involvement. DRUG AND ALCOHOL CURRENT USE/HISTORY  TOBACCO:  She reports that she has never smoked. She has never used smokeless tobacco.  ALCOHOL:  She reports current alcohol use. OTHER SUBSTANCES: She reports no history of drug use. ASSESSMENT  Camilo Villalobos presented to the appointment today for evaluation and treatment of symptoms of depression. She is currently deemed no risk to herself or others and meets criteria for Adjustment Disorder with depressed mood. Anyssia's symptoms are well controlled at this time. She will also benefit from establishing with an ongoing mental health clinician to address cognitive and behavioral interventions for depressive symptoms. Reynaldo Hernandez was in agreement with recommendations. PHQ Scores 5/27/2020 2/24/2020 6/26/2019 4/28/2019   PHQ2 Score 0 0 0 0   PHQ9 Score 0 0 0 0     Interpretation of Total Score Depression Severity: 1-4 = Minimal depression, 5-9 = Mild depression, 10-14 = Moderate depression, 15-19 = Moderately severe depression, 20-27 = Severe depression    How often pt has had thoughts of death or hurting self (if PHQ positive for depression):       No flowsheet data found. Interpretation of CHRISTY-7 score: 5-9 = mild anxiety, 10-14 = moderate anxiety, 15+ = severe anxiety. Recommend referral to behavioral health for scores 10 or greater. DIAGNOSIS  Reynaldo Hernandez was seen today for depression. Diagnoses and all orders for this visit:    Adjustment disorder with depressed mood        INTERVENTION  Discussed prevalence of  depression for general population, Discussed potential treatments for  depression, Collaborative treatment planning,Clarified role of [facility] in primary care,Recommended that pt establish with a mental health clinician with whom they can meet regularly for psychotherapy services and Reviewed options for identifying appropriate providers.        PLAN Abel Medina was recommended to establish ongoing services with a clinician that can provide ongoing psychotherapy. Discussed availability of various services in the area; such as CBT and trauma specialized counseling including EMDR. INTERACTIVE COMPLEXITY  Is interactive complexity present?   No  Reason:  N/A  Additional Supporting Information:  N/A       Electronically signed by Ania Tate on 12/27/2020 at 9:04 PM

## 2020-12-21 ENCOUNTER — PATIENT MESSAGE (OUTPATIENT)
Dept: OBGYN CLINIC | Age: 25
End: 2020-12-21

## 2020-12-21 ENCOUNTER — HOSPITAL ENCOUNTER (OUTPATIENT)
Age: 25
Discharge: HOME | End: 2020-12-21
Payer: COMMERCIAL

## 2020-12-21 LAB — HCG QUANTITATIVE: 421 IU/L

## 2020-12-21 PROCEDURE — 84702 CHORIONIC GONADOTROPIN TEST: CPT

## 2020-12-21 PROCEDURE — 36415 COLL VENOUS BLD VENIPUNCTURE: CPT

## 2020-12-21 NOTE — TELEPHONE ENCOUNTER
From patient- I also have my stress test scheduled with dr Rabia Mcnally 1/6/2020 should I still do this? And what should I do about take my buspar?  I'm an off on 1/13/2020 so I can do any time that day as well

## 2020-12-21 NOTE — TELEPHONE ENCOUNTER
From: Shanel Dalton  To: TERRENCE Lindsay CNP  Sent: 12/21/2020 3:59 PM EST  Subject: Non-Urgent Medical Question    Okay perfect! I will stay on it. My cardiologist did tell me to cancel my stress test. He did tell me to stop my metoprolol ER 25mg once daily and then check with you if I should be put back on it or not. Thanks for getting back with me!       ----- Message -----   From:TERRENCE Smith CNP   Sent:12/21/2020 3:53 PM EST   To:Cyrusaleksandra Vallejo   Subject:RE: Non-Urgent Medical Question    Veverly Aw on pregnancy!!! In regards to the Buspar it is a category B in pregnancy so if you would like to stay on that I am fine with it. In regards to stress test you would need to talk to your cardiologist about that. See you next month for your initial prenatal visit!!    Sejal Barker CNP       ----- Message -----   Myriam Tavarez   Sent:12/21/2020 10:13 AM EST   To:TERRENCE Hammonds CNP   Subject:RE: Non-Urgent Medical Question    Thank you so much and let me know about my buspar. My cardiologist told me to stop my metoprolol and to check with you if I should continue it or not.      ----- Message -----   From:BENTLEY NIEVES   Sent:12/21/2020 9:42 AM EST   Vernaclaire Howard   Subject:RE: Non-Urgent Medical Question    Order placed      ----- Message -----   Myriam Eye   Sent:12/21/2020 9:34 AM EST   To:TERRENCE Hammonds CNP   Subject:RE: Non-Urgent Medical Question    Yes please and I can get it done today when I am off work.       ----- Message -----   From:BENTLEY NIEVES   Sent:12/21/2020 9:21 AM EST   Vernadine Dock   Subject:RE: Non-Urgent Medical Question    You are all scheduled. Jany Fruits you want me to place an HCG in the system?      ----- Message -----   Myriam Eye   Sent:12/21/2020 8:49 AM EST   To:TERRENCE Hammonds - CNP   Subject:RE: Non-Urgent Medical Question    Okay I'll take it then! ----- Message -----   From:BENTLEY Valdovinos   Sent:12/21/2020 8:46 AM EST   To:Pierre Vallejo   Subject:RE: Non-Urgent Medical Question    I would ask Dr. Matt Peraza if they would still like to preform the stress test, I will send the message to Steven Estrada to see her recommendations on the buspar. Our ultrasound tech is only in on Tuesday and Thursday      ----- Message -----   Viridiana Palmer   Sent:12/21/2020 8:41 AM EST   To:TERRENCE Hammonds CNP   Subject:RE: Non-Urgent Medical Question    I also have my stress test scheduled with dr Aldrich [place] 1/6/2020 should I still do this? And what should I do about take my buspar? I'm an off on 1/13/2020 so I can do any time that day as well       ----- Message -----   From:BENTLEY RANDLE   Sent:12/21/2020 8:23 AM EST   Carla Beasley   Subject:RE: Non-Urgent Medical Question    We will order all the labs at your initial prenatal visit with Steven Estrada, are you available on 1/14/2021? Would you need morning or afternoons? You will be about 7w4d on this day, which would give good images for the ultrasound.       ----- Message -----   Viridiana Palmer   Sent:12/21/2020 8:14 AM EST   To:TERRENCE Hammonds CNP   Subject:RE: Non-Urgent Medical Question    My first day of my last period was 11/20/2020. Do I need to get labs done?       ----- Message -----   From:BENTLEY RANDLE   Sent:12/21/2020 8:12 AM EST   To:Pierre Vallejo   Subject:RE: Non-Urgent Medical Question    Congratulations! When was the first day of your last period? We like to bring you in around 7-9 weeks of pregnancy with an ultrasound and appointment on the same day here in office. ----- Message -----   Viridiana Palmer   Sent:12/21/2020 6:49 AM EST   To:Almaz Matuszewski, APRN - CNP   Subject:Non-Urgent Medical Question    Good morning Almaz,  My period this month is 4 days late and I did take 2 pregnancy tests this morning and they both were positive.  What would you like to do from here?      Thanks,    Zen Smith

## 2020-12-21 NOTE — TELEPHONE ENCOUNTER
From: Shanel Dalton  To: Sejal Barker, TERRENCE - CNP  Sent: 12/21/2020 6:49 AM EST  Subject: Non-Urgent Medical Question    Good morning Almaz,  My period this month is 4 days late and I did take 2 pregnancy tests this morning and they both were positive. What would you like to do from here?      Thanks,    Samira Wilson

## 2021-01-14 ENCOUNTER — HOSPITAL ENCOUNTER (OUTPATIENT)
Age: 26
Setting detail: SPECIMEN
Discharge: HOME | End: 2021-01-14
Payer: COMMERCIAL

## 2021-01-14 ENCOUNTER — INITIAL PRENATAL (OUTPATIENT)
Dept: OBGYN CLINIC | Age: 26
End: 2021-01-14

## 2021-01-14 VITALS — BODY MASS INDEX: 25.33 KG/M2 | DIASTOLIC BLOOD PRESSURE: 62 MMHG | WEIGHT: 134 LBS | SYSTOLIC BLOOD PRESSURE: 110 MMHG

## 2021-01-14 DIAGNOSIS — Z3A.01 7 WEEKS GESTATION OF PREGNANCY: ICD-10-CM

## 2021-01-14 DIAGNOSIS — Z34.91 NORMAL PREGNANCY IN FIRST TRIMESTER: Primary | ICD-10-CM

## 2021-01-14 DIAGNOSIS — Z34.91 NORMAL PREGNANCY IN FIRST TRIMESTER: ICD-10-CM

## 2021-01-14 LAB
ABO/RH: NORMAL
ABSOLUTE EOS #: <0.03 K/UL (ref 0–0.44)
ABSOLUTE IMMATURE GRANULOCYTE: 0.03 K/UL (ref 0–0.3)
ABSOLUTE LYMPH #: 1.88 K/UL (ref 1.1–3.7)
ABSOLUTE MONO #: 0.57 K/UL (ref 0.1–1.2)
AMPHETAMINE SCREEN URINE: NEGATIVE
ANTIBODY SCREEN: NEGATIVE
BARBITURATE SCREEN URINE: NEGATIVE
BASOPHILS # BLD: 0 % (ref 0–2)
BASOPHILS ABSOLUTE: 0.04 K/UL (ref 0–0.2)
BENZODIAZEPINE SCREEN, URINE: NEGATIVE
BILIRUBIN URINE: NEGATIVE
BUPRENORPHINE URINE: NORMAL
CANNABINOID SCREEN URINE: NEGATIVE
COCAINE METABOLITE, URINE: NEGATIVE
COLOR: YELLOW
COMMENT UA: NORMAL
DIFFERENTIAL TYPE: ABNORMAL
EOSINOPHILS RELATIVE PERCENT: 0 % (ref 1–4)
GLUCOSE URINE: NEGATIVE
HCT VFR BLD CALC: 43.5 % (ref 36.3–47.1)
HEMOGLOBIN: 14.4 G/DL (ref 11.9–15.1)
HEPATITIS B SURFACE ANTIGEN: NONREACTIVE
HIV AG/AB: NONREACTIVE
IMMATURE GRANULOCYTES: 0 %
KETONES, URINE: NEGATIVE
LEUKOCYTE ESTERASE, URINE: NEGATIVE
LYMPHOCYTES # BLD: 19 % (ref 24–43)
MCH RBC QN AUTO: 28.9 PG (ref 25.2–33.5)
MCHC RBC AUTO-ENTMCNC: 33.1 G/DL (ref 28.4–34.8)
MCV RBC AUTO: 87.2 FL (ref 82.6–102.9)
MDMA URINE: NORMAL
METHADONE SCREEN, URINE: NEGATIVE
METHAMPHETAMINE, URINE: NORMAL
MONOCYTES # BLD: 6 % (ref 3–12)
NITRITE, URINE: NEGATIVE
NRBC AUTOMATED: 0 PER 100 WBC
OPIATES, URINE: NEGATIVE
OXYCODONE SCREEN URINE: NEGATIVE
PDW BLD-RTO: 14.1 % (ref 11.8–14.4)
PH UA: 7 (ref 5–8)
PHENCYCLIDINE, URINE: NEGATIVE
PLATELET # BLD: 386 K/UL (ref 138–453)
PLATELET ESTIMATE: ABNORMAL
PMV BLD AUTO: 10.7 FL (ref 8.1–13.5)
PROPOXYPHENE, URINE: NORMAL
PROTEIN UA: NEGATIVE
RBC # BLD: 4.99 M/UL (ref 3.95–5.11)
RBC # BLD: ABNORMAL 10*6/UL
RUBV IGG SER QL: 148.4 IU/ML
SEG NEUTROPHILS: 75 % (ref 36–65)
SEGMENTED NEUTROPHILS ABSOLUTE COUNT: 7.43 K/UL (ref 1.5–8.1)
SPECIFIC GRAVITY UA: 1 (ref 1–1.03)
T. PALLIDUM, IGG: NONREACTIVE
TEST INFORMATION: NORMAL
TRICYCLIC ANTIDEPRESSANTS, UR: NORMAL
TURBIDITY: CLEAR
URINE HGB: NEGATIVE
UROBILINOGEN, URINE: NORMAL
WBC # BLD: 10 K/UL (ref 3.5–11.3)
WBC # BLD: ABNORMAL 10*3/UL

## 2021-01-14 PROCEDURE — 0500F INITIAL PRENATAL CARE VISIT: CPT | Performed by: NURSE PRACTITIONER

## 2021-01-14 NOTE — PROGRESS NOTES
OB History    Para Term  AB Living   1 0 0 0 0 0   SAB TAB Ectopic Molar Multiple Live Births   0 0 0   0        # Outcome Date GA Lbr Von/2nd Weight Sex Delivery Anes PTL Lv   1 Current               Anyssia Anahi Hobson is being seen today for her new obstetrical visit. Her last period was Patient's last menstrual period was 2020 (exact date). .  This was a sure and definite menses. She was not on OCP at conception. 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 Mima Morton , she has been following cardiology since having [address] in  and having SOB and palpitations. History of prior GBS-infected child:  NA  Recent travel outside of country (partner also):  no    Known COVID/Zika exposure (partner also): she has had COVID in 2020  Any history of genetic or chromosomal aberations in herself the father to be or their respective families: yes - FOB has 2nd cousin with Trisomy 24. , She also has a distant cousin on paternal side with Trisomy 24. Any histories of spina bifida or abdominal wall defects; omphalocele's or gastroschisis no  Hx Hypothyroidism: denies  Hx preeclampsia or HTN:  denies  Hx PPD: n/a, hx anxiety on buspar doing well on it.    Hx Diabetes: denies  Hx GDM: n/a  First degree relative with diabetes: denies           Allergies   Allergen Reactions    Amoxil [Amoxicillin] Swelling     Reported throat swelling      Current Outpatient Medications   Medication Sig Dispense Refill  Prenatal Vit-Fe Fumarate-FA (PRENATAL VITAMIN PO) Take by mouth      busPIRone (BUSPAR) 5 MG tablet Take 1 tablet by mouth 2 times daily 180 tablet 2    albuterol sulfate HFA (PROVENTIL HFA) 108 (90 Base) MCG/ACT inhaler Inhale 2 puffs into the lungs every 6 hours as needed for Wheezing or Shortness of Breath 1 Inhaler 0    Lactobacillus (PROBIOTIC ACIDOPHILUS PO) Take by mouth      fluticasone (FLONASE) 50 MCG/ACT nasal spray 1 spray by Each Nostril route daily      Fexofenadine HCl (ALLEGRA ALLERGY PO) Take by mouth       No current facility-administered medications for this visit. Past Medical History:   Diagnosis Date    Dysmenorrhea     Menorrhagia      Past Surgical History:   Procedure Laterality Date    ENDOMETRIAL BIOPSY  5/5/16    INTRAUTERINE DEVICE INSERTION  5/5/16     [facility]    INTRAUTERINE DEVICE REMOVAL  03/14/2017         Swelling: no  Bleeding: no  Discharge: no   Dysuria: no  Nausea: yes -  encouraged small frequent meals, and vitamin B6 and unisom if needed. Heartburn: no  Epigastric pain: no       Office protocol reviewed, After hours number provided. Diet and exercise reveiwed  Warning signs reviewed  Testing reviewed     Q&A  Refer yes - [facility]  for 1st trimester screen/cfDNA, info provided for screening, Discussed dates and orders for Anatomy USd and fetal echo if indicated. Breastfeeding education. She verbally consented to HIV testing and drug screening. She was counseled on Toxoplasmosis/Listeriosis (cats/raw meat). Prenatal Vitamins recommended. Prenatal labs and dating us ordered. RV prn/ 4 weeks     Of the 30 minute duration appointment visit, Guillermo Haque CNP spent at least 50% of the face-to-face time in counseling, explanation of diagnosis, planning of further management, and answering all questions.

## 2021-01-14 NOTE — PATIENT INSTRUCTIONS
After Hours Number: You can call the office [phone]  or [phone]  Call if you have:  1. Bleeding like a period  2. Cramps or contractions greater than 2 hours  3. If you are leaking fluid  4. If you've a fever greater than 100°  5. If you feel as if baby is not moving  6. If you have continuous vomiting over 3-4 hours        Patient was counseled on weight gain in pregnancy with the following recommendation made based on her BMI:     Singletons:  BMI <18.5: 28-40 lbs weight gain  BMI 18.5-24.9: 25-35 lbs weight gain   BMI 25-29.9: 15-25 lbs weight gain  BMI >/= 30: 11-20 lbs weight gain    Up to 16 weeks around 1 pound a month  16-28 weeks- 2-4 pounds a month  >28 weeks - around 1 pound a week due to increased growth and blood volume      Twins:  BMI <18.5: no reccomendations  BMI 18.5-24.9: 37-45 lbs weight gain  BMI 25-29.9: 31-50 lbs weight gain  BMI >/= 30: 25-42 lbs weight gain    During Pregnancy: Exercises  Your Care Instructions  Here are some examples of exercises to do during your pregnancy. Start each exercise slowly. Ease off the exercise if you start to have pain. Your doctor or physical therapist will tell you when you can start these exercises and which ones will work best for you. How to do the exercises  Neck rotation    1. Sit in a firm chair, or stand up straight. 2. Keeping your chin level, turn your head to the right, and hold for 15 to 30 seconds. 3. Turn your head to the left and hold for 15 to 30 seconds. 4. Repeat 2 to 4 times to each side. Forward neck flexion    1. Sit in a firm chair, or stand up straight. 2. Bend your head forward. 3. Hold for 15 to 30 seconds. 4. Repeat 2 to 4 times. Back press    1. Place your feet 10 to 12 inches from the wall. 2. Rest your back flat against the wall and slide down the wall until your knees are slightly bent. 3. Press your lower back against the wall by pulling in your stomach muscles. 4. Hold for 6 seconds, and then relax your stomach muscles and slide back up the wall. 5. Repeat 8 to 12 times. Full body twist    1. Sit with your legs crossed. 2. Reach your left hand toward your right foot, and place your right hand at your side for support. 3. Slowly twist your torso to your right. 4. Switch your hands and twist to your left. 5. Repeat 2 to 4 times. Pelvic rocking    1. Kneeling on hands and knees, place your hands directly under your shoulders and your knees under your hips. 2. Breathe in deeply. Tuck your head downward and round your back up, making a curve with your back in the shape of the letter C. Hold this position for a count of 6.  3. Breathe out slowly and bring your head back up. Relax, keeping your back straight (don't allow it to curve toward the floor). Hold this for a count of 6.  4. Do this exercise 8 times or to your comfort level. Pelvic tilt    1. Lie on your back. 2. Keep your knees relaxed. 3. Tighten your belly and buttocks muscles. 4. At the same time, gently shift your pelvis upward. This should flatten the curve in your back. 5. Hold for 6 seconds and then relax. 6. Gradually increase the number of tilts you do each day, to your comfort level. Backward stretch    1. Kneel on hands and knees with your knees 8 to 10 inches apart, hands directly under your shoulders, and arms and back straight. 2. Keeping your arms straight, slowly lower your buttocks toward your heels and tuck your head toward your knees. Hold for 15 to 30 seconds. 3. Slowly return to the kneeling position. 4. Repeat 2 to 4 times. Forward bend    1. Sit comfortably in a chair, with your arms relaxed. 2. Slowly bend forward, allowing your arms to hang down in front of you. Lean only as far as you can without feeling discomfort or pressure on your belly. 3. Hold for 15 to 30 seconds and then slowly sit up straight. 4. Repeat 2 to 4 times or to your comfort level. Leg lift crawl    1. Kneeling on hands and knees, place your hands directly under your shoulders and straighten your arms. 2. Tighten your belly muscles by pulling in your belly button toward your spine. Be sure you continue to breathe normally and do not hold your breath. 3. Lift your left knee and bring it toward your elbow. 4. Slowly extend your leg behind you without completely straightening it. Be careful not to let your hip drop down. Avoid arching your back. 5. Hold your leg behind you for about 6 seconds. 6. Return to your starting position. 7. Do the same exercise with your other leg. 8. Repeat 8 to 12 times for each leg. Tailor sitting    1. Sit on the floor. 2. Bring your feet close to your body while crossing your ankles. 3. Hold this position for as long as you are comfortable. Tailor stretching    1. Sit on the floor with your back straight, legs about 12 inches apart, and feet relaxed outward. 2. Stretch your hands forward toward your left foot, then sit up. 3. Stretch your hands straight forward, then sit up. 4. Stretch your hands forward toward your right foot, then sit up. 5. Hold each stretch for 15 to 30 seconds. 6. Repeat 2 to 4 times. Follow-up care is a key part of your treatment and safety. Be sure to make and go to all appointments, and call your doctor if you are having problems. It's also a good idea to know your test results and keep a list of the medicines you take. Where can you learn more? Go to https://marquis.Muzico International. org and sign in to your Lob account. Enter F894 in the Coordi-Careâ€™s box to learn more about \"During Pregnancy: Exercises. \"     If you do not have an account, please click on the \"Sign Up Now\" link.   Current as of: September 5, 2018  Content Version: 12.0 © 7435-9189 Healthwise, Incorporated. Care instructions adapted under license by [facility] ([facility]). If you have questions about a medical condition or this instruction, always ask your healthcare professional. [address] any warranty or liability for your use of this information. Nutrition During Pregnancy: Care Instructions  Your Care Instructions    Healthy eating when you are pregnant is important for you and your baby. It can help you feel well and have a successful pregnancy and delivery. During pregnancy your nutrition needs increase. Even if you have excellent eating habits, your doctor may recommend a multivitamin to make sure you get enough iron and folic acid. Many pregnant women wonder how much weight they should gain. In general, women who were at a healthy weight before they became pregnant should gain between 25 and 35 pounds. Women who were overweight before pregnancy are usually advised to gain 15 to 25 pounds. Women who were underweight before pregnancy are usually advised to gain 28 to 40 pounds. Your doctor will work with you to set a weight goal that is right for you. Gaining a healthy amount of weight helps you have a healthy baby. Follow-up care is a key part of your treatment and safety. Be sure to make and go to all appointments, and call your doctor if you are having problems. It's also a good idea to know your test results and keep a list of the medicines you take. How can you care for yourself at home? · Eat plenty of fruits and vegetables. Include a variety of orange, yellow, and leafy dark-green vegetables every day. · Choose whole-grain bread, cereal, and pasta.  Good choices include whole wheat bread, whole wheat pasta, brown rice, and oatmeal. · Get 4 or more servings of milk and milk products each day. Good choices include nonfat or low-fat milk, yogurt, and cheese. If you cannot eat milk products, you can get calcium from calcium-fortified products such as orange juice, soy milk, and tofu. Other non-milk sources of calcium include leafy green vegetables, such as broccoli, kale, mustard greens, turnip greens, bok fabiana, and brussels sprouts. · If you eat meat, pick lower-fat types. Good choices include lean cuts of meat and chicken or turkey without the skin. · Do not eat shark, swordfish, amita mackerel, or tilefish. They have high levels of mercury, which is dangerous to your baby. You can eat up to 12 ounces a week of fish or shellfish that have low mercury levels. Good choices include shrimp, wild salmon, pollock, and catfish. Do not eat more than 6 ounces of tuna each week. · Heat lunch meats (such as turkey, ham, or bologna) to 165°F before you eat them. This reduces your risk of getting sick from a kind of bacteria that can be found in lunch meats. · Do not eat unpasteurized soft cheeses, such as brie, feta, fresh mozzarella, and blue cheese. They have a bacteria that could harm your baby. · Limit caffeine. If you drink coffee or tea, have no more than 1 cup a day. Caffeine is also found in barb. · Do not drink any alcohol. No amount of alcohol has been found to be safe during pregnancy. · Do not diet or try to lose weight. For example, do not follow a low-carbohydrate diet. If you are overweight at the start of your pregnancy, your doctor will work with you to manage your weight gain. · Tell your doctor about all vitamins and supplements you take. When should you call for help? Watch closely for changes in your health, and be sure to contact your doctor if you have any problems. Where can you learn more? Go to https://chpepiceweb.healthGroopie. org and sign in to your [employer] account. Enter Y785 in the [facility] box to learn more about \"Nutrition During Pregnancy: Care Instructions. \"     If you do not have an account, please click on the \"Sign Up Now\" link. Current as of: September 5, 2018  Content Version: 12.0  © 1407-8083 MyCadbox. Care instructions adapted under license by [facility] ([facility]). If you have questions about a medical condition or this instruction, always ask your healthcare professional. [place] [address] any warranty or liability for your use of this information. Managing Morning Sickness: Care Instructions  Your Care Instructions    For many women, the toughest part of early pregnancy is morning sickness. Morning sickness can range from mild nausea to severe nausea with bouts of vomiting. Symptoms may be worse in the morning, although they can strike at any time of the day or night. If you have nausea, vomiting, or both, look for safe measures that can bring you relief. You can take simple steps at home to manage morning sickness. These steps include changing what and when you eat and avoiding certain foods and smells. Some women find that acupuncture and acupressure wristbands also help. Follow-up care is a key part of your treatment and safety. Be sure to make and go to all appointments, and call your doctor if you are having problems. It's also a good idea to know your test results and keep a list of the medicines you take. How can you care for yourself at home? · Keep food in your stomach, but not too much at once. Your nausea may be worse if your stomach is empty. Eat five or six small meals a day instead of three large meals. · For morning nausea, eat a small snack, such as a couple of crackers or dry biscuits, before rising. Allow a few minutes for your stomach to settle before you get out of bed slowly. Go to https://chpepiceweb.healthFirst Data Corporation. org and sign in to your Emerging [employer] account. Enter F842 in the [facility] box to learn more about \"Managing Morning Sickness: Care Instructions. \"     If you do not have an account, please click on the \"Sign Up Now\" link. Current as of: September 5, 2018  Content Version: 12.0  © 6197-0849 Piano Media. Care instructions adapted under license by [facility] ([facility] If you have questions about a medical condition or this instruction, always ask your healthcare professional. [place] [address] any warranty or liability for your use of this information. Breastfeeding: Care Instructions  Overview      Breastfeeding has many benefits. It may lower your baby's chances of getting an infection. It also may make it less likely that your baby will have problems such as diabetes and obesity later in life. Breastfeeding also helps you bond with your baby. In the first days after birth, your breasts make a thick, yellow liquid called colostrum. This liquid gives your baby nutrients and antibodies against infection. It is all that babies need in the first days after birth. Your breasts will fill with milk a few days after the birth. Breastfeeding is a skill that gets better with practice. Be patient with yourself and your baby. If you have trouble, you can get help and keep breastfeeding. Follow-up care is a key part of your treatment and safety. Be sure to make and go to all appointments, and call your doctor if you are having problems. It's also a good idea to know your test results and keep a list of the medicines you take. How can you care for yourself at home? · Breastfeed your baby whenever he or she is hungry. In the first 2 weeks, your baby will feed about every 1 to 3 hours. That often works out to about 8 to 12 times in a 24-hour period. This will help you keep up your supply of milk.  Signs that your baby is hungry include: ? Sucking on his or her hands. ? Kendall his or her lips. ? Turning his or her head toward your breast.  · Put a bed pillow or a nursing pillow on your lap to support your arms and your baby. · Hold your baby in a comfortable position. ? You can hold your baby in several ways. One of the most common positions is the cradle hold. One arm supports your baby, with his or her head in the bend of your elbow. Your open hand supports your baby's bottom or back. Your baby's belly lies against yours. ? If you had your baby by , or , try the football hold. This position keeps your baby off your belly. Tuck your baby under your arm, with his or her body along the side you will be feeding on. Support your baby's upper body with your arm. With that hand you can control your baby's head to bring his or her mouth to your breast.  ? Try different positions with each feeding. If you are having problems, ask for help from your doctor or a lactation consultant. · To get your baby to latch on:  ? Support and narrow your breast with one hand using a \"U hold,\" with your thumb on the outer side of your breast and your fingers on the inner side. You can also use a \"C hold,\" with all your fingers below the nipple and your thumb above it. Try the different holds to get the deepest latch for whichever breastfeeding position you use. Your other arm is behind your baby's back, with your hand supporting the base of the baby's head. Position your fingers and thumb to point toward your baby's ears. ? You can touch your baby's lower lip with your nipple to get your baby to open his or her mouth. Wait until your baby opens up really wide, like a big yawn. Then be sure to bring the baby quickly to your breastnot your breast to the baby. As you bring your baby toward your breast, use your other hand to support the breast and guide it into his or her mouth. ? Both the nipple and a large portion of the darker area around the nipple (areola) should be in the baby's mouth. The baby's lips should be flared outward, not folded in (inverted). ? Listen for a regular sucking and swallowing pattern while the baby is feeding. If you cannot see or hear a swallowing pattern, watch the baby's ears, which will wiggle slightly when the baby swallows. If the baby's nose appears to be blocked by your breast, bring your baby's body closer to you. This will help tilt the baby's head back slightly, so just the edge of one nostril is clear for breathing. ? When your baby is latched, you can usually remove your hand from supporting your breast and bring it under your baby to cradle him or her. Now just relax and breastfeed your baby. · You will know that your baby is feeding well when:  ? His or her mouth covers a lot of the areola, and the lips are flared out.  ? His or her chin and nose rest against your breast.  ? Sucking is deep and rhythmic, with short pauses. ? You are able to see and hear your baby swallowing. ? You do not feel pain in your nipple. · Offer both breasts to your baby at each feeding. Each time you breastfeed, switch which breast you start with. · Anytime you need to remove your baby from the breast, put one finger in the corner of his or her mouth. Push your finger between your baby's gums to gently break the seal. If you do not break the tight seal before you remove your baby, your nipples can become sore, cracked, or bruised. · After feeding your baby, gently pat his or her back to let out any swallowed air. After your baby burps, offer the breast again, or offer the other breast. Sometimes a baby will want to keep feeding after being burped. When should you call for help?   Call your doctor now or seek immediate medical care if:    · You have symptoms of a breast infection, such as:  ? Increased pain, swelling, redness, or warmth around a breast. ? Red streaks extending from the breast.  ? Pus draining from a breast.  ? A fever. · Your baby has no wet diapers for 6 hours. Watch closely for changes in your health, and be sure to contact your doctor if:    · Your baby has trouble latching on to your breast.     · You continue to have pain or discomfort when breastfeeding. · You have other questions or concerns. Where can you learn more? Go to https://Clinithinkpepiceweb.Ini3 Digital. org and sign in to your [email or web address] account. Enter P492 in the [employer]View Systems box to learn more about \"Breastfeeding: Care Instructions. \"     If you do not have an account, please click on the \"Sign Up Now\" link. Current as of: September 5, 2018  Content Version: 12.0  © 8629-4174 BaubleBar. Care instructions adapted under license by [facility] ([facility]). If you have questions about a medical condition or this instruction, always ask your healthcare professional. [place] [address] any warranty or liability for your use of this information. Learning About Birth Defects Testing  What is birth defects testing? Birth defects testing is done during pregnancy to look for possible problems with the baby (fetus). A birth defect may have only a minor impact on a child's life. Or it can have a major effect on quality or length of life. You and your doctor can choose from many tests. You may have no tests, one test, or many tests. Talking with a genetic counselor may help you make decisions about testing. The counselor is trained to help you understand these tests. He or she can also help you find resources for support. What are the types of tests? You may have a screening test or a diagnostic test. Or you may have both types of tests. Screening tests show the chance that a baby has a certain birth defect, such as Down syndrome, spina bifida, or trisomy 25. Diagnostic tests show if a baby has a certain birth defect. · Screening tests and when they are done:  ? Blood tests at 10 to 13 weeks (first trimester)  ? Cell free fetal DNA test at 10 weeks or later (first trimester)  ? Nuchal translucency test at 11 to 14 weeks (first trimester)  ? Triple or quad screening at 15 to 20 weeks (second trimester)  ? Ultrasound at 18 to 20 weeks (second trimester)  · Diagnostic tests and when they are done:  ? Chorionic villus sampling (CVS) at 10 to 13 weeks (first trimester)  ? Amniocentesis at 13 to 20 weeks (second trimester)  In some cases, the doctors look at the combined screenings that you've had over a period of time. This is called an integrated screening. What are the screening tests? · Blood tests are done to look at different substances in your blood. These tests include cell free fetal DNA and triple or quadruple (quad) blood tests. Both of these tests can help find genetic problems. · Nuchal translucency test uses ultrasound to measure the thickness of the area at the back of the baby's neck. An increase in thickness can be an early sign of certain birth defects. Ultrasound is a tool that uses sound waves to make pictures of your baby and placenta inside the uterus. · Ultrasound lets your doctor see an image of your baby. It can help your doctor look for problems of the heart, spine, belly, or other areas. What are the diagnostic tests? Chorionic villus sampling (CVS) looks at cells from the placenta. To do the test, your doctor may put a thin tube through your vagina and cervix to take out a small piece of the placenta. Or the doctor may take out the piece through a needle in your belly. This test can diagnose many genetic diseases. But it can't find problems with the spinal cord. Amniocentesis looks at the amniotic fluid that surrounds your baby. Your doctor will put a needle through your belly into your uterus and take out a very small amount of fluid to test.  What are the risks of these tests? There is a small risk of a miscarriage after a CVS or amniocentesis. Your doctor or genetic counselor can help you understand this risk. These tests are generally very safe. Where can you learn more? Go to https://DieDe Die DevelopmentpeGenesco.Walvax Biotechnology. org and sign in to your [employer] account. Enter G030 in the ENT Surgical box to learn more about \"Learning About Birth Defects Testing. \"     If you do not have an account, please click on the \"Sign Up Now\" link. Current as of: September 5, 2018  Content Version: 12.0  © 5590-2742 Healthwise, Incorporated. Care instructions adapted under license by [facility] ([facility]). If you have questions about a medical condition or this instruction, always ask your healthcare professional. [address] any warranty or liability for your use of this information.

## 2021-01-15 LAB
C. TRACHOMATIS DNA ,URINE: NEGATIVE
CULTURE: NORMAL
Lab: NORMAL
N. GONORRHOEAE DNA, URINE: NEGATIVE
SPECIMEN DESCRIPTION: NORMAL
SPECIMEN DESCRIPTION: NORMAL

## 2021-01-18 LAB — CYSTIC FIBROSIS: NORMAL

## 2021-01-25 ENCOUNTER — PATIENT MESSAGE (OUTPATIENT)
Dept: OBGYN CLINIC | Age: 26
End: 2021-01-25

## 2021-01-25 RX ORDER — ONDANSETRON 4 MG/1
4 TABLET, ORALLY DISINTEGRATING ORAL EVERY 8 HOURS PRN
Qty: 30 TABLET | Refills: 1 | Status: SHIPPED
Start: 2021-01-25 | End: 2021-04-14

## 2021-02-10 ENCOUNTER — ROUTINE PRENATAL (OUTPATIENT)
Dept: OBGYN CLINIC | Age: 26
End: 2021-02-10

## 2021-02-10 VITALS — SYSTOLIC BLOOD PRESSURE: 146 MMHG | WEIGHT: 136 LBS | DIASTOLIC BLOOD PRESSURE: 82 MMHG | BODY MASS INDEX: 25.71 KG/M2

## 2021-02-10 DIAGNOSIS — Z3A.11 11 WEEKS GESTATION OF PREGNANCY: ICD-10-CM

## 2021-02-10 DIAGNOSIS — I10 CHRONIC HYPERTENSION: ICD-10-CM

## 2021-02-10 DIAGNOSIS — Z34.91 NORMAL PREGNANCY IN FIRST TRIMESTER: Primary | ICD-10-CM

## 2021-02-10 PROCEDURE — 0502F SUBSEQUENT PRENATAL CARE: CPT | Performed by: OBSTETRICS & GYNECOLOGY

## 2021-02-10 RX ORDER — LABETALOL 200 MG/1
200 TABLET, FILM COATED ORAL 2 TIMES DAILY
Qty: 60 TABLET | Refills: 2 | Status: SHIPPED | OUTPATIENT
Start: 2021-02-10 | End: 2021-02-11 | Stop reason: SDUPTHER

## 2021-02-10 NOTE — LETTER
[facility] [address]  Phone: [phone]  Fax: [phone], [phone] [facility],         February 10, 2021     Patient: Familia Larsen   YOB: 1995   Date of Visit: 2/10/2021       To Whom It May Concern: It is my medical opinion that Familia Larsen is able to use her FMLA paperwork for appointments in our office for the remainder of her pregnancy. If you have any questions or concerns, please don't hesitate to call.     Sincerely,        Magaly Rhodes, DO

## 2021-02-10 NOTE — PROGRESS NOTES
Kalia Stanford is a 26 y.o. female 11w5d        OB History    Para Term  AB Living   1 0 0 0 0 0   SAB TAB Ectopic Molar Multiple Live Births   0 0 0   0        # Outcome Date GA Lbr Von/2nd Weight Sex Delivery Anes PTL Lv   1 Current                      Vitals  BP: (!) 146/82  Weight: 136 lb (61.7 kg)  Patient Position: Sitting      The patient was seen and evaluated. There was Positive fetal movements on ultrasound. No contractions or leakage of fluid. Signs and symptoms of  labor as well as labor were reviewed. The Nuchal Translucency testing was reviewed and found to be referral placed. A single marker MSAFP was discussed for a 15-20 week gestational age window. TOP ST OH Reviewed. Dates were reviewed with the patient. An 18-22 week anatomy ultrasound has been ordered. The patient will return to the office for her next visit in 4 weeks. See antepartum flow sheet. First trimester screen placed- declined, did not have enough PTO to make appt  G1  Needs FMLA for intermittent leave for appts   Chronic Hypertension - restarted labetalol 2/10/21                  Assessment:  1. Kalia Stanford is a 26 y.o. female  2.   3. 11w5d    Patient Active Problem List    Diagnosis Date Noted    Sinus tachycardia 08/10/2020     Priority: High    Atypical chest pain 08/10/2020     Priority: High    Essential hypertension 02/10/2021    COVID-19 08/10/2020    Anxiety 08/10/2020    Dysmenorrhea 2012    Menorrhagia 2012        Diagnosis Orders   1. Normal pregnancy in first trimester  Ambulatory referral to Maternal Fetal   2. 11 weeks gestation of pregnancy  Uric Acid    Protein, 24 Hr Urine    Lactate Dehydrogenase    Comprehensive Metabolic Panel    CBC Auto Differential    EKG 12 lead    Protein / Creatinine Ratio, Urine    Ambulatory referral to Maternal Fetal   3.  Chronic hypertension  Uric Acid    Protein, 24 Hr Urine    Lactate Dehydrogenase    Comprehensive Metabolic Panel    CBC Auto Differential    EKG 12 lead    Protein / Creatinine Ratio, Urine    Ambulatory referral to Maternal Fetal         Plan:  RTO 4 weeks  Elevated BP, restart antihypertensive Labetalol 200mg BID  Labs, EKG, 24hr urine ordered          Patient was seen with total face to face time of 20 minutes. More than 50% of this visit was on counseling and education regarding her    Diagnosis Orders   1. Normal pregnancy in first trimester  Ambulatory referral to Maternal Fetal   2. 11 weeks gestation of pregnancy  Uric Acid    Protein, 24 Hr Urine    Lactate Dehydrogenase    Comprehensive Metabolic Panel    CBC Auto Differential    EKG 12 lead    Protein / Creatinine Ratio, Urine    Ambulatory referral to Maternal Fetal   3. Chronic hypertension  Uric Acid    Protein, 24 Hr Urine    Lactate Dehydrogenase    Comprehensive Metabolic Panel    CBC Auto Differential    EKG 12 lead    Protein / Creatinine Ratio, Urine    Ambulatory referral to Maternal Fetal    and her options. She was also counseled on her preventative health maintenance recommendations and follow-up.

## 2021-02-11 ENCOUNTER — HOSPITAL ENCOUNTER (EMERGENCY)
Age: 26
Discharge: HOME | End: 2021-02-11
Attending: EMERGENCY MEDICINE
Payer: COMMERCIAL

## 2021-02-11 ENCOUNTER — TELEPHONE (OUTPATIENT)
Dept: OBGYN CLINIC | Age: 26
End: 2021-02-11

## 2021-02-11 VITALS
SYSTOLIC BLOOD PRESSURE: 113 MMHG | RESPIRATION RATE: 14 BRPM | TEMPERATURE: 98.4 F | HEART RATE: 88 BPM | BODY MASS INDEX: 25.71 KG/M2 | OXYGEN SATURATION: 99 % | DIASTOLIC BLOOD PRESSURE: 82 MMHG | WEIGHT: 136 LBS

## 2021-02-11 DIAGNOSIS — T50.905A MEDICATION SIDE EFFECT, INITIAL ENCOUNTER: ICD-10-CM

## 2021-02-11 DIAGNOSIS — O21.0 MILD HYPEREMESIS GRAVIDARUM, ANTEPARTUM: Primary | ICD-10-CM

## 2021-02-11 LAB
ABSOLUTE EOS #: 0 K/UL (ref 0–0.4)
ABSOLUTE IMMATURE GRANULOCYTE: ABNORMAL K/UL (ref 0–0.3)
ABSOLUTE LYMPH #: 1.6 K/UL (ref 1–4.8)
ABSOLUTE MONO #: 0.6 K/UL (ref 0.1–1.3)
ALBUMIN SERPL-MCNC: 3.9 G/DL (ref 3.5–5.2)
ALBUMIN/GLOBULIN RATIO: ABNORMAL (ref 1–2.5)
ALP BLD-CCNC: 69 U/L (ref 35–104)
ALT SERPL-CCNC: 14 U/L (ref 5–33)
ANION GAP SERPL CALCULATED.3IONS-SCNC: 9 MMOL/L (ref 9–17)
AST SERPL-CCNC: 12 U/L
BASOPHILS # BLD: 0 % (ref 0–2)
BASOPHILS ABSOLUTE: 0 K/UL (ref 0–0.2)
BILIRUB SERPL-MCNC: 0.21 MG/DL (ref 0.3–1.2)
BILIRUBIN URINE: NEGATIVE
BUN BLDV-MCNC: 8 MG/DL (ref 6–20)
BUN/CREAT BLD: ABNORMAL (ref 9–20)
CALCIUM SERPL-MCNC: 9.2 MG/DL (ref 8.6–10.4)
CHLORIDE BLD-SCNC: 102 MMOL/L (ref 98–107)
CO2: 22 MMOL/L (ref 20–31)
COLOR: YELLOW
COMMENT UA: NORMAL
CREAT SERPL-MCNC: 0.49 MG/DL (ref 0.5–0.9)
DIFFERENTIAL TYPE: ABNORMAL
EOSINOPHILS RELATIVE PERCENT: 0 % (ref 0–4)
GFR AFRICAN AMERICAN: >60 ML/MIN
GFR NON-AFRICAN AMERICAN: >60 ML/MIN
GFR SERPL CREATININE-BSD FRML MDRD: ABNORMAL ML/MIN/{1.73_M2}
GFR SERPL CREATININE-BSD FRML MDRD: ABNORMAL ML/MIN/{1.73_M2}
GLUCOSE BLD-MCNC: 102 MG/DL (ref 70–99)
GLUCOSE URINE: NEGATIVE
HCT VFR BLD CALC: 39.6 % (ref 36–46)
HEMOGLOBIN: 13.4 G/DL (ref 12–16)
IMMATURE GRANULOCYTES: ABNORMAL %
KETONES, URINE: NEGATIVE
LEUKOCYTE ESTERASE, URINE: NEGATIVE
LYMPHOCYTES # BLD: 16 % (ref 24–44)
MAGNESIUM: 1.7 MG/DL (ref 1.6–2.6)
MCH RBC QN AUTO: 29.1 PG (ref 26–34)
MCHC RBC AUTO-ENTMCNC: 33.9 G/DL (ref 31–37)
MCV RBC AUTO: 85.7 FL (ref 80–100)
MONOCYTES # BLD: 6 % (ref 1–7)
NITRITE, URINE: NEGATIVE
NRBC AUTOMATED: ABNORMAL PER 100 WBC
PDW BLD-RTO: 14.3 % (ref 11.5–14.9)
PH UA: 7.5 (ref 5–8)
PLATELET # BLD: 329 K/UL (ref 150–450)
PLATELET ESTIMATE: ABNORMAL
PMV BLD AUTO: 7.4 FL (ref 6–12)
POTASSIUM SERPL-SCNC: 3.9 MMOL/L (ref 3.7–5.3)
PROTEIN UA: NEGATIVE
RBC # BLD: 4.62 M/UL (ref 4–5.2)
RBC # BLD: ABNORMAL 10*6/UL
SEG NEUTROPHILS: 78 % (ref 36–66)
SEGMENTED NEUTROPHILS ABSOLUTE COUNT: 8 K/UL (ref 1.3–9.1)
SODIUM BLD-SCNC: 133 MMOL/L (ref 135–144)
SPECIFIC GRAVITY UA: 1.02 (ref 1–1.03)
TOTAL PROTEIN: 6.8 G/DL (ref 6.4–8.3)
TURBIDITY: CLEAR
URINE HGB: NEGATIVE
UROBILINOGEN, URINE: NORMAL
WBC # BLD: 10.2 K/UL (ref 3.5–11)
WBC # BLD: ABNORMAL 10*3/UL

## 2021-02-11 PROCEDURE — 96374 THER/PROPH/DIAG INJ IV PUSH: CPT

## 2021-02-11 PROCEDURE — 83735 ASSAY OF MAGNESIUM: CPT

## 2021-02-11 PROCEDURE — 85025 COMPLETE CBC W/AUTO DIFF WBC: CPT

## 2021-02-11 PROCEDURE — 81003 URINALYSIS AUTO W/O SCOPE: CPT

## 2021-02-11 PROCEDURE — 6360000002 HC RX W HCPCS: Performed by: EMERGENCY MEDICINE

## 2021-02-11 PROCEDURE — 36415 COLL VENOUS BLD VENIPUNCTURE: CPT

## 2021-02-11 PROCEDURE — 99284 EMERGENCY DEPT VISIT MOD MDM: CPT

## 2021-02-11 PROCEDURE — 93005 ELECTROCARDIOGRAM TRACING: CPT | Performed by: EMERGENCY MEDICINE

## 2021-02-11 PROCEDURE — 2580000003 HC RX 258: Performed by: EMERGENCY MEDICINE

## 2021-02-11 PROCEDURE — 80053 COMPREHEN METABOLIC PANEL: CPT

## 2021-02-11 RX ORDER — LANOLIN ALCOHOL/MO/W.PET/CERES
25 CREAM (GRAM) TOPICAL ONCE
Status: DISCONTINUED | OUTPATIENT
Start: 2021-02-11 | End: 2021-02-11 | Stop reason: HOSPADM

## 2021-02-11 RX ORDER — LABETALOL 200 MG/1
100 TABLET, FILM COATED ORAL 2 TIMES DAILY
Qty: 60 TABLET | Refills: 0 | Status: SHIPPED | OUTPATIENT
Start: 2021-02-11 | End: 2021-04-14

## 2021-02-11 RX ORDER — BLOOD PRESSURE TEST KIT-WRIST
KIT MISCELLANEOUS
Qty: 1 EACH | Refills: 0 | Status: SHIPPED | OUTPATIENT
Start: 2021-02-11 | End: 2022-04-19

## 2021-02-11 RX ORDER — SODIUM CHLORIDE, SODIUM LACTATE, POTASSIUM CHLORIDE, AND CALCIUM CHLORIDE .6; .31; .03; .02 G/100ML; G/100ML; G/100ML; G/100ML
2000 INJECTION, SOLUTION INTRAVENOUS ONCE
Status: COMPLETED | OUTPATIENT
Start: 2021-02-11 | End: 2021-02-11

## 2021-02-11 RX ORDER — METOCLOPRAMIDE HYDROCHLORIDE 5 MG/ML
10 INJECTION INTRAMUSCULAR; INTRAVENOUS ONCE
Status: COMPLETED | OUTPATIENT
Start: 2021-02-11 | End: 2021-02-11

## 2021-02-11 RX ADMIN — SODIUM CHLORIDE, POTASSIUM CHLORIDE, SODIUM LACTATE AND CALCIUM CHLORIDE 2000 ML: 600; 310; 30; 20 INJECTION, SOLUTION INTRAVENOUS at 09:53

## 2021-02-11 RX ADMIN — METOCLOPRAMIDE 10 MG: 5 INJECTION, SOLUTION INTRAMUSCULAR; INTRAVENOUS at 10:13

## 2021-02-11 ASSESSMENT — PAIN DESCRIPTION - DESCRIPTORS: DESCRIPTORS: CRAMPING

## 2021-02-11 ASSESSMENT — ENCOUNTER SYMPTOMS
DIARRHEA: 0
NAUSEA: 1

## 2021-02-11 ASSESSMENT — PAIN DESCRIPTION - LOCATION: LOCATION: ABDOMEN

## 2021-02-11 NOTE — CONSULTS
[address]    Patient Name: Nanda Yoo     Patient : 1995  Room/Bed:   Admission Date/Time: 2021  9:13 AM  Primary Care Physician: Rufus Bolden PA-C    Consulting Provider: Lightheaded, dizzy  Reason for Consult:  pregnant    CC:   Chief Complaint   Patient presents with    Emesis During Pregnancy    Abdominal Cramping                HPI: Nadna Yoo is a 26 y.o. female  who is Michael Diesel presents with lightheadedness and dizziness which she attributes to starting Labetalol 200 BID yesterday evening and then another dose at 0700 this morning. She has had nausea and vomiting at least once every morning since the start of the pregnancy which has not worsened and zofran has helped. She denies any fever, chills, diarrhea, recent sick contacts and she has been able to tolerate food and drink. She divine any trauma, fall, HA, VC, RUQ pain. She was previously on metoprolol nightly a few years ago but never had any symptoms. Pt was seen in the office yesterday and BPs were 393O systolic with repeat BP 846J systolic and patient was diagnosed with chronic hypertension and started on labetalol 200 BID yesterday evening. ED course: Pt given LR 2L bolus, reglan 10mg IV x1, B6 PO x1 and patient reports feeling much better. CBC, CMP WNL with EKG NSR. UA with low suspicion for UTI.        REVIEW OF SYSTEMS:   Constitutional: negative fever, negative chills, negative weight changes   HEENT: negative visual disturbances, negative headaches, positive dizziness, positive lightheadedness  Breast: negative breast abnormalities, negative breast lumps, negative nipple discharge  Respiratory: negative dyspnea, negative cough, negative SOB  Cardiovascular: negative chest pain,  negative palpitations, negative arrhythmia, negative syncope   Gastrointestinal: negative abdominal pain, negative RUQ pain, positive N/V, negative diarrhea, negative constipation, negative bowel changes  Genitourinary: negative dysuria, negative hematuria, negative urinary incontinence, negative vaginal discharge, negative vaginal bleeding  Dermatological: negative rash, negative pruritis, negative mole or other skin changes  Hematologic: negative bruising, negative personal/family history of DVT/PE  Immunologic/Lymphatic: negative recent illness, negative recent sick contact  Musculoskeletal: negative back pain, negative myalgias, negative arthralgias  Neurological:  negative dizziness, negative migraines, negative seizures, negative weakness  Behavior/Psych: negative depression, negative anxiety, negative SI, negative HI    _______________________________________________________________________      OBSTETRICAL HISTORY:   OB History    Para Term  AB Living   1 0 0 0 0 0   SAB TAB Ectopic Molar Multiple Live Births   0 0 0 0 0 0      # Outcome Date GA Lbr Von/2nd Weight Sex Delivery Anes PTL Lv   1 Current                PAST MEDICAL HISTORY:   has a past medical history of Dysmenorrhea and Menorrhagia. PAST SURGICAL HISTORY:   has a past surgical history that includes intrauterine device insertion (16 ); Endometrial biopsy (16); and Intrauterine Device Removal (2017).     ALLERGIES:  Allergies as of 2021 - Review Complete 2021   Allergen Reaction Noted    Amoxil [amoxicillin] Swelling 2020       MEDICATIONS:  Current Facility-Administered Medications   Medication Dose Route Frequency Provider Last Rate Last Admin    lactated ringers bolus  2,000 mL Intravenous Once Isiah Henderson MD 1,000 mL/hr at 21 0953 2,000 mL at 21 0953    vitamin B-6 (PYRIDOXINE) tablet 25 mg  25 mg Oral Once Isiah Henderson MD         Current Outpatient Medications   Medication Sig Dispense Refill    labetalol (NORMODYNE) 200 MG tablet Take 1 tablet by mouth 2 times daily 60 tablet 2    ondansetron (ZOFRAN ODT) 4 MG disintegrating tablet Take 1 tablet by mouth every 8 hours as needed for Nausea or Vomiting 30 tablet 1    Doxylamine-Pyridoxine ER 20-20 MG TBCR Take 1 tablet by mouth 2 times daily 60 tablet 1    promethazine (PHENERGAN) 25 MG tablet Take 1 tablet by mouth 3 times daily as needed for Nausea 30 tablet 0    Prenatal Vit-Fe Fumarate-FA (PRENATAL VITAMIN PO) Take by mouth      busPIRone (BUSPAR) 5 MG tablet Take 1 tablet by mouth 2 times daily 180 tablet 2    albuterol sulfate HFA (PROVENTIL HFA) 108 (90 Base) MCG/ACT inhaler Inhale 2 puffs into the lungs every 6 hours as needed for Wheezing or Shortness of Breath 1 Inhaler 0    Lactobacillus (PROBIOTIC ACIDOPHILUS PO) Take by mouth      fluticasone (FLONASE) 50 MCG/ACT nasal spray 1 spray by Each Nostril route daily      Fexofenadine HCl (ALLEGRA ALLERGY PO) Take by mouth         FAMILY HISTORY:  Family History of Breast, Ovarian, Colon or Uterine Cancer: No   family history includes Alcohol Abuse in her mother; Arthritis in her maternal grandmother; Cancer (age of onset: 61) in her paternal aunt; Diabetes in her father; Heart Disease in her paternal grandfather and paternal grandmother; High Blood Pressure in her paternal grandfather and paternal grandmother; Hypertension in her father; Osteoarthritis in her maternal grandmother; Osteoporosis in her maternal grandmother; Substance Abuse in her mother. SOCIAL HISTORY:   reports that she has never smoked. She has never used smokeless tobacco. She reports current alcohol use. She reports that she does not use drugs. ________________________________________________________________________                                    VITALS:  Vitals:    02/11/21 1000 02/11/21 1015 02/11/21 1030 02/11/21 1220   BP: 111/69 105/71 105/64 113/82   Pulse:    88   Resp:       Temp:       TempSrc:       SpO2: 98% 98% 98% 99%   Weight:                                                                   PHYSICAL EXAM:   General Appearance: Appears healthy. Alert; in no acute distress. Pleasant. Skin: Skin color, texture, turgor normal. No rashes or lesions. Lymphatic: No abnormally enlarged lymph nodes. HEENT: Normocephalic and atraumatic, moist mucous membranes, trachea midline  Respiratory: Normal expansion. Clear to auscultation. No rales, rhonchi, or wheezing. Cardiovascular: Normal rate, regular rhythm, normal S1 and S2, no murmurs, rubs or gallops. Abdomen: Soft, non-tender, no rebound, guarding, rigidity, non-distended  Pelvic Exam: Not indicated  Rectal Exam: Exam declined by patient. Musculoskeletal: No joint swelling, deformity, or tenderness. , no gross abnormalities. Extremities: Non-tender BLE and non-edematous. Psych: Oriented to time, place and person, mood and affect are within normal limits. OMM EXAM:  The patient did not complain of a Chief complaint requiring OMM.   Chief Complaint:none  Structural Exam: No Interest    LAB RESULTS:  Results for orders placed or performed during the hospital encounter of 02/11/21   CBC Auto Differential   Result Value Ref Range    WBC 10.2 3.5 - 11.0 k/uL    RBC 4.62 4.0 - 5.2 m/uL    Hemoglobin 13.4 12.0 - 16.0 g/dL    Hematocrit 39.6 36 - 46 %    MCV 85.7 80 - 100 fL    MCH 29.1 26 - 34 pg    MCHC 33.9 31 - 37 g/dL    RDW 14.3 11.5 - 14.9 %    Platelets 551 589 - 537 k/uL    MPV 7.4 6.0 - 12.0 fL    NRBC Automated NOT REPORTED per 100 WBC    Differential Type NOT REPORTED     Seg Neutrophils 78 (H) 36 - 66 %    Lymphocytes 16 (L) 24 - 44 %    Monocytes 6 1 - 7 %    Eosinophils % 0 0 - 4 %    Basophils 0 0 - 2 %    Immature Granulocytes NOT REPORTED 0 %    Segs Absolute 8.00 1.3 - 9.1 k/uL    Absolute Lymph # 1.60 1.0 - 4.8 k/uL    Absolute Mono # 0.60 0.1 - 1.3 k/uL    Absolute Eos # 0.00 0.0 - 0.4 k/uL    Basophils Absolute 0.00 0.0 - 0.2 k/uL    Absolute Immature Granulocyte NOT REPORTED 0.00 - 0.30 k/uL    WBC Morphology NOT REPORTED     RBC Morphology NOT REPORTED     Platelet Estimate NOT REPORTED Comprehensive Metabolic Panel   Result Value Ref Range    Glucose 102 (H) 70 - 99 mg/dL    BUN 8 6 - 20 mg/dL    CREATININE 0.49 (L) 0.50 - 0.90 mg/dL    Bun/Cre Ratio NOT REPORTED 9 - 20    Calcium 9.2 8.6 - 10.4 mg/dL    Sodium 133 (L) 135 - 144 mmol/L    Potassium 3.9 3.7 - 5.3 mmol/L    Chloride 102 98 - 107 mmol/L    CO2 22 20 - 31 mmol/L    Anion Gap 9 9 - 17 mmol/L    Alkaline Phosphatase 69 35 - 104 U/L    ALT 14 5 - 33 U/L    AST 12 <32 U/L    Total Bilirubin 0.21 (L) 0.3 - 1.2 mg/dL    Total Protein 6.8 6.4 - 8.3 g/dL    Albumin 3.9 3.5 - 5.2 g/dL    Albumin/Globulin Ratio NOT REPORTED 1.0 - 2.5    GFR Non-African American >60 >60 mL/min    GFR African American >60 >60 mL/min    GFR Comment          GFR Staging NOT REPORTED    Urinalysis   Result Value Ref Range    Color, UA YELLOW YELLOW    Turbidity UA CLEAR CLEAR    Glucose, Ur NEGATIVE NEGATIVE    Bilirubin Urine NEGATIVE NEGATIVE    Ketones, Urine NEGATIVE NEGATIVE    Specific Gravity, UA 1.018 1.000 - 1.030    Urine Hgb NEGATIVE NEGATIVE    pH, UA 7.5 5.0 - 8.0    Protein, UA NEGATIVE NEGATIVE    Urobilinogen, Urine Normal Normal    Nitrite, Urine NEGATIVE NEGATIVE    Leukocyte Esterase, Urine NEGATIVE NEGATIVE    Urinalysis Comments       Microscopic exam not performed based on chemical results unless requested in original order.    Magnesium   Result Value Ref Range    Magnesium 1.7 1.6 - 2.6 mg/dL   EKG 12 Lead   Result Value Ref Range    Ventricular Rate 84 BPM    Atrial Rate 84 BPM    P-R Interval 148 ms    QRS Duration 80 ms    Q-T Interval 366 ms    QTc Calculation (Bazett) 432 ms    P Axis 82 degrees    R Axis 82 degrees    T Axis 72 degrees          DIAGNOSTICS:  Us Ob Less Than 14 Weeks Single Or First Gestation    Result Date: 2021  SASHA 2021 Early IUP  Yolk sac visualized       ASSESSMENT & PLAN:  Latricia Hodgkin is a 26 y.o. female  11w6d IUP who presents with lightheadedness and dizziness with newly diagnosed Chronic Hypertension newly started on medications   - VSS with BP normotensive 120s/70s   - Pt denies any HA, VC   - Pt started on labetalol 200 BID and last took it 0700 this AM, will decrease dose to 100 BID (pt requesting to cut pills in half instead of new Rx)   - ED course: Pt given LR 2L bolus, reglan 10mg IV x1, B6 PO x1 and patient reports feeling much better. CBC, CMP WNL with EKG NSR. UA with low suspicion for UTI. - Pt reports resolved symptoms   - Likely secondary to Labetalol medication start   - Pt advised of holding parameters of lightheadedness, dizziness, CP, SOB, HA, VC or BPs  <713 systolic or <42 diastolic and to notify office if BP >/= systolic 987H/ diastolic 769L   - Will send Rx for BP cuff at home and advised pt to bring cuff to office to compare and calibrate    - Pt to call and make appt in 1 week for BP check/follow up    Dysmenorrhea    Anxiety   - Stable on no medications    Patient Active Problem List    Diagnosis Date Noted    Sinus tachycardia 08/10/2020     Priority: High    Atypical chest pain 08/10/2020     Priority: High    Essential hypertension 02/10/2021    COVID-19 08/10/2020    Anxiety 08/10/2020    Dysmenorrhea 08/28/2012    Menorrhagia 02/20/2012       Plan discussed with Dr. Iain Clements, who is agreeable.      Attending's Name: Dr. Luli Cuadra DO  Ob/Gyn Resident  2/11/2021, 10:50 AM

## 2021-02-11 NOTE — ED PROVIDER NOTES
EMERGENCY DEPARTMENT ENCOUNTER    Pt Name: Yvan Meneses  MRN: [id number]  [place] 1995  Date of evaluation: 21  CHIEF COMPLAINT       Chief Complaint   Patient presents with    Emesis During Pregnancy    Abdominal Cramping     HISTORY OF PRESENT ILLNESS     Nausea & Vomiting  Severity:  Severe  Timing:  Constant  Progression:  Worsening  Chronicity:  Recurrent  Recent urination:  Normal  Relieved by:  Nothing  Exacerbated by: started labetalol 200 mg bid yesterday, making her feel worse. Ineffective treatments:  Antiemetics (zofran, has phenergan at home also)  Associated symptoms: no diarrhea, no fever, no headaches and no myalgias    fatigue  Feels tired, weak, dizzy after starting labetalol yesterday  12 weeks pregnant  Dr Carrie Mendoza is her GYN          REVIEW OF SYSTEMS     Review of Systems   Constitutional: Negative for fever. Gastrointestinal: Positive for nausea. Negative for diarrhea. Musculoskeletal: Negative for myalgias. Neurological: Negative for headaches. All other systems reviewed and are negative.     PASTMEDICAL HISTORY     Past Medical History:   Diagnosis Date    Dysmenorrhea     Menorrhagia      Past Problem List  Patient Active Problem List   Diagnosis Code    Menorrhagia N92.0    Dysmenorrhea N94.6    COVID-19 U07.1    Anxiety F41.9    Sinus tachycardia R00.0    Atypical chest pain R07.89    Essential hypertension I10     SURGICAL HISTORY       Past Surgical History:   Procedure Laterality Date    ENDOMETRIAL BIOPSY  16    INTRAUTERINE DEVICE INSERTION  16     [place]    INTRAUTERINE DEVICE REMOVAL  2017     CURRENT MEDICATIONS       Discharge Medication List as of 2021 12:11 PM      CONTINUE these medications which have NOT CHANGED    Details   ondansetron (ZOFRAN ODT) 4 MG disintegrating tablet Take 1 tablet by mouth every 8 hours as needed for Nausea or Vomiting, Disp-30 tablet, R-1Normal      Doxylamine-Pyridoxine ER 20-20 MG TBCR Take 1 tablet by mouth 2 times daily, Disp-60 tablet, R-1Normal      promethazine (PHENERGAN) 25 MG tablet Take 1 tablet by mouth 3 times daily as needed for Nausea, Disp-30 tablet, R-0Normal      Prenatal Vit-Fe Fumarate-FA (PRENATAL VITAMIN PO) Take by mouthHistorical Med      busPIRone (BUSPAR) 5 MG tablet Take 1 tablet by mouth 2 times daily, Disp-180 tablet, R-2Normal      albuterol sulfate HFA (PROVENTIL HFA) 108 (90 Base) MCG/ACT inhaler Inhale 2 puffs into the lungs every 6 hours as needed for Wheezing or Shortness of Breath, Disp-1 Inhaler,R-0Normal      Lactobacillus (PROBIOTIC ACIDOPHILUS PO) Take by mouthHistorical Med      fluticasone (FLONASE) 50 MCG/ACT nasal spray 1 spray by Each Nostril route dailyHistorical Med      Fexofenadine HCl (ALLEGRA ALLERGY PO) Take by mouthHistorical Med           ALLERGIES     is allergic to amoxil [amoxicillin]. FAMILY HISTORY     She indicated that her mother is alive. She indicated that her father is alive. She indicated that her maternal grandmother is alive. She indicated that the status of her maternal grandfather is unknown. She indicated that her paternal grandmother is alive. She indicated that her paternal grandfather is alive. She indicated that her paternal aunt is . SOCIAL HISTORY       Social History     Tobacco Use    Smoking status: Never Smoker    Smokeless tobacco: Never Used   Substance Use Topics    Alcohol use: Yes     Alcohol/week: 0.0 standard drinks     Comment: once a month    Drug use: No     PHYSICAL EXAM     INITIAL VITALS: /82   Pulse 88   Temp 98.4 °F (36.9 °C) (Oral)   Resp 14   Wt 136 lb (61.7 kg)   LMP 2020 (Exact Date)   SpO2 99%   BMI 25.71 kg/m²    Physical Exam  Vitals signs reviewed. Constitutional:       General: She is not in acute distress. Appearance: Normal appearance. She is well-developed. She is not diaphoretic. HENT:      Head: Normocephalic and atraumatic.       Right Ear: External ear normal.      Left Ear: External ear normal.      Nose: Nose normal. No congestion. Mouth/Throat:      Mouth: Mucous membranes are moist.      Pharynx: Oropharynx is clear. Eyes:      General:         Right eye: No discharge. Left eye: No discharge. Conjunctiva/sclera: Conjunctivae normal.      Pupils: Pupils are equal, round, and reactive to light. Neck:      Musculoskeletal: Normal range of motion and neck supple. Trachea: No tracheal deviation. Cardiovascular:      Rate and Rhythm: Normal rate and regular rhythm. Pulses: Normal pulses. Heart sounds: Normal heart sounds. Pulmonary:      Effort: Pulmonary effort is normal. No respiratory distress. Breath sounds: Normal breath sounds. No stridor. No wheezing or rales. Abdominal:      Palpations: Abdomen is soft. Tenderness: There is no abdominal tenderness. There is no guarding or rebound. Musculoskeletal: Normal range of motion. General: No tenderness or deformity. Skin:     General: Skin is warm and dry. Capillary Refill: Capillary refill takes less than 2 seconds. Findings: No erythema or rash. Neurological:      General: No focal deficit present. Mental Status: She is alert and oriented to person, place, and time. Cranial Nerves: No cranial nerve deficit. Coordination: Coordination normal.   Psychiatric:         Mood and Affect: Mood normal.         Behavior: Behavior normal.         Thought Content:  Thought content normal.         Judgment: Judgment normal.         MEDICAL DECISION MAKING:       ED Course as of Feb 11 1600   Thu Feb 11, 2021   1003 Giving antiemetics  Calling GYN resident for eval since patient was just put on labetalol yesterday    [WM]   [address] [facility] resident    []   [phone] Patient feeling much better  [place] GYN plan to change labetalol to 100 mg bid  Patient has nausea meds at home  Discussed with patient anticipatory guidance, discharge instructions, follow up her GYN 24 hours        [WM]      ED Course User Index  [WM] Ashlie Sarabia MD       PROCEDURES:  Bedside ultrasound performed by myself, I interpreted images, images archived. Live IUP with -170, fetal movement    Procedures    DIAGNOSTIC RESULTS   EKG:All EKG's are interpreted by the Emergency Department Physician who either signs or Co-signs this chart in the absence of a cardiologist.    Normal ekg    LABS: All lab results were reviewed by myself, and all abnormals are listed below. Labs Reviewed   CBC WITH AUTO DIFFERENTIAL - Abnormal; Notable for the following components:       Result Value    Seg Neutrophils 78 (*)     Lymphocytes 16 (*)     All other components within normal limits   COMPREHENSIVE METABOLIC PANEL - Abnormal; Notable for the following components:    Glucose 102 (*)     CREATININE 0.49 (*)     Sodium 133 (*)     Total Bilirubin 0.21 (*)     All other components within normal limits   URINALYSIS   MAGNESIUM       EMERGENCY DEPARTMENTCOURSE:         Vitals:    Vitals:    02/11/21 1000 02/11/21 1015 02/11/21 1030 02/11/21 1220   BP: 111/69 105/71 105/64 113/82   Pulse:    88   Resp:       Temp:       TempSrc:       SpO2: 98% 98% 98% 99%   Weight:           The patient was given the following medications while in the emergency department:  Orders Placed This Encounter   Medications    lactated ringers bolus    metoclopramide (REGLAN) injection 10 mg    DISCONTD: vitamin B-6 (PYRIDOXINE) tablet 25 mg    labetalol (NORMODYNE) 200 MG tablet     Sig: Take 0.5 tablets by mouth 2 times daily     Dispense:  60 tablet     Refill:  0    Blood Pressure Monitoring (SM WRIST CUFF BP MONITOR) MISC     Sig: Please take BP before taking medications or when feeling lightheaded/dizzy. Dispense:  1 each     Refill:  0     CONSULTS:  IP CONSULT TO OB GYN    FINAL IMPRESSION      1. Mild hyperemesis gravidarum, antepartum    2.  Medication side effect, initial encounter DISPOSITION/PLAN   DISPOSITION Decision To Discharge 02/11/2021 12:30:53 PM      PATIENT REFERRED TO:  Scahin Elias DO  [address] [id number] [phone]    Call in 1 week  BP check in the office    DISCHARGE MEDICATIONS:  Discharge Medication List as of 2/11/2021 12:11 PM        Rizwana Fox MD  Attending Emergency Physician                   Rizwana Fox MD  02/11/21 [id number]

## 2021-02-11 NOTE — ED TRIAGE NOTES
Mode of arrival (squad #, walk in, police, etc) : walk in        Chief complaint(s): emesis, dizziness, abdominal cramping        Arrival Note (brief scenario, treatment PTA, etc). : Pt arrives to ED c/o emesis, dizziness, and abdominal cramping. Pt states these symptoms started last night. Pt states she saw her OBGYN yesterday for a 12 week prenatal appointment. Pt states her blood pressure was high so she was started on labetalol. Pt states she took her first dose yesterday and her second dose this morning. Pt states she is still feeling dizzy and also now has some abdominal cramping. Pt denies any vaginal bleeding or discharge. Pt is A&Ox4, eupneic, PWD. GCS=15. Call light in reach. Mother at bedside with pt.         C= \"Have you ever felt that you should Cut down on your drinking? \"  No  A= \"Have people Annoyed you by criticizing your drinking? \"  No  G= \"Have you ever felt bad or Guilty about your drinking? \"  No  E= \"Have you ever had a drink as an Eye-opener first thing in the morning to steady your nerves or to help a hangover? \"  No      Deferred []      Reason for deferring: N/A    *If yes to two or more: probable alcohol abuse. *

## 2021-02-11 NOTE — TELEPHONE ENCOUNTER
Pt called in as she had an appt yesterday and elevated blood pressure/hypertesnion- was started on labateol yesterday- last night was not feeling well- seeing double vision- this morning same sx and not feeling good at all- stated she has to work at 930 and does not feel like she can even drive- stated due to sx and elevated bp yesterday to go into the hospital to be evaluated - stated Dr Swapnil Sheikh is on call and they will notify him- also asked if she had anyone to drive her to the hospital she stated her fiance is out of town- asked if she had any family or friends she can call she stated yes- I stated recommend to call someone to drive her as she stated she felt like she can not drive her self to work- pt stated she understood and was going to report to the ED

## 2021-02-12 ENCOUNTER — PATIENT MESSAGE (OUTPATIENT)
Dept: OBGYN CLINIC | Age: 26
End: 2021-02-12

## 2021-02-12 LAB
EKG ATRIAL RATE: 84 BPM
EKG P AXIS: 82 DEGREES
EKG P-R INTERVAL: 148 MS
EKG Q-T INTERVAL: 366 MS
EKG QRS DURATION: 80 MS
EKG QTC CALCULATION (BAZETT): 432 MS
EKG R AXIS: 82 DEGREES
EKG T AXIS: 72 DEGREES
EKG VENTRICULAR RATE: 84 BPM

## 2021-02-12 PROCEDURE — 93010 ELECTROCARDIOGRAM REPORT: CPT | Performed by: INTERNAL MEDICINE

## 2021-02-12 NOTE — TELEPHONE ENCOUNTER
From: Pedrito Palomino  To: Nish Sanford DO  Sent: 2/12/2021 8:48 AM EST  Subject: Non-Urgent Medical Question    Hello-     I called earlier this morning and spoke with Dr. Isabella Galvan. I just need a work note for today and yesterday. If you could please fax it to [phone] attn: Mitul Alonzo and to [phone] attn: Gregg Barker I would appreciate it. Also I wanted to mention I talked to my work and I will need fmla intermittent form filled out I just have no way of printing them off at this time but once I get them printed I'll have them faxed.  Thank you

## 2021-02-12 NOTE — LETTER
[facility] [address]  Phone: [phone]  Fax: [phone]    Lucretia Costa        February 12, 2021     Patient: Asmita Corrales   YOB: 1995   Date of Visit: 2/12/2021       To Whom It May Concern: It is my medical opinion that Asmita Corrales to be off work on 2/11/21a and 2/12/21 due to being seen in the ED. Pateint is able to return to work on her next scheduled shift. If you have any questions or concerns, please don't hesitate to call.     Sincerely,        Migel Ruiz, DO

## 2021-02-17 ENCOUNTER — NURSE ONLY (OUTPATIENT)
Dept: OBGYN CLINIC | Age: 26
End: 2021-02-17

## 2021-02-17 VITALS — SYSTOLIC BLOOD PRESSURE: 130 MMHG | DIASTOLIC BLOOD PRESSURE: 80 MMHG

## 2021-02-17 DIAGNOSIS — Z34.91 NORMAL PREGNANCY IN FIRST TRIMESTER: Primary | ICD-10-CM

## 2021-02-17 DIAGNOSIS — I10 CHRONIC HYPERTENSION: ICD-10-CM

## 2021-02-17 DIAGNOSIS — Z3A.12 12 WEEKS GESTATION OF PREGNANCY: ICD-10-CM

## 2021-02-17 PROCEDURE — 0502F SUBSEQUENT PRENATAL CARE: CPT | Performed by: OBSTETRICS & GYNECOLOGY

## 2021-02-17 RX ORDER — ONDANSETRON 4 MG/1
TABLET, FILM COATED ORAL
Qty: 30 TABLET | Refills: 2 | Status: SHIPPED
Start: 2021-02-17 | End: 2021-04-14

## 2021-02-17 NOTE — LETTER
[facility]  [address] [place] [address]  Phone: [phone]  Fax: [phone]    [facility]        February 17, 2021     Patient: Crow Schilling   YOB: 1995   Date of Visit: 2/17/2021       To Whom It May Concern: It is my medical opinion that Crow Schilling due to high risk pregnancy and symptoms, she is to opt out of the hands on portion for CPR at this time. If you have any questions or concerns, please don't hesitate to call.     Sincerely,        SCHEDULE, P [facility]

## 2021-02-17 NOTE — PROGRESS NOTES
Patient states she has not been taking blood pressure medications due to her blood pressure being \"low\" at home 100's over 60's- her blood pressure today is 140/72. Will have patient sit, and recheck in 5 mins.

## 2021-02-17 NOTE — PROGRESS NOTES
Pt. Seen and examined. BP controlled today. She has had cHTN with severe range and then put on Labetalol. This dropped her BP too low. She had covid and has had more CV issues since that time. Discussed possible appt with cardiology. Vitals:    02/17/21 1651   BP: 130/80     No results found for this or any previous visit (from the past 100 hour(s)).      Continue close BP checks  RTO 2 weeks  MFM as scheduled, appt 4/12

## 2021-02-24 ENCOUNTER — TELEPHONE (OUTPATIENT)
Dept: OBGYN CLINIC | Age: 26
End: 2021-02-24

## 2021-02-24 NOTE — TELEPHONE ENCOUNTER
Checking BP twice a day    BP meds made it drop really now, she is NO longer taking    2/19 119/68    2/20 102/34  102/59 HR 68 (felt dizzy)  2/21 128/51 HR 95   2/22 97/49 HR 96 (felt awful Monday) second reading was 92/60 HR 84, pale, dizzy  2/23 120/74    2/24 129/75 - feeling very tired today       Most BP readings are OK, few days it has dropped low and she is having symptoms.  Again not taking any meds

## 2021-03-03 ENCOUNTER — TELEPHONE (OUTPATIENT)
Dept: OBGYN CLINIC | Age: 26
End: 2021-03-03

## 2021-03-03 NOTE — TELEPHONE ENCOUNTER
Patient called in with her blood pressure reading for the week. 2/25- 110/79 heart rate 106   100/60 heart rate 64-irreg   108/68 heart rate 68-irreg     2//70 heart rate 117    2/27- 96/70 heart rate 64-irreg             108/66 heart rate 76- irreg    2/28 119/77 heart rate 80   119/66 heart rate 80    3/1 109/60 heart rate 114   100/60 heart rate 88   118/68 heart rate 86    3/2 120/70 heart rate 122   100/68 heart rate 88 irreg   102/56 heart rate 76     3/3  119/70 heart rate 106   90/68 heart rate 80    Also sending to her cardiologist- but wanted you to know, if you had any recommendations. She is NOT on any medications.

## 2021-03-07 ENCOUNTER — HOSPITAL ENCOUNTER (OUTPATIENT)
Age: 26
Discharge: HOME | End: 2021-03-07
Payer: COMMERCIAL

## 2021-03-07 DIAGNOSIS — Z3A.11 11 WEEKS GESTATION OF PREGNANCY: ICD-10-CM

## 2021-03-07 DIAGNOSIS — I10 CHRONIC HYPERTENSION: ICD-10-CM

## 2021-03-07 LAB
CREATININE URINE: 176.1 MG/DL (ref 28–217)
HOURS COLLECTED: 24 H
LACTATE DEHYDROGENASE: 144 U/L (ref 135–214)
PROTEIN 24 HOUR URINE: 106 MG/24 H
PROTEIN,TOT TIMED UR: NORMAL MG/X H
TOTAL PROTEIN, URINE: 10 MG/DL
URIC ACID: 3.7 MG/DL (ref 2.4–5.7)
URINE TOTAL PROTEIN CREATININE RATIO: 0.06 (ref 0–0.2)
URINE TOTAL PROTEIN: 4 MG/DL
VOLUME: 2650 ML

## 2021-03-07 PROCEDURE — 82570 ASSAY OF URINE CREATININE: CPT

## 2021-03-07 PROCEDURE — 81050 URINALYSIS VOLUME MEASURE: CPT

## 2021-03-07 PROCEDURE — 84550 ASSAY OF BLOOD/URIC ACID: CPT

## 2021-03-07 PROCEDURE — 84156 ASSAY OF PROTEIN URINE: CPT

## 2021-03-07 PROCEDURE — 36415 COLL VENOUS BLD VENIPUNCTURE: CPT

## 2021-03-07 PROCEDURE — 83615 LACTATE (LD) (LDH) ENZYME: CPT

## 2021-03-10 ENCOUNTER — ROUTINE PRENATAL (OUTPATIENT)
Dept: OBGYN CLINIC | Age: 26
End: 2021-03-10

## 2021-03-10 VITALS — WEIGHT: 140 LBS | DIASTOLIC BLOOD PRESSURE: 74 MMHG | SYSTOLIC BLOOD PRESSURE: 118 MMHG | BODY MASS INDEX: 26.47 KG/M2

## 2021-03-10 DIAGNOSIS — Z34.92 NORMAL PREGNANCY IN SECOND TRIMESTER: Primary | ICD-10-CM

## 2021-03-10 DIAGNOSIS — I10 CHRONIC HYPERTENSION: ICD-10-CM

## 2021-03-10 DIAGNOSIS — Z3A.15 15 WEEKS GESTATION OF PREGNANCY: ICD-10-CM

## 2021-03-10 PROCEDURE — 0502F SUBSEQUENT PRENATAL CARE: CPT | Performed by: NURSE PRACTITIONER

## 2021-03-10 NOTE — PROGRESS NOTES
ODT) 4 MG disintegrating tablet Take 1 tablet by mouth every 8 hours as needed for Nausea or Vomiting 30 tablet 1    Doxylamine-Pyridoxine ER 20-20 MG TBCR Take 1 tablet by mouth 2 times daily 60 tablet 1    promethazine (PHENERGAN) 25 MG tablet Take 1 tablet by mouth 3 times daily as needed for Nausea 30 tablet 0    Prenatal Vit-Fe Fumarate-FA (PRENATAL VITAMIN PO) Take by mouth      busPIRone (BUSPAR) 5 MG tablet Take 1 tablet by mouth 2 times daily 180 tablet 2    albuterol sulfate HFA (PROVENTIL HFA) 108 (90 Base) MCG/ACT inhaler Inhale 2 puffs into the lungs every 6 hours as needed for Wheezing or Shortness of Breath 1 Inhaler 0    Lactobacillus (PROBIOTIC ACIDOPHILUS PO) Take by mouth      fluticasone (FLONASE) 50 MCG/ACT nasal spray 1 spray by Each Nostril route daily      Fexofenadine HCl (ALLEGRA ALLERGY PO) Take by mouth       No current facility-administered medications for this visit. Past Medical History:   Diagnosis Date    Dysmenorrhea     Menorrhagia        Past Surgical History:   Procedure Laterality Date    ENDOMETRIAL BIOPSY  5/5/16    INTRAUTERINE DEVICE INSERTION  5/5/16     [facility]    INTRAUTERINE DEVICE REMOVAL  03/14/2017     Headaches: no  Swelling: no  Bleeding: no  Discharge: no   Dysuria: no  Nausea: yes improving some though  Heartburn: no  Epigastric pain: no  Contractions: no  Leakage of fluid: no  + fetal movement       Return 4 WEEKS    Labs as indicated n/a    PTL signs reviewed, if indicated  Kick Count Instructions given if indicated: The patient was instructed on fetal kick counts and was given a kick sheet to complete every 8 hours. This is to begin at 28 weeks gestation. She was instructed that the baby should move at a minimum of ten times within one hour after a meal. The patient was instructed to lay down on her left side twenty minutes after eating and count movements for up to one hour with a target value of ten movements.    She was instructed to notify the office if she did not make that target after two attempts or if after any attempt there was less than four movements. After hour numbers reviewed , along with labor signs if indicated. Contractions/cramping between 5-7 minutes and persisting even with attempts of increased water and laying on side. Fluid leakage, bleeding  NST information given no    The patient was counseled on the mandatory call ahead policy. She has been instructed to call the office at anytime prior to going into the hospital so the on-call physician may direct her to the appropriate facility for care. Exceptions were reviewed including but not limited to: Decreased fetal movement, vaginal Bleeding or hemorrhage, trauma, readily expectant delivery, or any instance where she feels 911 should be utilized. Of the 20 minute duration appointment visit, Yarely Ferguson CNP spent at least 50% of the face-to-face time in counseling, explanation of diagnosis, planning of further management, and answering all questions.

## 2021-03-10 NOTE — PATIENT INSTRUCTIONS
Patient Education      Patient Education        Weeks 14 to 25 of Your Pregnancy: Care Instructions  Your Care Instructions     During this time, you may start to \"show,\" so that you look pregnant to people around you. You may also notice some changes in your skin, such as itchy spots on your palms or acne on your face. Your baby is now able to pass urine, and your baby's first stool (meconium) is starting to collect in his or her intestines. Hair is also beginning to grow on your baby's head. At your next visit, between weeks 18 and 20, your doctor may do an ultrasound test. The test allows your doctor to check for certain problems. Your doctor can also tell the sex of your baby. This is a good time to think about whether you want to know whether your baby is a boy or a girl. Talk to your doctor about getting a flu shot to help keep you healthy during your pregnancy. As your pregnancy moves along, it is common to worry or feel anxious. Your body is changing a lot. And you are thinking about giving birth, the health of your baby, and becoming a parent. You can learn to cope with any anxiety and stress you feel. Follow-up care is a key part of your treatment and safety. Be sure to make and go to all appointments, and call your doctor if you are having problems. It's also a good idea to know your test results and keep a list of the medicines you take. How can you care for yourself at home? Reduce stress    · Ask for help with cooking and housekeeping.     · Figure out who or what causes your stress. Avoid these people or situations as much as possible.     · Relax every day. Taking 10- to 15-minute breaks can make a big difference. Take a walk, listen to music, or take a warm bath.     · Learn relaxation techniques at prenatal or yoga class. Or buy a relaxation tape.     · List your fears about having a baby and becoming a parent.  Share the list with someone you trust. Decide which worries are really small, and try to let them go. Exercise    · If you did not exercise much before pregnancy, start slowly. Walking is best. [employer], and do a little more every day.     · Brisk walking, easy jogging, low-impact aerobics, water aerobics, and yoga are good choices. Some sports, such as scuba diving, horseback riding, downhill skiing, gymnastics, and water skiing, are not a good idea.     · Try to do at least 2½ hours a week of moderate exercise, such as a fast walk. One way to do this is to be active 30 minutes a day, at least 5 days a week. It's fine to be active in blocks of 10 minutes or more throughout your day and week.     · Wear loose clothing. And wear shoes and a bra that provide good support.     · Warm up and cool down to start and finish your exercise.     · If you want to use weights, be sure to use light weights. They reduce stress on your joints. Stay at the best weight for you    · Experts recommend that you gain about 1 pound a month during the first 3 months of your pregnancy.     · Experts recommend that you gain about 1 pound a week during your last 6 months of pregnancy, for a total weight gain of 25 to 35 pounds.     · If you are underweight, you will need to gain more weight (about 28 to 40 pounds).     · If you are overweight, you may not need to gain as much weight (about 15 to 25 pounds).     · If you are gaining weight too fast, use common sense. Exercise every day, and limit sweets, fast foods, and fats. Choose lean meats, fruits, and vegetables.     · If you are having twins or more, your doctor may refer you to a dietitian. Where can you learn more? Go to https://[email or web address].[email or web address] org and sign in to your Checkd.In account. Enter P130 in the BetBox box to learn more about \"Weeks 14 to 18 of Your Pregnancy: Care Instructions. \"     If you do not have an account, please click on the \"Sign Up Now\" link.   Current as of: February 11, 2020               Content Version: 12.6  © 5843-0682 [employer]. Care instructions adapted under license by [facility] ([facility]). If you have questions about a medical condition or this instruction, always ask your healthcare professional. [address] any warranty or liability for your use of this information. Second-Trimester Fetal Ultrasound: About This Test  What is it? Fetal ultrasound is a test that uses sound waves to make pictures of your baby (fetus) and placenta inside the uterus. The test is the safest way to find out the age, size, and position of your baby. You also may be able to find out the sex of your baby. (But the test isn't done just to find out a baby's sex.)  No known risks to the mother or the baby are linked to fetal ultrasound. But you may feel anxious if the test reveals a problem with your pregnancy or baby. Why is this test done? In the second trimester, a fetal ultrasound is done to:  · Estimate the number of weeks and days a fetus has developed since the beginning of the pregnancy. This is called the gestational age. · Look at the size and position of the fetus, the placenta, and the fluid that surrounds the fetus. · Find major birth defects, such as heart problems or problems with the brain and spinal cord (neural tube defects). But the test may not be able to find many minor defects and some major birth defects. How do you prepare for the test?  In general, there's nothing you have to do before this test, unless your doctor tells you to. How is the test done? · You may be able to leave your clothes on, or you will be given a gown to wear. · You will lie on your back on a padded examination table. · A gel will be spread on your belly. It will be removed after the test.  · A small, handheld device called a transducer will be pressed against the gel on your skin and moved across your belly several times.   · You may watch the monitor to see the picture of your baby during the test.  What happens after the test?  · You will probably be able to go home right away. · You most likely will be able to go back to your usual activities right away. Follow-up care is a key part of your treatment and safety. Be sure to make and go to all appointments, and call your doctor if you are having problems. It's also a good idea to keep a list of the medicines you take. Ask your doctor when you can expect to have your test results. Where can you learn more? Go to https://Exelispepiceweb.[email or web address] org and sign in to your AccessPay account. Enter E755 in the Kamida box to learn more about \"Second-Trimester Fetal Ultrasound: About This Test.\"     If you do not have an account, please click on the \"Sign Up Now\" link. Current as of: February 11, 2020               Content Version: 12.6  © 3061-3188 [employer]. Care instructions adapted under license by [facility] ([facility]). If you have questions about a medical condition or this instruction, always ask your healthcare professional. [address] any warranty or liability for your use of this information.

## 2021-03-17 ENCOUNTER — PATIENT MESSAGE (OUTPATIENT)
Dept: OBGYN CLINIC | Age: 26
End: 2021-03-17

## 2021-03-17 NOTE — TELEPHONE ENCOUNTER
From: Nicolas Enter  To: TERRENCE Colin CNP  Sent: 3/17/2021 11:43 AM EDT  Subject: Non-Urgent Medical Question    Fercho Muse,     I have noticed lately I have been having really bad dry scalp and was wondering what I can use. Am I able to use anti dandruff shampoos like head and shoulders or nizoral shampoo? Thanks!

## 2021-04-12 ENCOUNTER — ROUTINE PRENATAL (OUTPATIENT)
Dept: PERINATAL CARE | Age: 26
End: 2021-04-12
Payer: COMMERCIAL

## 2021-04-12 ENCOUNTER — HOSPITAL ENCOUNTER (OUTPATIENT)
Age: 26
Discharge: HOME | End: 2021-04-12
Payer: COMMERCIAL

## 2021-04-12 VITALS
DIASTOLIC BLOOD PRESSURE: 76 MMHG | WEIGHT: 141 LBS | HEART RATE: 94 BPM | RESPIRATION RATE: 16 BRPM | HEIGHT: 61 IN | SYSTOLIC BLOOD PRESSURE: 116 MMHG | TEMPERATURE: 97.3 F | BODY MASS INDEX: 26.62 KG/M2

## 2021-04-12 DIAGNOSIS — Z3A.20 20 WEEKS GESTATION OF PREGNANCY: ICD-10-CM

## 2021-04-12 DIAGNOSIS — Z36.86 SCREENING, ANTENATAL, FOR RISK OF PRE-TERM LABOR: ICD-10-CM

## 2021-04-12 DIAGNOSIS — O16.2 HYPERTENSION AFFECTING PREGNANCY IN SECOND TRIMESTER: Primary | ICD-10-CM

## 2021-04-12 PROCEDURE — 76811 OB US DETAILED SNGL FETUS: CPT | Performed by: OBSTETRICS & GYNECOLOGY

## 2021-04-12 PROCEDURE — 99243 OFF/OP CNSLTJ NEW/EST LOW 30: CPT | Performed by: OBSTETRICS & GYNECOLOGY

## 2021-04-12 PROCEDURE — 76817 TRANSVAGINAL US OBSTETRIC: CPT | Performed by: OBSTETRICS & GYNECOLOGY

## 2021-04-12 PROCEDURE — 36415 COLL VENOUS BLD VENIPUNCTURE: CPT

## 2021-04-12 PROCEDURE — 82105 ALPHA-FETOPROTEIN SERUM: CPT

## 2021-04-12 RX ORDER — BUSPIRONE HYDROCHLORIDE 5 MG/1
5 TABLET ORAL 2 TIMES DAILY
COMMUNITY
Start: 2018-04-12 | End: 2021-08-17 | Stop reason: SDUPTHER

## 2021-04-13 LAB
SEND OUT REPORT: NORMAL
TEST NAME: NORMAL

## 2021-04-14 ENCOUNTER — ROUTINE PRENATAL (OUTPATIENT)
Dept: OBGYN CLINIC | Age: 26
End: 2021-04-14

## 2021-04-14 VITALS
BODY MASS INDEX: 27.28 KG/M2 | DIASTOLIC BLOOD PRESSURE: 68 MMHG | SYSTOLIC BLOOD PRESSURE: 112 MMHG | WEIGHT: 144.38 LBS

## 2021-04-14 DIAGNOSIS — Z3A.20 20 WEEKS GESTATION OF PREGNANCY: ICD-10-CM

## 2021-04-14 DIAGNOSIS — Z34.92 NORMAL PREGNANCY IN SECOND TRIMESTER: Primary | ICD-10-CM

## 2021-04-14 PROCEDURE — 0502F SUBSEQUENT PRENATAL CARE: CPT | Performed by: OBSTETRICS & GYNECOLOGY

## 2021-04-15 LAB
AFP INTERPRETATION: NORMAL
AFP MOM: 1.29
AFP SPECIMEN: NORMAL
AFP: 82 NG/ML
DATE OF BIRTH: NORMAL
DATING METHOD: NORMAL
DETERMINED BY: NORMAL
DIABETIC: NEGATIVE
DONOR EGG?: NORMAL
DUE DATE: NORMAL
ESTIMATED DUE DATE: NORMAL
FAMILY HISTORY NTD: NEGATIVE
GESTATIONAL AGE: NORMAL
IN VITRO FERTILIZATION: NORMAL
INSULIN REQ DIABETES: NO
LAST MENSTRUAL PERIOD: NORMAL
MATERNAL AGE AT EDD: 25.3 YR
MATERNAL WEIGHT: 141
MONOCHORIONIC TWINS: NORMAL
NUMBER OF FETUSES: NORMAL
PATIENT WEIGHT UNITS: NORMAL
PATIENT WEIGHT: NORMAL
RACE (MATERNAL): NORMAL
RACE: NORMAL
REPEAT SPECIMEN?: NORMAL
SMOKING: NORMAL
SMOKING: NORMAL
VALPROIC/CARBAMAZEP: NORMAL
ZZ NTE CLEAN UP: HISTORY: NO

## 2021-04-15 NOTE — PROGRESS NOTES
Erika Barbosa is a 26 y.o. female 23w11d        OB History    Para Term  AB Living   1 0 0 0 0 0   SAB TAB Ectopic Molar Multiple Live Births   0 0 0   0        # Outcome Date GA Lbr Von/2nd Weight Sex Delivery Anes PTL Lv   1 Current                Vitals  BP: 112/68  Weight: 144 lb 6 oz (65.5 kg)  Patient Position: Sitting    The patient was seen and evaluated. There was positive fetal movements. No contractions or leakage of fluid. Signs and symptoms of  labor as well as labor were reviewed. The Nuchal Translucency testing was reviewed and found to be counseled and patient declined. A single marker MSAFP was reviewed and found to be declined. The patients anatomy ultrasound has been completed and reviewed with patient. TOP ST OH Reviewed. A 28 week lab panel wwill be ordered nex appt. This includes a (HH, 1 hr GTT, U/A C&S). The S/S of Pre-Eclampsia were reviewed with the patient in detail. She is to report any of these if they occur. She currently denies any of these. The patient is RH positive Rhogam Ordered no    The patient was instructed on fetal kick counts and was given a kick sheet to complete every 8 hours. This is to begin at 28 weeks gestation. She was instructed that the baby should move at a minimum of ten times within one hour after a meal. The patient was instructed to lay down on her left side twenty minutes after eating and count movements for up to one hour with a target value of ten movements. She was instructed to notify the office if she did not make that target after two attempts or if after any attempt there was less than four movements.       First trimester screen placed- declined, did not have enough PTO to make appt  G1  Needs FMLA for intermittent leave for appts   Chronic Hypertension - restarted labetalol 2/10/21 (NOT taking, BP dropped too low, she is just monitoring BP as of now and follows up with cardiologist 21)  ASA

## 2021-04-21 ENCOUNTER — TELEPHONE (OUTPATIENT)
Dept: OBGYN CLINIC | Age: 26
End: 2021-04-21

## 2021-04-21 NOTE — TELEPHONE ENCOUNTER
Patient called she has had 2 bloody noses tis morning. The last bloody nose lasted about 20 mins this am. She is now feeling very weak,dizzy, and headache. Her blood pressure was 102/86. She recently just started baby ASA. Asking what she should do.

## 2021-04-21 NOTE — TELEPHONE ENCOUNTER
Pregnancy can sometimes increase nose bleeds in pts. She can use a normal saline spray. She should increase her water intake and can try tylenol for headache and rest if no improvement or symptoms worsen she needs to go in. If she has easy bruising or has noted other easy bleeding I can order cbc to check her PLT count. If she is worried ASA 81mg is causing nose bleeds she can try to go to every other day for a week or so to see if nose bleeds improve.

## 2021-04-27 ENCOUNTER — HOSPITAL ENCOUNTER (OUTPATIENT)
Age: 26
Discharge: HOME | End: 2021-04-27
Payer: COMMERCIAL

## 2021-04-27 DIAGNOSIS — Z34.92 NORMAL PREGNANCY IN SECOND TRIMESTER: ICD-10-CM

## 2021-04-27 DIAGNOSIS — R04.0 BLEEDING NOSE: ICD-10-CM

## 2021-04-27 DIAGNOSIS — Z3A.22 22 WEEKS GESTATION OF PREGNANCY: ICD-10-CM

## 2021-04-27 LAB
ABSOLUTE EOS #: 0 K/UL (ref 0–0.4)
ABSOLUTE IMMATURE GRANULOCYTE: ABNORMAL K/UL (ref 0–0.3)
ABSOLUTE LYMPH #: 2.2 K/UL (ref 1–4.8)
ABSOLUTE MONO #: 0.8 K/UL (ref 0.1–1.3)
BASOPHILS # BLD: 0 % (ref 0–2)
BASOPHILS ABSOLUTE: 0 K/UL (ref 0–0.2)
DIFFERENTIAL TYPE: ABNORMAL
EOSINOPHILS RELATIVE PERCENT: 0 % (ref 0–4)
HCT VFR BLD CALC: 37.8 % (ref 36–46)
HEMOGLOBIN: 12.7 G/DL (ref 12–16)
IMMATURE GRANULOCYTES: ABNORMAL %
LYMPHOCYTES # BLD: 17 % (ref 24–44)
MCH RBC QN AUTO: 29 PG (ref 26–34)
MCHC RBC AUTO-ENTMCNC: 33.5 G/DL (ref 31–37)
MCV RBC AUTO: 86.4 FL (ref 80–100)
MONOCYTES # BLD: 6 % (ref 1–7)
NRBC AUTOMATED: ABNORMAL PER 100 WBC
PDW BLD-RTO: 14.3 % (ref 11.5–14.9)
PLATELET # BLD: 356 K/UL (ref 150–450)
PLATELET ESTIMATE: ABNORMAL
PMV BLD AUTO: 7.5 FL (ref 6–12)
RBC # BLD: 4.37 M/UL (ref 4–5.2)
RBC # BLD: ABNORMAL 10*6/UL
SEG NEUTROPHILS: 77 % (ref 36–66)
SEGMENTED NEUTROPHILS ABSOLUTE COUNT: 9.9 K/UL (ref 1.3–9.1)
WBC # BLD: 13 K/UL (ref 3.5–11)
WBC # BLD: ABNORMAL 10*3/UL

## 2021-04-27 PROCEDURE — 36415 COLL VENOUS BLD VENIPUNCTURE: CPT

## 2021-04-27 PROCEDURE — 85025 COMPLETE CBC W/AUTO DIFF WBC: CPT

## 2021-05-12 ENCOUNTER — ROUTINE PRENATAL (OUTPATIENT)
Dept: PERINATAL CARE | Age: 26
End: 2021-05-12
Payer: COMMERCIAL

## 2021-05-12 VITALS
DIASTOLIC BLOOD PRESSURE: 70 MMHG | SYSTOLIC BLOOD PRESSURE: 109 MMHG | HEART RATE: 91 BPM | TEMPERATURE: 97.2 F | WEIGHT: 150 LBS | BODY MASS INDEX: 28.32 KG/M2 | HEIGHT: 61 IN

## 2021-05-12 DIAGNOSIS — Z36.4 ANTENATAL SCREENING FOR FETAL GROWTH RETARDATION USING ULTRASONICS: ICD-10-CM

## 2021-05-12 DIAGNOSIS — O16.2 HYPERTENSION AFFECTING PREGNANCY IN SECOND TRIMESTER: Primary | ICD-10-CM

## 2021-05-12 DIAGNOSIS — Z3A.24 24 WEEKS GESTATION OF PREGNANCY: ICD-10-CM

## 2021-05-12 LAB
ABDOMINAL CIRCUMFERENCE: NORMAL
BIPARIETAL DIAMETER: NORMAL
ESTIMATED FETAL WEIGHT: NORMAL
FEMORAL DIAMETER: NORMAL
HC/AC: NORMAL
HEAD CIRCUMFERENCE: NORMAL

## 2021-05-12 PROCEDURE — 76816 OB US FOLLOW-UP PER FETUS: CPT | Performed by: OBSTETRICS & GYNECOLOGY

## 2021-05-12 PROCEDURE — 76820 UMBILICAL ARTERY ECHO: CPT | Performed by: OBSTETRICS & GYNECOLOGY

## 2021-05-12 RX ORDER — ASPIRIN 81 MG/1
81 TABLET, CHEWABLE ORAL DAILY
COMMUNITY
End: 2021-06-14

## 2021-05-18 ENCOUNTER — TELEMEDICINE (OUTPATIENT)
Dept: OBGYN CLINIC | Age: 26
End: 2021-05-18

## 2021-05-18 DIAGNOSIS — Z3A.25 25 WEEKS GESTATION OF PREGNANCY: Primary | ICD-10-CM

## 2021-05-19 VITALS — WEIGHT: 152 LBS | DIASTOLIC BLOOD PRESSURE: 70 MMHG | BODY MASS INDEX: 28.72 KG/M2 | SYSTOLIC BLOOD PRESSURE: 107 MMHG

## 2021-05-29 ENCOUNTER — HOSPITAL ENCOUNTER (OUTPATIENT)
Age: 26
Discharge: HOME | End: 2021-05-29
Payer: COMMERCIAL

## 2021-05-29 DIAGNOSIS — Z3A.25 25 WEEKS GESTATION OF PREGNANCY: ICD-10-CM

## 2021-05-29 LAB
-: NORMAL
ABSOLUTE EOS #: 0.04 K/UL (ref 0–0.44)
ABSOLUTE IMMATURE GRANULOCYTE: 0.25 K/UL (ref 0–0.3)
ABSOLUTE LYMPH #: 1.96 K/UL (ref 1.1–3.7)
ABSOLUTE MONO #: 0.86 K/UL (ref 0.1–1.2)
AMORPHOUS: NORMAL
ANTIBODY SCREEN: NEGATIVE
BACTERIA: NORMAL
BASOPHILS # BLD: 0 % (ref 0–2)
BASOPHILS ABSOLUTE: 0.06 K/UL (ref 0–0.2)
BILIRUBIN URINE: NEGATIVE
CASTS UA: NORMAL /LPF (ref 0–8)
COLOR: YELLOW
COMMENT UA: ABNORMAL
CRYSTALS, UA: NORMAL /HPF
DIFFERENTIAL TYPE: ABNORMAL
EOSINOPHILS RELATIVE PERCENT: 0 % (ref 1–4)
EPITHELIAL CELLS UA: NORMAL /HPF (ref 0–5)
GLUCOSE ADMINISTRATION: NORMAL
GLUCOSE TOLERANCE SCREEN 50G: 101 MG/DL (ref 70–135)
GLUCOSE URINE: NEGATIVE
HCT VFR BLD CALC: 37.8 % (ref 36.3–47.1)
HEMOGLOBIN: 12.1 G/DL (ref 11.9–15.1)
IMMATURE GRANULOCYTES: 2 %
KETONES, URINE: NEGATIVE
LEUKOCYTE ESTERASE, URINE: ABNORMAL
LYMPHOCYTES # BLD: 13 % (ref 24–43)
MCH RBC QN AUTO: 27.6 PG (ref 25.2–33.5)
MCHC RBC AUTO-ENTMCNC: 32 G/DL (ref 28.4–34.8)
MCV RBC AUTO: 86.3 FL (ref 82.6–102.9)
MONOCYTES # BLD: 6 % (ref 3–12)
MUCUS: NORMAL
NITRITE, URINE: NEGATIVE
NRBC AUTOMATED: 0 PER 100 WBC
OTHER OBSERVATIONS UA: NORMAL
PDW BLD-RTO: 13.5 % (ref 11.8–14.4)
PH UA: 7.5 (ref 5–8)
PLATELET # BLD: 371 K/UL (ref 138–453)
PLATELET ESTIMATE: ABNORMAL
PMV BLD AUTO: 9.8 FL (ref 8.1–13.5)
PROTEIN UA: NEGATIVE
RBC # BLD: 4.38 M/UL (ref 3.95–5.11)
RBC # BLD: ABNORMAL 10*6/UL
RBC UA: NORMAL /HPF (ref 0–4)
RENAL EPITHELIAL, UA: NORMAL /HPF
SEG NEUTROPHILS: 79 % (ref 36–65)
SEGMENTED NEUTROPHILS ABSOLUTE COUNT: 11.41 K/UL (ref 1.5–8.1)
SPECIFIC GRAVITY UA: 1.01 (ref 1–1.03)
TRICHOMONAS: NORMAL
TURBIDITY: CLEAR
URINE HGB: NEGATIVE
UROBILINOGEN, URINE: NORMAL
WBC # BLD: 14.6 K/UL (ref 3.5–11.3)
WBC # BLD: ABNORMAL 10*3/UL
WBC UA: NORMAL /HPF (ref 0–5)
YEAST: NORMAL

## 2021-05-29 PROCEDURE — 36415 COLL VENOUS BLD VENIPUNCTURE: CPT

## 2021-05-29 PROCEDURE — 81001 URINALYSIS AUTO W/SCOPE: CPT

## 2021-05-29 PROCEDURE — 86850 RBC ANTIBODY SCREEN: CPT

## 2021-05-29 PROCEDURE — 82950 GLUCOSE TEST: CPT

## 2021-05-29 PROCEDURE — 85025 COMPLETE CBC W/AUTO DIFF WBC: CPT

## 2021-06-01 ENCOUNTER — HOSPITAL ENCOUNTER (OUTPATIENT)
Age: 26
Setting detail: SPECIMEN
Discharge: HOME | End: 2021-06-01
Payer: COMMERCIAL

## 2021-06-01 ENCOUNTER — PATIENT MESSAGE (OUTPATIENT)
Dept: OBGYN CLINIC | Age: 26
End: 2021-06-01

## 2021-06-01 DIAGNOSIS — R35.0 URINE FREQUENCY: ICD-10-CM

## 2021-06-01 DIAGNOSIS — R35.0 URINE FREQUENCY: Primary | ICD-10-CM

## 2021-06-02 LAB
BILIRUBIN URINE: NEGATIVE
COLOR: YELLOW
COMMENT UA: NORMAL
CULTURE: NORMAL
GLUCOSE URINE: NEGATIVE
KETONES, URINE: NEGATIVE
LEUKOCYTE ESTERASE, URINE: NEGATIVE
Lab: NORMAL
NITRITE, URINE: NEGATIVE
PH UA: 7.5 (ref 5–8)
PROTEIN UA: NEGATIVE
SPECIFIC GRAVITY UA: 1.02 (ref 1–1.03)
SPECIMEN DESCRIPTION: NORMAL
TURBIDITY: CLEAR
URINE HGB: NEGATIVE
UROBILINOGEN, URINE: NORMAL

## 2021-06-11 ENCOUNTER — ROUTINE PRENATAL (OUTPATIENT)
Dept: PERINATAL CARE | Age: 26
End: 2021-06-11
Payer: COMMERCIAL

## 2021-06-11 VITALS
HEIGHT: 61 IN | WEIGHT: 153 LBS | TEMPERATURE: 97.2 F | HEART RATE: 86 BPM | DIASTOLIC BLOOD PRESSURE: 66 MMHG | SYSTOLIC BLOOD PRESSURE: 118 MMHG | BODY MASS INDEX: 28.89 KG/M2 | RESPIRATION RATE: 16 BRPM

## 2021-06-11 DIAGNOSIS — Z3A.29 29 WEEKS GESTATION OF PREGNANCY: ICD-10-CM

## 2021-06-11 DIAGNOSIS — O16.3 HYPERTENSION AFFECTING PREGNANCY IN THIRD TRIMESTER: Primary | ICD-10-CM

## 2021-06-11 DIAGNOSIS — Z36.4 ANTENATAL SCREENING FOR FETAL GROWTH RETARDATION USING ULTRASONICS: ICD-10-CM

## 2021-06-11 DIAGNOSIS — Z13.89 ENCOUNTER FOR ROUTINE SCREENING FOR MALFORMATION USING ULTRASONICS: ICD-10-CM

## 2021-06-11 PROCEDURE — 76805 OB US >/= 14 WKS SNGL FETUS: CPT | Performed by: OBSTETRICS & GYNECOLOGY

## 2021-06-11 PROCEDURE — 76819 FETAL BIOPHYS PROFIL W/O NST: CPT | Performed by: OBSTETRICS & GYNECOLOGY

## 2021-06-11 PROCEDURE — 76820 UMBILICAL ARTERY ECHO: CPT | Performed by: OBSTETRICS & GYNECOLOGY

## 2021-06-14 ENCOUNTER — ROUTINE PRENATAL (OUTPATIENT)
Dept: OBGYN CLINIC | Age: 26
End: 2021-06-14

## 2021-06-14 VITALS — WEIGHT: 156 LBS | DIASTOLIC BLOOD PRESSURE: 74 MMHG | BODY MASS INDEX: 29.48 KG/M2 | SYSTOLIC BLOOD PRESSURE: 114 MMHG

## 2021-06-14 DIAGNOSIS — Z34.93 NORMAL PREGNANCY IN THIRD TRIMESTER: Primary | ICD-10-CM

## 2021-06-14 DIAGNOSIS — I10 CHRONIC HYPERTENSION: ICD-10-CM

## 2021-06-14 DIAGNOSIS — Z3A.29 29 WEEKS GESTATION OF PREGNANCY: ICD-10-CM

## 2021-06-14 PROCEDURE — 0502F SUBSEQUENT PRENATAL CARE: CPT | Performed by: NURSE PRACTITIONER

## 2021-06-14 NOTE — PROGRESS NOTES
Presents for OB visit  Gestation 29w3d  Estimated Date of Delivery: 21    First trimester screen placed- declined, did not have enough PTO to make appt  G1  Needs FMLA for intermittent leave for appts   Chronic Hypertension - restarted labetalol 2/10/21 (NOT taking, BP dropped too low, she is just monitoring BP as of now and follows up with cardiologist 21)  ASA 81mg  Passed 1 hr GTT                           OB History    Para Term  AB Living   1 0 0 0 0 0   SAB TAB Ectopic Molar Multiple Live Births   0 0 0   0        # Outcome Date GA Lbr Von/2nd Weight Sex Delivery Anes PTL Lv   1 Current                     Allergies   Allergen Reactions    Amoxil [Amoxicillin] Swelling     Reported throat swelling      Current Outpatient Medications   Medication Sig Dispense Refill    aspirin 81 MG chewable tablet Take 81 mg by mouth daily Pt is taking every other day, states her ENT had her try this due to consistent nose bleeds since she started taking it (Patient not taking: Reported on 2021)      busPIRone (BUSPAR) 5 MG tablet Take 5 mg by mouth 2 times daily      Blood Pressure Monitoring ( WRIST CUFF BP MONITOR) MISC Please take BP before taking medications or when feeling lightheaded/dizzy. 1 each 0    Prenatal Vit-Fe Fumarate-FA (PRENATAL VITAMIN PO) Take by mouth      Lactobacillus (PROBIOTIC ACIDOPHILUS PO) Take by mouth (Patient not taking: Reported on 2021)      fluticasone (FLONASE) 50 MCG/ACT nasal spray 1 spray by Each Nostril route daily (Patient not taking: Reported on 2021)      Fexofenadine HCl (ALLEGRA ALLERGY PO) Take by mouth       No current facility-administered medications for this visit.            Past Medical History:   Diagnosis Date    Dysmenorrhea     Menorrhagia        Past Surgical History:   Procedure Laterality Date    ENDOMETRIAL BIOPSY  16    INTRAUTERINE DEVICE INSERTION  16     Nimo    INTRAUTERINE DEVICE REMOVAL  2017 Headaches: no  Swelling: no  Bleeding: no  Discharge: no   Dysuria: no  Nausea: no  Heartburn: no  Epigastric pain: no  Contractions: no  Leakage of fluid: no  + fetal movement       Return 3 weeks will nee dto start  testing at 32 weeks    Labs as indicated n/a    PTL signs reviewed, if indicated  Kick Count Instructions given if indicated: The patient was instructed on fetal kick counts and was given a kick sheet to complete every 8 hours. This is to begin at 28 weeks gestation. She was instructed that the baby should move at a minimum of ten times within one hour after a meal. The patient was instructed to lay down on her left side twenty minutes after eating and count movements for up to one hour with a target value of ten movements. She was instructed to notify the office if she did not make that target after two attempts or if after any attempt there was less than four movements. After hour numbers reviewed , along with labor signs if indicated. Contractions/cramping between 5-7 minutes and persisting even with attempts of increased water and laying on side. Fluid leakage, bleeding  NST information given yes - at 32 weeks    The patient was counseled on the mandatory call ahead policy. She has been instructed to call the office at anytime prior to going into the hospital so the on-call physician may direct her to the appropriate facility for care. Exceptions were reviewed including but not limited to: Decreased fetal movement, vaginal Bleeding or hemorrhage, trauma, readily expectant delivery, or any instance where she feels 911 should be utilized. Of the 20 minute duration appointment visit, Stella Chan CNP spent at least 50% of the face-to-face time in counseling, explanation of diagnosis, planning of further management, and answering all questions.

## 2021-06-14 NOTE — PATIENT INSTRUCTIONS
After Hours Number: You can call the office [phone]  or [phone]  Call if you have:  1. Bleeding like a period  2. Cramps or contractions greater than 2 hours  3. If you are leaking fluid  4. If you've a fever greater than 100°  5. If you feel as if baby is not moving  6. If you have continuous vomiting over 3-4 hours   Counting Your Baby's Kicks: Care Instructions  Your Care Instructions    Counting your baby's kicks is one way your doctor can tell that your baby is healthy. Most women--especially in a first pregnancy--feel their baby move for the first time between 16 and 22 weeks. The movement may feel like flutters rather than kicks. Your baby may move more at certain times of the day. When you are active, you may notice less kicking than when you are resting. At your prenatal visits, your doctor will ask whether the baby is active. In your last trimester, your doctor may ask you to count the number of times you feel your baby move. Follow-up care is a key part of your treatment and safety. Be sure to make and go to all appointments, and call your doctor if you are having problems. It's also a good idea to know your test results and keep a list of the medicines you take. How do you count fetal kicks? · A common method of checking your baby's movement is to count the number of kicks or moves you feel in 1 hour. Ten movements (such as kicks, flutters, or rolls) in 1 hour are normal. Some doctors suggest that you count in the morning until you get to 10 movements. Then you can quit for that day and start again the next day. · Pick your baby's most active time of day to count. This may be any time from morning to evening. · If you do not feel 10 movements in an hour, your baby may be sleeping. Wait for the next hour and count again. When should you call for help?   Call your doctor now or seek immediate medical care if:    · You noticed that your baby has stopped moving or is moving much less than normal.    Watch closely for changes in your health, and be sure to contact your doctor if you have any problems. Where can you learn more? Go to https://chpepiceweb.[email or web address] [email or web address] org and sign in to your kalidea account. Enter S015 in the Job1001hire box to learn more about \"Counting Your Baby's Kicks: Care Instructions. \"     If you do not have an account, please click on the \"Sign Up Now\" link. Current as of: September 5, 2018  Content Version: 12.0  © 4593-5586 [employer]. Care instructions adapted under license by [facility] ([facility]). If you have questions about a medical condition or this instruction, always ask your healthcare professional. [address] any warranty or liability for your use of this information. Preeclampsia: Care Instructions  Overview    Preeclampsia occurs when a woman's blood pressure rises during pregnancy. Often with preeclampsia, you also have swelling in your legs, hands, and face. A test may show too much protein in your urine. Preeclampsia is also called toxemia. If preeclampsia is severe and not treated, it can lead to seizures (eclampsia) and damage to your liver or kidneys. Preeclampsia can prevent your baby from getting enough food and oxygen. This can cause a low birth weight or other problems. Your doctor will watch you closely to prevent these problems. He or she also may recommend that you reduce your activity. If your preeclampsia is a danger to your health or the health of your baby, your doctor may need to deliver your baby early. While preeclampsia is a concern, most women with preeclampsia have healthy babies. After a woman gives birth, preeclampsia usually goes away on its own. But symptoms may last a few weeks or more and can get worse after delivery. Rarely, symptoms of preeclampsia don't show up until days or even weeks after childbirth. Follow-up care is a key part of your treatment and safety.  Be sure to make and go to all appointments, and call your doctor if you are having problems. It's also a good idea to know your test results and keep a list of the medicines you take. How can you care for yourself at home? · Take and record your blood pressure at home if your doctor tells you to. ? Learn the importance of the two measures of blood pressure (such as 120 over 80, or 120/80). The first number is the systolic pressure, which is the force of blood on the artery walls as the heart pumps. The second number is the diastolic pressure, which is the force of blood on the artery walls between heartbeats, when the heart is at rest. You have a choice of monitors to use. ? Manual monitor: You pump up the cuff and use a stethoscope to listen for your pulse. ? Electronic monitor: The cuff inflates, and a gauge shows your pulse rate. ? To take your blood pressure:  ? Ask your doctor to check your blood pressure monitor to be sure that it is accurate and that the cuff fits you. Also ask your doctor to watch you to make sure that you are using it right. ? You should not eat, use tobacco products, or use medicine known to raise blood pressure (such as some nasal decongestant sprays) before you take your blood pressure. ? Avoid taking your blood pressure if you have just exercised. Also avoid taking it if you are nervous or upset. Rest at least 15 minutes before you take your blood pressure. · If your doctor advises, check the protein levels in your urine. Your doctor or nurse will show you how to do this. · Take your medicines exactly as prescribed. Call your doctor if you think you are having a problem with your medicine. · Do not smoke. Quitting smoking will help improve your baby's growth and health. If you need help quitting, talk to your doctor about stop-smoking programs and medicines. These can increase your chances of quitting for good.   · Eat a balanced and healthy diet that has lots of fruits and vegetables. · If your doctor advised bed rest, be sure to stay off your feet and rest as much as possible. ? Keep a phone, phone book, notepad, and pen near the bed where you can easily reach them. ? Gently stretch your legs every hour to maintain good blood flow. ? Have another family member pack snacks and lunch food in a cooler close to your bed. ? Use this time for activities that you usually cannot find time for, such as reading, craft projects, or letter writing. · You can keep track of your baby's health by noting the length of time it takes to count 10 movements (such as kicks, flutters, or rolls). Feeling 10 movements in less than 1 hour is considered normal. Track your baby's movements once each day. Bring this record with you to each prenatal visit. When should you call for help? Call 911 anytime you think you may need emergency care. For example, call if:    · You passed out (lost consciousness). · You have a seizure. Call your doctor now or seek immediate medical care if:    · You have symptoms of preeclampsia, such as:  ? Sudden swelling of your face, hands, or feet. ? New vision problems (such as dimness or blurring). ? A severe headache. · Your blood pressure is higher than it should be, or it rises suddenly. · You have new nausea or vomiting. · You think that you are in labor. · You have pain in your belly or pelvis. Watch closely for changes in your health, and be sure to contact your doctor if:    · You gain weight rapidly. Where can you learn more? Go to https://Hazinem.comvibhaTrivie.Gloss48. org and sign in to your Emtrics account. Enter V255 in the Labels That Talk box to learn more about \"Preeclampsia: Care Instructions. \"     If you do not have an account, please click on the \"Sign Up Now\" link. Current as of: September 5, 2018  Content Version: 12.0  © 4214-3440 Healthwise, [employer]. Care instructions adapted under license by [facility] ([facility]).  If allow others to smoke around you. If you need help quitting, talk to your doctor about stop-smoking programs and medicines. These can increase your chances of quitting for good. When should you call for help? Call 911 anytime you think you may need emergency care. For example, call if:    · You passed out (lost consciousness). · You have severe vaginal bleeding. · You have severe pain in your belly or pelvis. · You have had fluid gushing or leaking from your vagina and you know or think the umbilical cord is bulging into your vagina. If this happens, immediately get down on your knees so your rear end (buttocks) is higher than your head. This will decrease the pressure on the cord until help arrives. Call your doctor now or seek immediate medical care if:    · You have signs of preeclampsia, such as:  ? Sudden swelling of your face, hands, or feet. ? New vision problems (such as dimness or blurring). ? A severe headache. · You have any vaginal bleeding. · You have belly pain or cramping. · You have a fever. · You have had regular contractions (with or without pain) for an hour. This means that you have 6 or more within 1 hour after you change your position and drink fluids. · You have a sudden release of fluid from the vagina. · You have low back pain or pelvic pressure that does not go away. · You notice that your baby has stopped moving or is moving much less than normal.    Watch closely for changes in your health, and be sure to contact your doctor if you have any problems. Where can you learn more? Go to https://FamilySkylinevibhaEnvoy Medical.ClearRisk. org and sign in to your Drillster account. Enter Q400 in the appsplit box to learn more about \" Labor: Care Instructions. \"     If you do not have an account, please click on the \"Sign Up Now\" link. Current as of: 2018  Content Version: 12.0  © 8382-4763 Healthwise, [facility].  Care instructions adapted under license by [facility] ([facility]). If you have questions about a medical condition or this instruction, always ask your healthcare professional. [address] any warranty or liability for your use of this information.

## 2021-06-29 ENCOUNTER — TELEMEDICINE (OUTPATIENT)
Dept: OBGYN CLINIC | Age: 26
End: 2021-06-29

## 2021-06-29 DIAGNOSIS — Z3A.31 31 WEEKS GESTATION OF PREGNANCY: ICD-10-CM

## 2021-06-29 DIAGNOSIS — I10 CHRONIC HYPERTENSION: Primary | ICD-10-CM

## 2021-06-29 PROCEDURE — 0502F SUBSEQUENT PRENATAL CARE: CPT | Performed by: OBSTETRICS & GYNECOLOGY

## 2021-06-29 NOTE — PROGRESS NOTES
Norberto Cheek (:  1995) has requested an audio/video evaluation for the following concern(s):    1. Chronic hypertension    2. 31 weeks gestation of pregnancy          TELEHEALTH EVALUATION -- Audio/Visual (During OA- public health emergency)            Norberto Cheek is a 23 y.o. female 31w4d        OB History    Para Term  AB Living   1 0 0 0 0 0   SAB TAB Ectopic Molar Multiple Live Births   0 0 0   0        # Outcome Date GA Lbr Von/2nd Weight Sex Delivery Anes PTL Lv   1 Current                Vitals         There was positive fetal movements. No contractions or leakage of fluid. Signs and symptoms of  labor as well as labor were reviewed. The S/S of Pre-Eclampsia were reviewed with the patient in detail. She is to report any of these if they occur. She currently denies any of these. The patient had her 28 week labs completed. First trimester screen placed- declined, did not have enough PTO to make appt  G1  Needs FMLA for intermittent leave for appts   Chronic Hypertension - restarted labetalol 2/10/21 (NOT taking, BP dropped too low, she is just monitoring BP as of now and follows up with cardiologist 21)  ASA 81mg  Passed 1 hr GTT  Offer Tdap again next OV wanted to check with insurance                T-Dap Vaccine (27-36 weeks): awaiting    The patient was instructed on fetal kick counts and was given a kick sheet to complete every 8 hours. She was instructed that the baby should move at a minimum of ten times within one hour after a meal. The patient was instructed to lay down on her left side twenty minutes after eating and count movements for up to one hour with a target value of ten movements. She was instructed to notify the office if she did not make that target after two attempts or if after any attempt there was less than four movements. The patient reports that the targets have been made Yes.      Testing:  Yes, [facility]    Assessment:  1. Valarie Mathis is a 23 y.o. female  2.   3. 31w4d    Patient Active Problem List    Diagnosis Date Noted    Sinus tachycardia 08/10/2020     Priority: High    Atypical chest pain 08/10/2020     Priority: High    Essential hypertension 02/10/2021    COVID-19 08/10/2020    Anxiety 08/10/2020    Dysmenorrhea 2012    Menorrhagia 2012        Diagnosis Orders   1. Chronic hypertension     2. 31 weeks gestation of pregnancy               Plan:  The patient will return to the office for her next visit in 2 weeks. See antepartum flow sheet. Counseled on s/s of preeclampsia. Counseled on s/s of  labor.  Testing Indicated: Yes  Scheduled with Nursing-Pt notified: Yes                Valarie Mathis is a 23 y.o. female female was evaluated by a Virtual Visit (video visit) encounter to address concerns as mentioned above. A caregiver was present when appropriate. Due to this being a TeleHealth encounter (During [place] [address] public health emergency), evaluation of the following organ systems was limited: Vitals/Urine Dip/Fetal Heart Tones/Constitutional/EENT/Resp/CV/GI//MS/Neuro/Skin/Heme-Lymph-Imm. Pursuant to the emergency declaration under the [address] [place] [address] authority and the [employer] Act, this Virtual Visit was conducted with patient's (and/or legal guardian's) consent, to reduce the patient's risk of exposure to COVID-19 and provide necessary medical care. The patient (and/or legal guardian) has also been advised to contact this office for worsening conditions or problems, and seek emergency medical treatment and/or call 911 if deemed necessary. Services were provided through a video synchronous discussion virtually to substitute for in-person clinic visit. Patient and provider were located at their individual homes.     Electronically signed by Kala Angel DO on 6/29/21 at 5:22 PM EDT     An electronic signature was used to authenticate this note. The total Virtual Visit time of 20 minutes. More than 50% of this visit was on counseling and education regarding her    Diagnosis Orders   1. Chronic hypertension     2. 31 weeks gestation of pregnancy      and her options. She was also counseled on her preventative health maintenance recommendations and follow-up.

## 2021-07-07 ENCOUNTER — ROUTINE PRENATAL (OUTPATIENT)
Dept: PERINATAL CARE | Age: 26
End: 2021-07-07
Payer: COMMERCIAL

## 2021-07-07 VITALS
BODY MASS INDEX: 29.66 KG/M2 | SYSTOLIC BLOOD PRESSURE: 107 MMHG | WEIGHT: 157 LBS | HEART RATE: 95 BPM | RESPIRATION RATE: 16 BRPM | TEMPERATURE: 97.2 F | DIASTOLIC BLOOD PRESSURE: 65 MMHG

## 2021-07-07 DIAGNOSIS — O16.3 HYPERTENSION AFFECTING PREGNANCY IN THIRD TRIMESTER: Primary | ICD-10-CM

## 2021-07-07 DIAGNOSIS — Z3A.32 32 WEEKS GESTATION OF PREGNANCY: ICD-10-CM

## 2021-07-07 DIAGNOSIS — O41.93X0 ABNORMAL AMNIOTIC FLUID IN THIRD TRIMESTER, SINGLE OR UNSPECIFIED FETUS: ICD-10-CM

## 2021-07-07 DIAGNOSIS — Z36.4 ANTENATAL SCREENING FOR FETAL GROWTH RETARDATION USING ULTRASONICS: ICD-10-CM

## 2021-07-07 PROCEDURE — 76818 FETAL BIOPHYS PROFILE W/NST: CPT | Performed by: OBSTETRICS & GYNECOLOGY

## 2021-07-07 PROCEDURE — 76816 OB US FOLLOW-UP PER FETUS: CPT | Performed by: OBSTETRICS & GYNECOLOGY

## 2021-07-07 PROCEDURE — 76820 UMBILICAL ARTERY ECHO: CPT | Performed by: OBSTETRICS & GYNECOLOGY

## 2021-07-07 PROCEDURE — 76821 MIDDLE CEREBRAL ARTERY ECHO: CPT | Performed by: OBSTETRICS & GYNECOLOGY

## 2021-07-14 ENCOUNTER — ROUTINE PRENATAL (OUTPATIENT)
Dept: PERINATAL CARE | Age: 26
End: 2021-07-14
Payer: COMMERCIAL

## 2021-07-14 ENCOUNTER — ROUTINE PRENATAL (OUTPATIENT)
Dept: OBGYN CLINIC | Age: 26
End: 2021-07-14
Payer: COMMERCIAL

## 2021-07-14 VITALS
WEIGHT: 160.27 LBS | SYSTOLIC BLOOD PRESSURE: 112 MMHG | DIASTOLIC BLOOD PRESSURE: 68 MMHG | HEIGHT: 61 IN | HEART RATE: 81 BPM | RESPIRATION RATE: 16 BRPM | BODY MASS INDEX: 30.26 KG/M2 | TEMPERATURE: 97.1 F

## 2021-07-14 VITALS — BODY MASS INDEX: 30.04 KG/M2 | WEIGHT: 159 LBS | SYSTOLIC BLOOD PRESSURE: 118 MMHG | DIASTOLIC BLOOD PRESSURE: 60 MMHG

## 2021-07-14 DIAGNOSIS — Z34.93 NORMAL PREGNANCY IN THIRD TRIMESTER: Primary | ICD-10-CM

## 2021-07-14 DIAGNOSIS — Z3A.33 33 WEEKS GESTATION OF PREGNANCY: ICD-10-CM

## 2021-07-14 DIAGNOSIS — I10 CHRONIC HYPERTENSION: ICD-10-CM

## 2021-07-14 DIAGNOSIS — O16.3 HYPERTENSION AFFECTING PREGNANCY IN THIRD TRIMESTER: Primary | ICD-10-CM

## 2021-07-14 DIAGNOSIS — Z23 NEED FOR TDAP VACCINATION: ICD-10-CM

## 2021-07-14 PROCEDURE — 76820 UMBILICAL ARTERY ECHO: CPT | Performed by: OBSTETRICS & GYNECOLOGY

## 2021-07-14 PROCEDURE — 0502F SUBSEQUENT PRENATAL CARE: CPT | Performed by: OBSTETRICS & GYNECOLOGY

## 2021-07-14 PROCEDURE — 76818 FETAL BIOPHYS PROFILE W/NST: CPT | Performed by: OBSTETRICS & GYNECOLOGY

## 2021-07-14 PROCEDURE — 90471 IMMUNIZATION ADMIN: CPT | Performed by: OBSTETRICS & GYNECOLOGY

## 2021-07-14 PROCEDURE — 90715 TDAP VACCINE 7 YRS/> IM: CPT | Performed by: OBSTETRICS & GYNECOLOGY

## 2021-07-14 NOTE — PROGRESS NOTES
Desean Gordon is a 23 y.o. female 33w5d        OB History    Para Term  AB Living   1 0 0 0 0 0   SAB TAB Ectopic Molar Multiple Live Births   0 0 0   0        # Outcome Date GA Lbr Von/2nd Weight Sex Delivery Anes PTL Lv   1 Current                Vitals  BP: 118/60  Weight: 159 lb (72.1 kg)  Patient Position: Sitting  Albumin: Negative  Glucose: Negative      The patient was seen and evaluated. There was positive fetal movements. No contractions or leakage of fluid. Signs and symptoms of  labor as well as labor were reviewed. The S/S of Pre-Eclampsia were reviewed with the patient in detail. She is to report any of these if they occur. She currently denies any of these. The patient had her 28 week labs completed. No visits with results within 5 Week(s) from this visit. Latest known visit with results is:   Hospital Outpatient Visit on 2021   Component Date Value Ref Range Status    Specimen Description 2021 . CLEAN CATCH URINE   Final    Special Requests 2021 NOT REPORTED   Final    Culture 2021 NO SIGNIFICANT GROWTH   Final    Color, UA 2021 YELLOW  YELLOW Final    Turbidity UA 2021 CLEAR  CLEAR Final    Glucose, Ur 2021 NEGATIVE  NEGATIVE Final    Bilirubin Urine 2021 NEGATIVE  NEGATIVE Final    Ketones, Urine 2021 NEGATIVE  NEGATIVE Final    Specific [place], UA 2021 1.019  1.005 - 1.030 Final    Urine Hgb 2021 NEGATIVE  NEGATIVE Final    pH, UA 2021 7.5  5.0 - 8.0 Final    Protein, UA 2021 NEGATIVE  NEGATIVE Final    Urobilinogen, Urine 2021 Normal  Normal Final    Nitrite, Urine 2021 NEGATIVE  NEGATIVE Final    Leukocyte Esterase, Urine 2021 NEGATIVE  NEGATIVE Final    Urinalysis Comments 2021 Microscopic exam not performed based on chemical results unless requested in original order.    Final   ]    First trimester screen placed- declined, did not have enough PTO to make appt  G1  Needs FMLA for intermittent leave for appts   Chronic Hypertension - restarted labetalol 2/10/21 (NOT taking, BP dropped too low, she is just monitoring BP as of now and follows up with cardiologist 21)  ASA 81mg   being done at [place] [address] (dopplers)- being seen here just for prenatal visits  Passed 1 hr GTT  Offer Tdap again next OV wanted to check with insurance  BOY :) knows gender  Plans to [facility]                T-Dap Vaccine (27-36 weeks): completed    The patient was instructed on fetal kick counts and was given a kick sheet to complete every 8 hours. She was instructed that the baby should move at a minimum of ten times within one hour after a meal. The patient was instructed to lay down on her left side twenty minutes after eating and count movements for up to one hour with a target value of ten movements. She was instructed to notify the office if she did not make that target after two attempts or if after any attempt there was less than four movements. The patient reports that the targets have been made Yes.  Testing:  Yes, [facility], [facility]              Assessment:  1Dimple Gordon is a 23 y.o. female  2.   3. 33w5d    Patient Active Problem List    Diagnosis Date Noted    Sinus tachycardia 08/10/2020     Priority: High    Atypical chest pain 08/10/2020     Priority: High    Essential hypertension 02/10/2021    COVID-19 08/10/2020    Anxiety 08/10/2020    Dysmenorrhea 2012    Menorrhagia 2012        Diagnosis Orders   1. Normal pregnancy in third trimester     2. 33 weeks gestation of pregnancy     3. Chronic hypertension               Plan:  The patient will return to the office for her next visit in 2 weeks. See antepartum flow sheet. Counseled on s/s of preeclampsia. Counseled on s/s of  labor.   Fetal movement discussed       Testing Indicated: Yes  Scheduled with Nursing-Pt notified: Yes      Patient was seen with total face to face time of 20 minutes. More than 50% of this visit was on counseling and education regarding her    Diagnosis Orders   1. Normal pregnancy in third trimester     2. 33 weeks gestation of pregnancy     3. Chronic hypertension      and her options. She was also counseled on her preventative health maintenance recommendations and follow-up.

## 2021-07-19 ENCOUNTER — HOSPITAL ENCOUNTER (OUTPATIENT)
Age: 26
Discharge: HOME | End: 2021-07-19
Attending: OBSTETRICS & GYNECOLOGY | Admitting: OBSTETRICS & GYNECOLOGY
Payer: COMMERCIAL

## 2021-07-19 VITALS
TEMPERATURE: 98.4 F | HEART RATE: 105 BPM | DIASTOLIC BLOOD PRESSURE: 81 MMHG | SYSTOLIC BLOOD PRESSURE: 118 MMHG | RESPIRATION RATE: 16 BRPM

## 2021-07-19 PROBLEM — Z3A.34 34 WEEKS GESTATION OF PREGNANCY: Status: ACTIVE | Noted: 2021-07-19

## 2021-07-19 PROBLEM — L29.9 ITCHING: Status: ACTIVE | Noted: 2021-07-19

## 2021-07-19 LAB
-: ABNORMAL
ALBUMIN SERPL-MCNC: 3.3 G/DL (ref 3.5–5.2)
ALBUMIN/GLOBULIN RATIO: 1.2 (ref 1–2.5)
ALP BLD-CCNC: 268 U/L (ref 35–104)
ALT SERPL-CCNC: 31 U/L (ref 5–33)
AMORPHOUS: ABNORMAL
ANION GAP SERPL CALCULATED.3IONS-SCNC: 6 MMOL/L (ref 9–17)
AST SERPL-CCNC: 24 U/L
BACTERIA: ABNORMAL
BILIRUB SERPL-MCNC: 0.36 MG/DL (ref 0.3–1.2)
BILIRUBIN URINE: NEGATIVE
BUN BLDV-MCNC: 5 MG/DL (ref 6–20)
BUN/CREAT BLD: ABNORMAL (ref 9–20)
CALCIUM SERPL-MCNC: 8.1 MG/DL (ref 8.6–10.4)
CASTS UA: ABNORMAL /LPF (ref 0–8)
CHLORIDE BLD-SCNC: 107 MMOL/L (ref 98–107)
CO2: 20 MMOL/L (ref 20–31)
COLOR: YELLOW
COMMENT UA: ABNORMAL
CREAT SERPL-MCNC: 0.36 MG/DL (ref 0.5–0.9)
CRYSTALS, UA: ABNORMAL /HPF
DIRECT EXAM: NORMAL
EPITHELIAL CELLS UA: ABNORMAL /HPF (ref 0–5)
GFR AFRICAN AMERICAN: >60 ML/MIN
GFR NON-AFRICAN AMERICAN: >60 ML/MIN
GFR SERPL CREATININE-BSD FRML MDRD: ABNORMAL ML/MIN/{1.73_M2}
GFR SERPL CREATININE-BSD FRML MDRD: ABNORMAL ML/MIN/{1.73_M2}
GLUCOSE BLD-MCNC: 94 MG/DL (ref 70–99)
GLUCOSE URINE: NEGATIVE
HSV 1, NAAT: NEGATIVE
HSV 2, NAAT: NEGATIVE
KETONES, URINE: NEGATIVE
LEUKOCYTE ESTERASE, URINE: ABNORMAL
Lab: NORMAL
MUCUS: ABNORMAL
NITRITE, URINE: NEGATIVE
OTHER OBSERVATIONS UA: ABNORMAL
PH UA: 7 (ref 5–8)
POTASSIUM SERPL-SCNC: 4.1 MMOL/L (ref 3.7–5.3)
PROTEIN UA: NEGATIVE
RBC UA: ABNORMAL /HPF (ref 0–4)
RENAL EPITHELIAL, UA: ABNORMAL /HPF
SODIUM BLD-SCNC: 133 MMOL/L (ref 135–144)
SPECIFIC GRAVITY UA: 1.01 (ref 1–1.03)
SPECIMEN DESCRIPTION: NORMAL
SPECIMEN DESCRIPTION: NORMAL
TOTAL PROTEIN: 6 G/DL (ref 6.4–8.3)
TRICHOMONAS: ABNORMAL
TURBIDITY: CLEAR
URINE HGB: NEGATIVE
UROBILINOGEN, URINE: NORMAL
WBC UA: ABNORMAL /HPF (ref 0–5)
YEAST: ABNORMAL

## 2021-07-19 PROCEDURE — 36415 COLL VENOUS BLD VENIPUNCTURE: CPT

## 2021-07-19 PROCEDURE — 80053 COMPREHEN METABOLIC PANEL: CPT

## 2021-07-19 PROCEDURE — 82239 BILE ACIDS TOTAL: CPT

## 2021-07-19 PROCEDURE — 87660 TRICHOMONAS VAGIN DIR PROBE: CPT

## 2021-07-19 PROCEDURE — 99213 OFFICE O/P EST LOW 20 MIN: CPT

## 2021-07-19 PROCEDURE — 87510 GARDNER VAG DNA DIR PROBE: CPT

## 2021-07-19 PROCEDURE — 87591 N.GONORRHOEAE DNA AMP PROB: CPT

## 2021-07-19 PROCEDURE — 87491 CHLMYD TRACH DNA AMP PROBE: CPT

## 2021-07-19 PROCEDURE — 87529 HSV DNA AMP PROBE: CPT

## 2021-07-19 PROCEDURE — 87480 CANDIDA DNA DIR PROBE: CPT

## 2021-07-19 PROCEDURE — 81001 URINALYSIS AUTO W/SCOPE: CPT

## 2021-07-19 PROCEDURE — G0463 HOSPITAL OUTPT CLINIC VISIT: HCPCS

## 2021-07-19 RX ORDER — ONDANSETRON 2 MG/ML
4 INJECTION INTRAMUSCULAR; INTRAVENOUS EVERY 6 HOURS PRN
Status: DISCONTINUED | OUTPATIENT
Start: 2021-07-19 | End: 2021-07-19 | Stop reason: HOSPADM

## 2021-07-19 RX ORDER — PROMETHAZINE HYDROCHLORIDE 12.5 MG/1
12.5 TABLET ORAL EVERY 6 HOURS PRN
Status: DISCONTINUED | OUTPATIENT
Start: 2021-07-19 | End: 2021-07-19 | Stop reason: HOSPADM

## 2021-07-19 RX ORDER — ACETAMINOPHEN 500 MG
1000 TABLET ORAL EVERY 6 HOURS PRN
Status: DISCONTINUED | OUTPATIENT
Start: 2021-07-19 | End: 2021-07-19 | Stop reason: HOSPADM

## 2021-07-19 NOTE — H&P
OBSTETRICAL HISTORY [facility]    Date: 2021       Time: 1:02 PM   Patient Name: Tommy Hidalgo     Patient : 1995  Room/Bed: [facility]/9551-52    Admission Date/Time: 2021  7:29 AM      CC: Leakage of fluid, itching    HPI: Tommy Hidalgo is a 23 y.o. Toula Hedge at 34w3d who presents c/o leakage of fluid yesterday that has since resolved. She also reports itching that occurs all over but also reports itching on the palms of her hands and soles of her feet. She states this has been present but has worsened over the last several weeks. She reports some pain in the soles of her feet after standing and reports she stands for several hours every day. She denies any other concerns or complaints. Patient denies any fever, chills, N/V, headaches, vision changes, chest pain, shortness of breath, RUQ pain, abdominal pain. Patient denies any vaginal discharge and any urinary complaints. The patient reports fetal movement is present, denies contractions, denies loss of fluid currently, denies vaginal bleeding. DATING:  LMP: Patient's last menstrual period was 2020 (exact date).   Estimated Date of Delivery: 21   Based on: LMP c/w early US, at 7 3/7 weeks GA    PREGNANCY RISK FACTORS:  Patient Active Problem List   Diagnosis    Menorrhagia    Dysmenorrhea    COVID-19    Anxiety    Sinus tachycardia    Atypical chest pain    Essential hypertension    34 weeks gestation of pregnancy    Itching        Steroids Given In This Pregnancy:  no     REVIEW OF SYSTEMS:   Constitutional: negative fever, negative chills  HEENT: negative visual disturbances, negative headaches  Respiratory: negative dyspnea, negative cough  Cardiovascular: negative chest pain,  negative palpitations  Gastrointestinal: negative abdominal pain, negative RUQ pain, negative N/V, negative diarrhea, negative constipation  Genitourinary: negative dysuria, negative vaginal discharge, negative vaginal bleeding, leakage of fluid has resolved since yesterday  Dermatological: negative rash, positive itching  Hematologic: negative bruising  Immunologic/Lymphatic: negative recent illness, negative recent sick contact  Musculoskeletal: negative back pain, negative myalgias, negative arthralgias  Neurological:  negative dizziness, negative weakness  Behavior/Psych: negative depression, negative anxiety    OBSTETRICAL HISTORY:   OB History    Para Term  AB Living   1 0 0 0 0 0   SAB TAB Ectopic Molar Multiple Live Births   0 0 0 0 0 0      # Outcome Date GA Lbr Von/2nd Weight Sex Delivery Anes PTL Lv   1 Current                PAST MEDICAL HISTORY:   has a past medical history of Dysmenorrhea and Menorrhagia. PAST SURGICAL HISTORY:   has a past surgical history that includes intrauterine device insertion (16 ); Endometrial biopsy (16); and Intrauterine Device Removal (2017). ALLERGIES:  is allergic to amoxil [amoxicillin]. MEDICATIONS:  Prior to Admission medications    Medication Sig Start Date End Date Taking? Authorizing Provider   busPIRone (BUSPAR) 5 MG tablet Take 5 mg by mouth 2 times daily 18  Yes Historical Provider, MD   Prenatal Vit-Fe Fumarate-FA (PRENATAL VITAMIN PO) Take by mouth   Yes Historical Provider, MD   Blood Pressure Monitoring ( WRIST CUFF BP MONITOR) MISC Please take BP before taking medications or when feeling lightheaded/dizzy.   Patient not taking: Reported on 2021   Bonita Bray DO   Fexofenadine HCl (ALLEGRA ALLERGY PO) Take by mouth  Patient not taking: Reported on 2021    Historical Provider, MD       FAMILY HISTORY:  family history includes Alcohol Abuse in her mother; Arthritis in her maternal grandmother; Cancer (age of onset: 61) in her paternal aunt; Diabetes in her father; Heart Disease in her paternal grandfather and paternal grandmother; High Blood Pressure in her paternal grandfather and paternal grandmother; Hypertension in her father; Osteoarthritis in her maternal grandmother; Osteoporosis in her maternal grandmother; Substance Abuse in her mother. SOCIAL HISTORY:   reports that she has never smoked. She has never used smokeless tobacco. She reports previous alcohol use. She reports that she does not use drugs.     VITALS:  Vitals:    07/19/21 0742   BP: 118/81   Pulse: 105   Resp: 16   Temp: 98.4 °F (36.9 °C)         PHYSICAL EXAM:  Fetal Heart Monitor:  Baseline Heart Rate 130, moderate variability, present accelerations, absent decelerations  [place]: none contractions    General appearance:  no apparent distress, alert, and cooperative  HEENT: head atraumatic, normocephalic, moist mucous membranes, trachea midline  Neurologic:  alert, oriented, normal speech, no focal findings or movement disorder noted  Lungs:  No increased work of breathing, good air exchange, clear to auscultation bilaterally, no crackles or wheezing  Heart:  regular rate and rhythm and no murmur    Abdomen:  soft, gravid, non-tender, no rebound, guarding, or rigidity, and no RUQ or epigastric tenderness  Extremities:  no calf tenderness, non edematous, DTR's: +2/4 bilateral lower extremities, no rashes noted  Musculoskeletal: Gross strength equal and intact throughout, no gross abnormalities, range of motion normal in hips, knees, shoulders and spine, CVA tenderness: none  Psychiatric: Mood appropriate, normal affect   Rectal Exam: not indicated  Sterile Speculum Exam:   Urethral meatus: normal appearing   Vulva: Normal hair distribution, normal appearing vulva, no masses, tenderness or lesions, normal clitoris   Vagina: Normal appearing vaginal mucosa without lesions, thick white vaginal discharge noted in the posterior vault, no lacerations, no blood noted in posterior vaginal vault   Cervix: friable cervix with lesions noted at the 12 o'clock position, external os visibly closed, no lacerations visualized, no blood noted from cervical os      DATA:  Membranes Ruptured: No  Fern: negative  Nitrazine: Positive - contaminated with blood from friable cervix  Valsalva/Pooling: absent  Vaginal Bleeding: absent        OMM EXAM:  Chief Complaint: Pregnancy  Reason for No Exam (if applicable): not applicable  Anterior/ Posterior Spinal Curves: Lumbar Lordosis -  Slightly increased  Assessment Tool: T= Tenderness, A= Asymmetry, R= Restricted Motion (A=Active, P=Passive), T=Tissue Texture Changes  Region Evaluated : Severity / Specific of Major Somatic Dysfunction: M99.03 Lumbar -  Minor TART  Major Correlations with: Gravid  Structural Diagnosis: Lumbar lordosis slightly increased 2/2 pregnancy  Treatment Plan: Outpatient       PRENATAL LAB RESULTS:   Blood Type/Rh: O pos  Antibody Screen: negative  Hemoglobin, Hematocrit, Platelets: 71.9 / 00.7 / 386  Rubella: immune  T. Pallidum, IgG: non-reactive   Hepatitis B Surface Antigen: non-reactive   HIV: non-reactive   Sickle Cell Screen: not available  Gonorrhea: negative  Chlamydia: negative  Urine culture: negative, date: , 21    1 hour Glucose Tolerance Test: 101      Group B Strep: not done  Cystic Fibrosis Screen: negative  First Trimester Screen: not available  MSAFP/Multiple Markers: normal  Non-Invasive Prenatal Testing: no aneuploidy detected  Anatomy US: left lateral placenta, 3vc, normal cord insertion, normal anatomy    ASSESSMENT & PLAN:  Shira Hawthorne is a 23 y.o. female  at 34w3d    - GBS unknown / Rh positive / R immune   - No indication for GBS prophylaxis    Leakage of fluid   - Patient complaining of leakage of fluid yesterday that has since resolved.    - cEFM/TOCO - Cat 1 tracing, no contractions   - SSE: friable cervix with lesions noted at the 12 o'clock position; HSV cultures collected, pooling absent   - UA unremarkable   - GC/C collected   - Vaginitis negative   - Nitrazine positive - contaminated with blood 2/2 friable cervix   - Ferning negative   - Low suspicion for rupture of membranes at this time as patient reports leakage of fluid has resolved and ferning and pooling absent. Itching   - Patient reports itching all over as well in palms of her hand and soles of her feet. - No rash noted on hands, feet, or body. - CMP showing elevated alk phos, 268. Remainder unremarkable. - Bile acids ordered, will follow results. - Possibility of ICP discussed with patient but advised necessity of results of bile acids. Patient advised to follow up with primary OBGYN as well. Patient advised to return to hospital with worsening symptoms. Patient voices understanding and is agreeable. Patient is aware that she should return to the hospital if she has worsening contractions, LOF, VB or decreased fetal movements. She voices understanding. Patient is aware that she should return to hospital if she experiences unrelenting headache, vision changes, RUQ pain, nausea, vomiting, and peripheral edema. She voices understanding. Chronic Hypertension (no meds)   - Blood pressure normotensive today   - Patient denies s/s preeclampsia    Anxiety   - Mood stable on Buspar    BMI 30        Patient Active Problem List    Diagnosis Date Noted    Sinus tachycardia 08/10/2020     Priority: High    Atypical chest pain 08/10/2020     Priority: High    34 weeks gestation of pregnancy 07/19/2021    Itching 07/19/2021     Bile acids ordered 7/19/21  Patient reports itching on whole body but also reports palms and soles.  Essential hypertension 02/10/2021    COVID-19 08/10/2020    Anxiety 08/10/2020    Dysmenorrhea 08/28/2012    Menorrhagia 02/20/2012       Plan discussed with Dr. Talita No, who is agreeable. Steroids given this admission: No    Risks, benefits, alternatives and possible complications have been discussed in detail with the patient. Admission, and post admission procedures and expectations were discussed in detail.  All questions were answered.     Attending's Name: Dr. Mauricio Ocampo DO  Ob/Gyn Resident  7/19/2021, 1:02 PM

## 2021-07-19 NOTE — LETTER
[facility] Labor [facility] Delivery  [address] [facility] [address]  Phone: [phone]          July 19, 2021      Please excuse Donya Sandoval from work today as she was seen at the hospital. Please contact us with any questions or concerns. Thank you.       Sincerely,      Peter Guadarrama, DO PGY2

## 2021-07-20 LAB — BILE ACIDS TOTAL: 13 UMOL/L (ref 0–10)

## 2021-07-21 ENCOUNTER — ROUTINE PRENATAL (OUTPATIENT)
Dept: PERINATAL CARE | Age: 26
End: 2021-07-21
Payer: COMMERCIAL

## 2021-07-21 VITALS
DIASTOLIC BLOOD PRESSURE: 70 MMHG | BODY MASS INDEX: 30.21 KG/M2 | TEMPERATURE: 97.1 F | HEART RATE: 108 BPM | SYSTOLIC BLOOD PRESSURE: 110 MMHG | RESPIRATION RATE: 16 BRPM | WEIGHT: 160 LBS | HEIGHT: 61 IN

## 2021-07-21 DIAGNOSIS — Z3A.34 34 WEEKS GESTATION OF PREGNANCY: ICD-10-CM

## 2021-07-21 DIAGNOSIS — O16.3 HYPERTENSION AFFECTING PREGNANCY IN THIRD TRIMESTER: Primary | ICD-10-CM

## 2021-07-21 DIAGNOSIS — O41.93X0 ABNORMAL AMNIOTIC FLUID IN THIRD TRIMESTER, SINGLE OR UNSPECIFIED FETUS: ICD-10-CM

## 2021-07-21 LAB
C TRACH DNA GENITAL QL NAA+PROBE: NEGATIVE
N. GONORRHOEAE DNA: NEGATIVE
SPECIMEN DESCRIPTION: NORMAL

## 2021-07-21 PROCEDURE — 76820 UMBILICAL ARTERY ECHO: CPT | Performed by: OBSTETRICS & GYNECOLOGY

## 2021-07-21 PROCEDURE — 76818 FETAL BIOPHYS PROFILE W/NST: CPT | Performed by: OBSTETRICS & GYNECOLOGY

## 2021-07-21 PROCEDURE — 76821 MIDDLE CEREBRAL ARTERY ECHO: CPT | Performed by: OBSTETRICS & GYNECOLOGY

## 2021-07-22 PROBLEM — O26.649 INTRAHEPATIC CHOLESTASIS OF PREGNANCY: Status: ACTIVE | Noted: 2021-07-22

## 2021-07-22 PROBLEM — O26.619 INTRAHEPATIC CHOLESTASIS OF PREGNANCY: Status: ACTIVE | Noted: 2021-07-22

## 2021-07-22 PROBLEM — K83.1 INTRAHEPATIC CHOLESTASIS OF PREGNANCY: Status: ACTIVE | Noted: 2021-07-22

## 2021-07-26 ENCOUNTER — HOSPITAL ENCOUNTER (OUTPATIENT)
Age: 26
Discharge: HOME | End: 2021-07-26
Payer: COMMERCIAL

## 2021-07-26 ENCOUNTER — TELEPHONE (OUTPATIENT)
Dept: OBGYN CLINIC | Age: 26
End: 2021-07-26

## 2021-07-26 DIAGNOSIS — I10 CHRONIC HYPERTENSION: ICD-10-CM

## 2021-07-26 LAB
ALBUMIN SERPL-MCNC: 3.4 G/DL (ref 3.5–5.2)
ALBUMIN/GLOBULIN RATIO: ABNORMAL (ref 1–2.5)
ALP BLD-CCNC: 329 U/L (ref 35–104)
ALT SERPL-CCNC: 31 U/L (ref 5–33)
ANION GAP SERPL CALCULATED.3IONS-SCNC: 13 MMOL/L (ref 9–17)
AST SERPL-CCNC: 23 U/L
BILIRUB SERPL-MCNC: 0.48 MG/DL (ref 0.3–1.2)
BUN BLDV-MCNC: 6 MG/DL (ref 6–20)
BUN/CREAT BLD: ABNORMAL (ref 9–20)
CALCIUM SERPL-MCNC: 8.9 MG/DL (ref 8.6–10.4)
CHLORIDE BLD-SCNC: 98 MMOL/L (ref 98–107)
CO2: 23 MMOL/L (ref 20–31)
CREAT SERPL-MCNC: 0.52 MG/DL (ref 0.5–0.9)
GFR AFRICAN AMERICAN: >60 ML/MIN
GFR NON-AFRICAN AMERICAN: >60 ML/MIN
GFR SERPL CREATININE-BSD FRML MDRD: ABNORMAL ML/MIN/{1.73_M2}
GFR SERPL CREATININE-BSD FRML MDRD: ABNORMAL ML/MIN/{1.73_M2}
GLUCOSE BLD-MCNC: 115 MG/DL (ref 70–99)
POTASSIUM SERPL-SCNC: 3.9 MMOL/L (ref 3.7–5.3)
SODIUM BLD-SCNC: 134 MMOL/L (ref 135–144)
TOTAL PROTEIN: 6.5 G/DL (ref 6.4–8.3)

## 2021-07-26 PROCEDURE — 80053 COMPREHEN METABOLIC PANEL: CPT

## 2021-07-26 PROCEDURE — 36415 COLL VENOUS BLD VENIPUNCTURE: CPT

## 2021-07-26 RX ORDER — URSODIOL 300 MG/1
300 CAPSULE ORAL
Qty: 60 CAPSULE | Refills: 1 | Status: ON HOLD
Start: 2021-07-26 | End: 2021-07-31 | Stop reason: HOSPADM

## 2021-07-26 NOTE — TELEPHONE ENCOUNTER
Itching is getting worse- can we send meds over    jose antonio gifford    She is trying benadryl but it is not helping    Itching is keeping her up at night- palms of hand and bottom of feet

## 2021-07-27 ENCOUNTER — HOSPITAL ENCOUNTER (OUTPATIENT)
Age: 26
Discharge: HOME | End: 2021-07-27
Attending: OBSTETRICS & GYNECOLOGY | Admitting: OBSTETRICS & GYNECOLOGY
Payer: COMMERCIAL

## 2021-07-27 VITALS
SYSTOLIC BLOOD PRESSURE: 126 MMHG | TEMPERATURE: 99 F | DIASTOLIC BLOOD PRESSURE: 88 MMHG | RESPIRATION RATE: 18 BRPM | HEART RATE: 100 BPM

## 2021-07-27 PROBLEM — Z3A.35 35 WEEKS GESTATION OF PREGNANCY: Status: ACTIVE | Noted: 2021-07-27

## 2021-07-27 LAB
-: NORMAL
AMORPHOUS: NORMAL
BACTERIA: NORMAL
BILIRUBIN URINE: NEGATIVE
CASTS UA: NORMAL /LPF (ref 0–8)
COLOR: YELLOW
COMMENT UA: ABNORMAL
CRYSTALS, UA: NORMAL /HPF
DIRECT EXAM: NORMAL
EPITHELIAL CELLS UA: NORMAL /HPF (ref 0–5)
GLUCOSE URINE: NEGATIVE
KETONES, URINE: NEGATIVE
LEUKOCYTE ESTERASE, URINE: ABNORMAL
Lab: NORMAL
MUCUS: NORMAL
NITRITE, URINE: NEGATIVE
OTHER OBSERVATIONS UA: NORMAL
PH UA: 7.5 (ref 5–8)
PROTEIN UA: NEGATIVE
RBC UA: NORMAL /HPF (ref 0–4)
RENAL EPITHELIAL, UA: NORMAL /HPF
SPECIFIC GRAVITY UA: 1.01 (ref 1–1.03)
SPECIMEN DESCRIPTION: NORMAL
TRICHOMONAS: NORMAL
TURBIDITY: CLEAR
URINE HGB: NEGATIVE
UROBILINOGEN, URINE: NORMAL
WBC UA: NORMAL /HPF (ref 0–5)
YEAST: NORMAL

## 2021-07-27 PROCEDURE — 76818 FETAL BIOPHYS PROFILE W/NST: CPT

## 2021-07-27 PROCEDURE — 99213 OFFICE O/P EST LOW 20 MIN: CPT

## 2021-07-27 PROCEDURE — 87510 GARDNER VAG DNA DIR PROBE: CPT

## 2021-07-27 PROCEDURE — 87081 CULTURE SCREEN ONLY: CPT

## 2021-07-27 PROCEDURE — 6370000000 HC RX 637 (ALT 250 FOR IP): Performed by: STUDENT IN AN ORGANIZED HEALTH CARE EDUCATION/TRAINING PROGRAM

## 2021-07-27 PROCEDURE — 87660 TRICHOMONAS VAGIN DIR PROBE: CPT

## 2021-07-27 PROCEDURE — 87480 CANDIDA DNA DIR PROBE: CPT

## 2021-07-27 PROCEDURE — 81001 URINALYSIS AUTO W/SCOPE: CPT

## 2021-07-27 RX ORDER — PROMETHAZINE HYDROCHLORIDE 12.5 MG/1
12.5 TABLET ORAL EVERY 6 HOURS PRN
Status: DISCONTINUED | OUTPATIENT
Start: 2021-07-27 | End: 2021-07-27 | Stop reason: HOSPADM

## 2021-07-27 RX ORDER — ACETAMINOPHEN 500 MG
1000 TABLET ORAL EVERY 6 HOURS PRN
Status: DISCONTINUED | OUTPATIENT
Start: 2021-07-27 | End: 2021-07-27 | Stop reason: HOSPADM

## 2021-07-27 RX ORDER — ONDANSETRON 2 MG/ML
4 INJECTION INTRAMUSCULAR; INTRAVENOUS EVERY 6 HOURS PRN
Status: DISCONTINUED | OUTPATIENT
Start: 2021-07-27 | End: 2021-07-27 | Stop reason: HOSPADM

## 2021-07-27 RX ADMIN — ACETAMINOPHEN 1000 MG: 500 TABLET ORAL at 12:04

## 2021-07-27 ASSESSMENT — PAIN SCALES - GENERAL: PAINLEVEL_OUTOF10: 7

## 2021-07-27 NOTE — PROGRESS NOTES
Patient seen and evaluated. Doing well with no complaints. BPP 8/8. Has appointment tomorrow w/ Dr. Adriane Schultz to discuss delivery planning. Return precautions given. All questions answered and concerns addressed. Patient vocalized understanding. Biophysical Profile:   Amniotic Fluid Index: 9.54 cm  Tone: Present  Movement: Present  Breathing: Present    Biophysical Score: 8 / 8    Fetal Position: Cephalic    Dr. Lorena Bridges updated and in agreement w/ plan.      Mohsen Arambula DO  OB/GYN Resident, PGY3  [facility]  7/27/2021 2:31 PM

## 2021-07-27 NOTE — H&P
OBSTETRICAL HISTORY [facility]    Date: 2021       Time: 11:44 AM   Patient Name: Tracee Tompkins     Patient : 1995  Room/Bed: 4198/3332-29    Admission Date/Time: 2021 10:41 AM      CC: Cramping    HPI: Tracee Tompkins is a 23 y.o. Cassie Rosa Isela at 35w4d who presents to L&D with the c/o cramping. She states it began around 0300 today and have increased in intensity and frequency. She reports that she didn't try anything at home to relieve the pain. She denies recent intercourse, vaginal discharge, loss of fluid, or vaginal bleeding. Patient denies any fever, chills, N/V, headaches, vision changes, chest pain, shortness of breath, RUQ pain. Patient denies any vaginal discharge and any urinary complaints. The patient reports fetal movement is present, complains of contractions, denies loss of fluid, denies vaginal bleeding. DATING:  LMP: Patient's last menstrual period was 2020 (exact date).   Estimated Date of Delivery: 21   Based on: LMP c/w early US, at 7 3/7 weeks GA    PREGNANCY RISK FACTORS:  Patient Active Problem List   Diagnosis    Menorrhagia    Dysmenorrhea    COVID-19    Anxiety    Sinus tachycardia    Atypical chest pain    Essential hypertension    34 weeks gestation of pregnancy    Itching    Intrahepatic cholestasis of pregnancy    35 weeks gestation of pregnancy        Steroids Given In This Pregnancy:  no     REVIEW OF SYSTEMS:   Constitutional: negative fever, negative chills  HEENT: negative visual disturbances, negative headaches  Respiratory: negative dyspnea, negative cough  Cardiovascular: negative chest pain,  negative palpitations  Gastrointestinal: positive abdominal cramping, negative RUQ pain, negative N/V, negative diarrhea, negative constipation  Genitourinary: negative dysuria, negative vaginal discharge, negative vaginal bleeding  Dermatological: negative rash  Hematologic: negative bruising  Immunologic/Lymphatic: negative recent illness, negative recent sick contact  Musculoskeletal: negative lower back pain, negative myalgias, negative arthralgias  Neurological:  negative dizziness, negative weakness  Behavior/Psych: negative depression, negative anxiety    OBSTETRICAL HISTORY:   OB History    Para Term  AB Living   1 0 0 0 0 0   SAB TAB Ectopic Molar Multiple Live Births   0 0 0 0 0 0      # Outcome Date GA Lbr Von/2nd Weight Sex Delivery Anes PTL Lv   1 Current                PAST MEDICAL HISTORY:   has a past medical history of Dysmenorrhea and Menorrhagia. PAST SURGICAL HISTORY:   has a past surgical history that includes intrauterine device insertion (16 ); Endometrial biopsy (16); and Intrauterine Device Removal (2017). ALLERGIES:  is allergic to amoxil [amoxicillin]. MEDICATIONS:  Prior to Admission medications    Medication Sig Start Date End Date Taking? Authorizing Provider   ursodiol (ACTIGALL) 300 MG capsule Take 1 capsule by mouth 3 times daily (before meals) 21 Yes Kaden Guzman DO   busPIRone (BUSPAR) 5 MG tablet Take 5 mg by mouth 2 times daily 18  Yes Historical Provider, MD   Prenatal Vit-Fe Fumarate-FA (PRENATAL VITAMIN PO) Take by mouth   Yes Historical Provider, MD   Blood Pressure Monitoring ( WRIST CUFF BP MONITOR) MISC Please take BP before taking medications or when feeling lightheaded/dizzy.   Patient not taking: Reported on 2021   Naresh Davila,    Fexofenadine HCl (ALLEGRA ALLERGY PO) Take by mouth  Patient not taking: Reported on 2021    Historical Provider, MD       FAMILY HISTORY:  family history includes Alcohol Abuse in her mother; Arthritis in her maternal grandmother; Cancer (age of onset: 61) in her paternal aunt; Diabetes in her father; Heart Disease in her paternal grandfather and paternal grandmother; High Blood Pressure in her paternal grandfather and paternal grandmother; Hypertension in her father; Osteoarthritis in her maternal grandmother; Osteoporosis in her maternal grandmother; Substance Abuse in her mother. SOCIAL HISTORY:   reports that she has never smoked. She has never used smokeless tobacco. She reports previous alcohol use. She reports that she does not use drugs.     VITALS:  Vitals:    07/27/21 1106   BP: 126/88   Pulse: 100   Resp: 18   Temp: 99 °F (37.2 °C)         PHYSICAL EXAM:  Fetal Heart Monitor:  Baseline Heart Rate 150, moderate variability, present accelerations, absent decelerations  [place]: no contractions    General appearance:  no apparent distress, alert, and cooperative  HEENT: head atraumatic, normocephalic, moist mucous membranes, trachea midline  Neurologic:  alert, oriented, normal speech, no focal findings or movement disorder noted  Lungs:  No increased work of breathing, good air exchange  Heart:  regular rate and rhythm   Abdomen:  soft, gravid, non-tender, no rebound, guarding, or rigidity, and no RUQ or epigastric tenderness  Extremities:  no calf tenderness, non edematous  Musculoskeletal: Gross strength equal and intact throughout, no gross abnormalities, range of motion normal in hips, knees, shoulders and spine  Psychiatric: Mood appropriate, normal affect   Rectal Exam: not indicated  Sterile Speculum Exam:   Urethral meatus: normal appearing   Vulva: Normal hair distribution, normal appearing vulva, no masses, tenderness or lesions, normal clitoris   Vagina: Normal appearing vaginal mucosa without lesions, scant vaginal discharge noted in the posterior vault, no lacerations, no blood noted in posterior vaginal vault   Cervix: Normal appearing cervix without lesions, external os visibly closed, no lacerations or abnormal lesions visualized, no blood noted from cervical os  Sterile Vaginal Exam:  Cervix: No cervical motion tenderness  Cervix: Fingertip/30/ -3 confirmed by senior resident      PRENATAL LAB RESULTS:   Blood Type/Rh: O pos  Antibody Screen: negative  Hemoglobin, Hematocrit, Platelets: 33.6 / 09.4 / 386  Rubella: immune  T. Pallidum, IgG: non-reactive   Hepatitis B Surface Antigen: non-reactive   HIV: non-reactive   Sickle Cell Screen: not available  Gonorrhea: negative  Chlamydia: negative  Urine culture: negative, date: , 21     1 hour Glucose Tolerance Test: 101        Group B Strep: collected today  Cystic Fibrosis Screen: negative  First Trimester Screen: not available  MSAFP/Multiple Markers: normal  Non-Invasive Prenatal Testing: no aneuploidy detected  Anatomy US: left lateral placenta, 3vc, normal cord insertion, normal anatomy    ASSESSMENT & PLAN:  Devendra Van is a 23 y.o. female  at 29w2d   - GBS pending / Rh positive / R immune   - Pen G for GBS prophylaxis if delivery imminent   - GBS collected today    Abdominal Cramping   - cEFM/TOCO: Cat 1 tracing, no contractions   - SSE: no lacerations, no lesions, cervical os visibly closed   - SVE:Fingertip//-3 confirmed by senior resident   - UA not suspicious for infection   - Vaginitis DNA probe negative   - Plan for BPP   - Low suspicion for  labor at this time given no contractions and no cervical dilation. Will continue to monitor    Suspected ICP   - Patent previously has reported itchiness of palms and soles. Denies any itching today.    - Bile acids 13, 21   - Patient compliant with Ursodiol   - CMP unremarkable 21 and 21    cHTN (no meds)              - Blood pressure normotensive today              - Patient denies s/s preeclampsia     Anxiety              - Mood stable on Buspar     BMI 30    Patient Active Problem List    Diagnosis Date Noted    Sinus tachycardia 08/10/2020     Priority: High    Atypical chest pain 08/10/2020     Priority: High    35 weeks gestation of pregnancy 2021    Intrahepatic cholestasis of pregnancy 2021    34 weeks gestation of pregnancy 2021    Itching 2021     Bile acids ordered 7/19/21  Patient reports itching on whole body but also reports palms and soles.  Essential hypertension 02/10/2021    COVID-19 08/10/2020    Anxiety 08/10/2020    Dysmenorrhea 08/28/2012    Menorrhagia 02/20/2012       Plan discussed with Dr. Gene Wisdom, who is agreeable. Steroids given this admission: No    Risks, benefits, alternatives and possible complications have been discussed in detail with the patient. Admission, and post admission procedures and expectations were discussed in detail. All questions were answered. Attending's Name: Dr. Melida Burden,   Ob/Gyn Resident  7/27/2021, 11:44 AM    Resident Physician Statement  I have discussed the case, including pertinent history and exam findings with the above resident. I have personally seen the patient. I agree with the assessment, plan and orders as documented. I have made changes to the above note as needed. I have discussed the case with above named attending.          Syed Soares DO  OB/GYN Resident PGY4  7/27/2021  7:22 PM

## 2021-07-28 ENCOUNTER — ROUTINE PRENATAL (OUTPATIENT)
Dept: OBGYN CLINIC | Age: 26
End: 2021-07-28
Payer: COMMERCIAL

## 2021-07-28 ENCOUNTER — ROUTINE PRENATAL (OUTPATIENT)
Dept: PERINATAL CARE | Age: 26
End: 2021-07-28
Payer: COMMERCIAL

## 2021-07-28 VITALS — DIASTOLIC BLOOD PRESSURE: 74 MMHG | WEIGHT: 159 LBS | BODY MASS INDEX: 30.04 KG/M2 | SYSTOLIC BLOOD PRESSURE: 126 MMHG

## 2021-07-28 VITALS
BODY MASS INDEX: 29.83 KG/M2 | SYSTOLIC BLOOD PRESSURE: 116 MMHG | WEIGHT: 158 LBS | DIASTOLIC BLOOD PRESSURE: 69 MMHG | HEIGHT: 61 IN | HEART RATE: 83 BPM | RESPIRATION RATE: 16 BRPM | TEMPERATURE: 97.1 F

## 2021-07-28 DIAGNOSIS — K83.1 INTRAHEPATIC CHOLESTASIS OF PREGNANCY: ICD-10-CM

## 2021-07-28 DIAGNOSIS — O99.213 MATERNAL OBESITY SYNDROME IN THIRD TRIMESTER: ICD-10-CM

## 2021-07-28 DIAGNOSIS — O26.619 INTRAHEPATIC CHOLESTASIS OF PREGNANCY: ICD-10-CM

## 2021-07-28 DIAGNOSIS — O26.613 INTRAHEPATIC CHOLESTASIS OF PREGNANCY IN THIRD TRIMESTER: ICD-10-CM

## 2021-07-28 DIAGNOSIS — O41.00X0 OLIGOHYDRAMNIOS, ANTEPARTUM, SINGLE OR UNSPECIFIED FETUS: ICD-10-CM

## 2021-07-28 DIAGNOSIS — Z3A.35 35 WEEKS GESTATION OF PREGNANCY: Primary | ICD-10-CM

## 2021-07-28 DIAGNOSIS — O10.919 CHRONIC HYPERTENSION AFFECTING PREGNANCY: Primary | ICD-10-CM

## 2021-07-28 DIAGNOSIS — K83.1 INTRAHEPATIC CHOLESTASIS OF PREGNANCY IN THIRD TRIMESTER: ICD-10-CM

## 2021-07-28 DIAGNOSIS — I10 CHRONIC HYPERTENSION: ICD-10-CM

## 2021-07-28 PROCEDURE — 0502F SUBSEQUENT PRENATAL CARE: CPT | Performed by: OBSTETRICS & GYNECOLOGY

## 2021-07-28 PROCEDURE — 76820 UMBILICAL ARTERY ECHO: CPT | Performed by: OBSTETRICS & GYNECOLOGY

## 2021-07-28 PROCEDURE — 96372 THER/PROPH/DIAG INJ SC/IM: CPT | Performed by: OBSTETRICS & GYNECOLOGY

## 2021-07-28 PROCEDURE — 76819 FETAL BIOPHYS PROFIL W/O NST: CPT | Performed by: OBSTETRICS & GYNECOLOGY

## 2021-07-28 PROCEDURE — 76816 OB US FOLLOW-UP PER FETUS: CPT | Performed by: OBSTETRICS & GYNECOLOGY

## 2021-07-28 RX ORDER — BETAMETHASONE SODIUM PHOSPHATE AND BETAMETHASONE ACETATE 3; 3 MG/ML; MG/ML
12 INJECTION, SUSPENSION INTRA-ARTICULAR; INTRALESIONAL; INTRAMUSCULAR; SOFT TISSUE ONCE
Status: COMPLETED | OUTPATIENT
Start: 2021-07-28 | End: 2021-07-28

## 2021-07-28 RX ADMIN — BETAMETHASONE SODIUM PHOSPHATE AND BETAMETHASONE ACETATE 12 MG: 3; 3 INJECTION, SUSPENSION INTRA-ARTICULAR; INTRALESIONAL; INTRAMUSCULAR; SOFT TISSUE at 12:10

## 2021-07-28 NOTE — LETTER
[facility] [address]  Phone: [phone]  Fax: [phone]    Jailyn Counter        July 28, 2021     Patient: Verónica Coffey   YOB: 1995   Date of Visit: 7/28/2021       To Whom It May Concern:    Kamila Cook is off work due to complications in late pregnancy for his girlfriend Diana Craft, and delivery planned earlier than scheduled. Appointment and induction is scheduled for 7/29/21. If you have any questions or concerns, please don't hesitate to call.     Sincerely,        Rojas Howe, DO

## 2021-07-28 NOTE — PROGRESS NOTES
insurance  BOY :) knows gender  Plans to deliver at [facility] if possible  TDAP given    ICP -  testing, determine delivery timing              T-Dap Vaccine (27-36 weeks) Completed: Yes    Allergies: Allergies as of 2021 - Fully Reviewed 2021   Allergen Reaction Noted    Amoxil [amoxicillin] Swelling 2020       Group Beta Strep collection was completed. Yes   GBS Results:   Admission on 2021, Discharged on 2021   Component Date Value Ref Range Status    Color, UA 2021 YELLOW  YELLOW Final    Turbidity UA 2021 CLEAR  CLEAR Final    Glucose, Ur 2021 NEGATIVE  NEGATIVE Final    Bilirubin Urine 2021 NEGATIVE  NEGATIVE Final    Ketones, Urine 2021 NEGATIVE  NEGATIVE Final    Specific Gravity, UA 2021 1.006  1.005 - 1.030 Final    Urine Hgb 2021 NEGATIVE  NEGATIVE Final    pH, UA 2021 7.5  5.0 - 8.0 Final    Protein, UA 2021 NEGATIVE  NEGATIVE Final    Urobilinogen, Urine 2021 Normal  Normal Final    Nitrite, Urine 2021 NEGATIVE  NEGATIVE Final    Leukocyte Esterase, Urine 2021 TRACE* NEGATIVE Final    Urinalysis Comments 2021 NOT REPORTED   Final    - 2021        Final    WBC, UA 2021 2 TO 5  0 - 5 /HPF Final    RBC, UA 2021 None  0 - 4 /HPF Final    Reference range defined for non-centrifuged specimen.  Casts UA 2021 0 TO 2 HYALINE Reference range defined for non-centrifuged specimen. 0 - 8 /LPF Final    Crystals, UA 2021 NOT REPORTED  None /HPF Final    Epithelial Cells UA 2021 0 TO 2  0 - 5 /HPF Final    Renal Epithelial, UA 2021 NOT REPORTED  0 /HPF Final    Bacteria, UA 2021 NOT REPORTED  None Final    Mucus, UA 2021 NOT REPORTED  None Final    Trichomonas, UA 2021 NOT REPORTED  None Final    Amorphous, UA 2021 NOT REPORTED  None Final    Other Observations UA 2021 NOT REPORTED  NOT REQ.  Final    Yeast, UA 07/27/2021 NOT REPORTED  None Final    Specimen Description 07/27/2021 . VAGINA   Final    Special Requests 07/27/2021 NOT REPORTED   Final    Direct Exam 07/27/2021 NEGATIVE for Gardnerella vaginalis   Final    Direct Exam 07/27/2021 NEGATIVE for Trichomonas vaginalis   Final    Direct Exam 07/27/2021 NEGATIVE for Candida sp. Final    Direct Exam 07/27/2021 Method of testing is a DNA probe intended for detection and identification of Candida species, Gardnerella vaginalis, and Trichomonas vaginalis nucleic acid in vaginal fluid specimens from patients with symptoms of vaginitis/vaginosis. Final   Hospital Outpatient Visit on 07/26/2021   Component Date Value Ref Range Status    Glucose 07/26/2021 115* 70 - 99 mg/dL Final    BUN 07/26/2021 6  6 - 20 mg/dL Final    CREATININE 07/26/2021 0.52  0.50 - 0.90 mg/dL Final    Bun/Cre Ratio 07/26/2021 NOT REPORTED  9 - 20 Final    Calcium 07/26/2021 8.9  8.6 - 10.4 mg/dL Final    Sodium 07/26/2021 134* 135 - 144 mmol/L Final    Potassium 07/26/2021 3.9  3.7 - 5.3 mmol/L Final    Chloride 07/26/2021 98  98 - 107 mmol/L Final    CO2 07/26/2021 23  20 - 31 mmol/L Final    Anion Gap 07/26/2021 13  9 - 17 mmol/L Final    Alkaline Phosphatase 07/26/2021 329* 35 - 104 U/L Final    ALT 07/26/2021 31  5 - 33 U/L Final    AST 07/26/2021 23  <32 U/L Final    Total Bilirubin 07/26/2021 0.48  0.3 - 1.2 mg/dL Final    Total Protein 07/26/2021 6.5  6.4 - 8.3 g/dL Final    Albumin 07/26/2021 3.4* 3.5 - 5.2 g/dL Final    Albumin/Globulin Ratio 07/26/2021 NOT REPORTED  1.0 - 2.5 Final    GFR Non- 07/26/2021 >60  >60 mL/min Final    GFR  07/26/2021 >60  >60 mL/min Final    GFR Comment 07/26/2021        Final    Comment: Average GFR for 20-28 years old:   80 mL/min/1.73sq m  Chronic Kidney Disease:   <60 mL/min/1.73sq m  Kidney failure:   <15 mL/min/1.73sq m              eGFR calculated using average adult body mass. Additional eGFR calculator available at:        [email or web address]            GFR Staging 2021 NOT REPORTED   Final   ]  Sensitivities for clindamycin and erythromycin were ordered. No      The patient was counseled on the mandatory call ahead policy. She has been instructed to call the office at anytime prior to going into the hospital so the on-call physician may direct her to the appropriate facility for care. Exceptions were reviewed including but not limited to: Decreased fetal movement, vaginal Bleeding or hemorrhage, trauma, readily expectant delivery, or any instance where she feels 911 should be utilized. The patient was counseled on Labor & Delivery. Route of delivery and counseling on vaginal, operative vaginal, and  sections were completed with the risks of each to both the patient as well as her baby. The possibility of a blood transfusion was discussed as well. The patient was counseled on types of analgesia during labor and is considering either Regional or IV medication the risks, benefits and alternatives were discussed.  Testing:  Yes, ICP      Assessment:  1. Sandeep Rosales is a 23 y.o. female  2.   3. 35w5d      Patient Active Problem List    Diagnosis Date Noted    Sinus tachycardia 08/10/2020     Priority: High    Atypical chest pain 08/10/2020     Priority: High    35 weeks gestation of pregnancy 2021    Intrahepatic cholestasis of pregnancy 2021    34 weeks gestation of pregnancy 2021    Itching 2021     Overview Note:     Bile acids ordered 21  Patient reports itching on whole body but also reports palms and soles.  Essential hypertension 02/10/2021    COVID-19 08/10/2020    Anxiety 08/10/2020    Dysmenorrhea 2012    Menorrhagia 2012        Diagnosis Orders   1. 35 weeks gestation of pregnancy     2. Chronic hypertension     3.  Intrahepatic cholestasis of pregnancy Plan:  The patient will return to the office for her next visit in 3 weeks. See antepartum flow sheet. Given unrelenting itching not responding to ursodiol and elevated bile acids. I recommend delivery 36w. Discussed risks of ICP and recommendations for delivery 36w-39w per ACOG. Counseled on risk of prolonged labor, prolonged hospital stay and  section. Discussed waiting until 37 weeks, but she states itching is so severe and she has concern about spontaneous stillbirth and agrees with delivery at 36w0d        Patient was seen with total face to face time of 20 minutes. More than 50% of this visit was on counseling and education regarding her    Diagnosis Orders   1. 35 weeks gestation of pregnancy     2. Chronic hypertension     3. Intrahepatic cholestasis of pregnancy      and her options. She was also counseled on her preventative health maintenance recommendations and follow-up.

## 2021-07-28 NOTE — PROGRESS NOTES
First steroid injection given; ICP- induction is set for 36 weeks due to ICP and symptoms. She will be back tomorrow for NST and 2nd injection. Tolerated well. DEAN. Documented in MAR tab.

## 2021-07-29 ENCOUNTER — ROUTINE PRENATAL (OUTPATIENT)
Dept: OBGYN CLINIC | Age: 26
End: 2021-07-29
Payer: COMMERCIAL

## 2021-07-29 VITALS — DIASTOLIC BLOOD PRESSURE: 70 MMHG | SYSTOLIC BLOOD PRESSURE: 120 MMHG | BODY MASS INDEX: 29.85 KG/M2 | WEIGHT: 158 LBS

## 2021-07-29 DIAGNOSIS — O26.619 INTRAHEPATIC CHOLESTASIS OF PREGNANCY: Primary | ICD-10-CM

## 2021-07-29 DIAGNOSIS — O09.93 HIGH-RISK PREGNANCY, THIRD TRIMESTER: ICD-10-CM

## 2021-07-29 DIAGNOSIS — Z3A.35 35 WEEKS GESTATION OF PREGNANCY: ICD-10-CM

## 2021-07-29 DIAGNOSIS — I10 CHRONIC HYPERTENSION: ICD-10-CM

## 2021-07-29 DIAGNOSIS — K83.1 INTRAHEPATIC CHOLESTASIS OF PREGNANCY: Primary | ICD-10-CM

## 2021-07-29 PROCEDURE — 96372 THER/PROPH/DIAG INJ SC/IM: CPT | Performed by: NURSE PRACTITIONER

## 2021-07-29 PROCEDURE — 0502F SUBSEQUENT PRENATAL CARE: CPT | Performed by: NURSE PRACTITIONER

## 2021-07-29 PROCEDURE — 59025 FETAL NON-STRESS TEST: CPT | Performed by: NURSE PRACTITIONER

## 2021-07-29 RX ORDER — BETAMETHASONE SODIUM PHOSPHATE AND BETAMETHASONE ACETATE 3; 3 MG/ML; MG/ML
12 INJECTION, SUSPENSION INTRA-ARTICULAR; INTRALESIONAL; INTRAMUSCULAR; SOFT TISSUE ONCE
Status: COMPLETED | OUTPATIENT
Start: 2021-07-29 | End: 2021-07-29

## 2021-07-29 RX ADMIN — BETAMETHASONE SODIUM PHOSPHATE AND BETAMETHASONE ACETATE 12 MG: 3; 3 INJECTION, SUSPENSION INTRA-ARTICULAR; INTRALESIONAL; INTRAMUSCULAR; SOFT TISSUE at 11:37

## 2021-07-29 NOTE — PROGRESS NOTES
Second injection given; RUOQ. Steroid injection, being induced tomorrow- tolerated well. Documented in her chart and updated care coordination. She will be seen back for 2 and 6 week follow up postpartum visits.     Dx- ICP

## 2021-07-29 NOTE — PATIENT INSTRUCTIONS
After Hours Number: You can call the office [phone]  or [phone]  Call if you have:  1. Bleeding like a period  2. Cramps or contractions greater than 2 hours  3. If you are leaking fluid  4. If you've a fever greater than 100°  5. If you feel as if baby is not moving  6. If you have continuous vomiting over 3-4 hours   Counting Your Baby's Kicks: Care Instructions  Your Care Instructions    Counting your baby's kicks is one way your doctor can tell that your baby is healthy. Most women--especially in a first pregnancy--feel their baby move for the first time between 16 and 22 weeks. The movement may feel like flutters rather than kicks. Your baby may move more at certain times of the day. When you are active, you may notice less kicking than when you are resting. At your prenatal visits, your doctor will ask whether the baby is active. In your last trimester, your doctor may ask you to count the number of times you feel your baby move. Follow-up care is a key part of your treatment and safety. Be sure to make and go to all appointments, and call your doctor if you are having problems. It's also a good idea to know your test results and keep a list of the medicines you take. How do you count fetal kicks? · A common method of checking your baby's movement is to count the number of kicks or moves you feel in 1 hour. Ten movements (such as kicks, flutters, or rolls) in 1 hour are normal. Some doctors suggest that you count in the morning until you get to 10 movements. Then you can quit for that day and start again the next day. · Pick your baby's most active time of day to count. This may be any time from morning to evening. · If you do not feel 10 movements in an hour, your baby may be sleeping. Wait for the next hour and count again. When should you call for help?   Call your doctor now or seek immediate medical care if:    · You noticed that your baby has stopped moving or is moving much less make and go to all appointments, and call your doctor if you are having problems. It's also a good idea to know your test results and keep a list of the medicines you take. How can you care for yourself at home? · Take and record your blood pressure at home if your doctor tells you to. ? Learn the importance of the two measures of blood pressure (such as 120 over 80, or 120/80). The first number is the systolic pressure, which is the force of blood on the artery walls as the heart pumps. The second number is the diastolic pressure, which is the force of blood on the artery walls between heartbeats, when the heart is at rest. You have a choice of monitors to use. ? Manual monitor: You pump up the cuff and use a stethoscope to listen for your pulse. ? Electronic monitor: The cuff inflates, and a gauge shows your pulse rate. ? To take your blood pressure:  ? Ask your doctor to check your blood pressure monitor to be sure that it is accurate and that the cuff fits you. Also ask your doctor to watch you to make sure that you are using it right. ? You should not eat, use tobacco products, or use medicine known to raise blood pressure (such as some nasal decongestant sprays) before you take your blood pressure. ? Avoid taking your blood pressure if you have just exercised. Also avoid taking it if you are nervous or upset. Rest at least 15 minutes before you take your blood pressure. · If your doctor advises, check the protein levels in your urine. Your doctor or nurse will show you how to do this. · Take your medicines exactly as prescribed. Call your doctor if you think you are having a problem with your medicine. · Do not smoke. Quitting smoking will help improve your baby's growth and health. If you need help quitting, talk to your doctor about stop-smoking programs and medicines. These can increase your chances of quitting for good.   · Eat a balanced and healthy diet that has lots of fruits and you have questions about a medical condition or this instruction, always ask your healthcare professional. [address] any warranty or liability for your use of this information.  Labor: Care Instructions  Your Care Instructions     labor is the start of labor between 21 and 36 weeks of pregnancy. A full-term pregnancy lasts 37 to 42 weeks. In labor, the uterus contracts to open the cervix. This is the first stage of childbirth.  labor can be caused by a problem with the baby, the mother, or both. Often the cause is not known. In some cases, doctors use medicines to try to delay labor until 29 or more weeks of pregnancy. By this time, a baby has grown enough so that problems are not likely. In some cases--such as with a serious infection--it is healthier for the baby to be born early. Your treatment will depend on how far along you are in your pregnancy and on your health and your baby's health. Follow-up care is a key part of your treatment and safety. Be sure to make and go to all appointments, and call your doctor if you are having problems. It's also a good idea to know your test results and keep a list of the medicines you take. How can you care for yourself at home? · If your doctor prescribed medicines, take them exactly as directed. Call your doctor if you think you are having a problem with your medicine. · Rest until your doctor advises you about activity. He or she will tell you if you should stay in bed most of the time. You may need to arrange for  if you have young children. · Do not have sexual intercourse unless your doctor says it is safe. · Use pads, not tampons, if you have vaginal bleeding. · Make sure to drink plenty of fluids. Dehydration can lead to contractions. If you have kidney, heart, or liver disease and have to limit fluids, talk with your doctor before you increase the amount of fluids you drink.   · Do not smoke or instructions adapted under license by [facility] ([facility]). If you have questions about a medical condition or this instruction, always ask your healthcare professional. [address] any warranty or liability for your use of this information. Patient Education        Labor Induction: Care Instructions  Overview  If you pass your due date and your labor does not start on its own, your doctor may want to try to start (induce) labor. Your doctor may suggest doing this for other reasons. It may be a good idea to induce labor if you have another problem. For example, it may be done if you have high blood pressure. Or it may be a good idea if the placenta can no longer give enough support to the baby. There are several ways to induce labor, such as using medicine or breaking the amniotic sac. After you have your baby, you should not have any side effects from the medicine used to start labor. Follow-up care is a key part of your treatment and safety. Be sure to make and go to all appointments, and call your doctor if you are having problems. It's also a good idea to know your test results and keep a list of the medicines you take. When should your labor be induced? Labor induction may be done if labor doesn't start on its own. Labor may be induced when:  · Your pregnancy has gone 1 to 2 weeks past your expected due date. · You have a problem that may harm your health or the health of your baby if you continue to be pregnant. This includes high blood pressure, preeclampsia, and diabetes. · Your water breaks, but labor does not start. When should you call for help? Watch closely for changes in your health, and be sure to contact your doctor if you have questions about inducing labor. Where can you learn more? Go to https://marquis.[employer]. org and sign in to your [employer] account. Enter P594 in the [employer] box to learn more about \"Labor Induction: Care Instructions. \" If you do not have an account, please click on the \"Sign Up Now\" link. Current as of: October 8, 2020               Content Version: 12.9  © 2006-2021 [employer]. Care instructions adapted under license by [facility] ([facility]). If you have questions about a medical condition or this instruction, always ask your healthcare professional. [address] any warranty or liability for your use of this information.

## 2021-07-29 NOTE — PROGRESS NOTES
S:  Here for NST for fetal surveillance secondary to ICP, cHTN,   She  has no complaints today  Reports fetal movement    First trimester screen placed- declined, did not have enough PTO to make appt  G1  Needs FMLA for intermittent leave for appts   Chronic Hypertension - restarted labetalol 2/10/21 (NOT taking, BP dropped too low, she is just monitoring BP as of now and follows up with cardiologist 21)  ASA 81mg   being done at [place] [id number] (dopplers)- being seen here just for prenatal visits  Passed 1 hr GTT  Offer Tdap again next OV wanted to check with insurance  BOY :) knows gender  [facility] induction 10am on - 36 weeks  Steroid injection given  and  (betamethasone)  TDAP given    ICP -  testing               O:  Vitals:    21 1111   BP: 120/70   Weight: 158 lb (71.7 kg)        Baseline:  120  Variability:  Moderate  Accelerations:  Present  Decelerations:  Absent- one possible variable vs artifact- monitor fetal movement closely, if decreased fetal movement go to [facility]. Sh eis being induced tomorrow for ICP  Fetal Movement:  Perceived by patient during NST      A/P:    1. Intrahepatic cholestasis of pregnancy    - MN FETAL NON-STRESS TEST  - betamethasone acetate-betamethasone sodium phosphate (CELESTONE) injection 12 mg  - MN INJECTION,THERAP/PROPH/DIAGNOST, IM OR SUBCUT    2. Chronic hypertension    - MN FETAL NON-STRESS TEST  - betamethasone acetate-betamethasone sodium phosphate (CELESTONE) injection 12 mg  - MN INJECTION,THERAP/PROPH/DIAGNOST, IM OR SUBCUT    3. 35 weeks gestation of pregnancy    - MN FETAL NON-STRESS TEST  - betamethasone acetate-betamethasone sodium phosphate (CELESTONE) injection 12 mg  - MN INJECTION,THERAP/PROPH/DIAGNOST, IM OR SUBCUT    4.  High-risk pregnancy, third trimester    - MN FETAL NON-STRESS TEST  - betamethasone acetate-betamethasone sodium phosphate (CELESTONE) injection 12 mg  - MN INJECTION,THERAP/PROPH/DIAGNOST, IM OR SUBCUT      Reactive NST  Induction tomorrow    Keep scheduled fetal surveillance appointment  Continue Fetal Kick Counts

## 2021-07-30 ENCOUNTER — HOSPITAL ENCOUNTER (INPATIENT)
Age: 26
LOS: 3 days | Discharge: HOME | End: 2021-08-02
Attending: OBSTETRICS & GYNECOLOGY | Admitting: OBSTETRICS & GYNECOLOGY
Payer: COMMERCIAL

## 2021-07-30 PROBLEM — Z3A.36 36 WEEKS GESTATION OF PREGNANCY: Status: ACTIVE | Noted: 2021-07-30

## 2021-07-30 LAB
ABO/RH: NORMAL
ABSOLUTE EOS #: 0 K/UL (ref 0–0.4)
ABSOLUTE IMMATURE GRANULOCYTE: 0.17 K/UL (ref 0–0.3)
ABSOLUTE LYMPH #: 2.42 K/UL (ref 1–4.8)
ABSOLUTE MONO #: 0.52 K/UL (ref 0.1–0.8)
AMPHETAMINE SCREEN URINE: NEGATIVE
ANTIBODY SCREEN: NEGATIVE
ARM BAND NUMBER: NORMAL
BARBITURATE SCREEN URINE: NEGATIVE
BASOPHILS # BLD: 0 % (ref 0–2)
BASOPHILS ABSOLUTE: 0 K/UL (ref 0–0.2)
BENZODIAZEPINE SCREEN, URINE: NEGATIVE
BUPRENORPHINE URINE: NORMAL
CANNABINOID SCREEN URINE: NEGATIVE
COCAINE METABOLITE, URINE: NEGATIVE
CULTURE: NORMAL
DIFFERENTIAL TYPE: ABNORMAL
EOSINOPHILS RELATIVE PERCENT: 0 % (ref 1–4)
EXPIRATION DATE: NORMAL
HCT VFR BLD CALC: 35.4 % (ref 36.3–47.1)
HEMOGLOBIN: 11.2 G/DL (ref 11.9–15.1)
IMMATURE GRANULOCYTES: 1 %
LYMPHOCYTES # BLD: 14 % (ref 24–44)
Lab: NORMAL
MCH RBC QN AUTO: 24.4 PG (ref 25.2–33.5)
MCHC RBC AUTO-ENTMCNC: 31.6 G/DL (ref 28.4–34.8)
MCV RBC AUTO: 77.1 FL (ref 82.6–102.9)
MDMA URINE: NORMAL
METHADONE SCREEN, URINE: NEGATIVE
METHAMPHETAMINE, URINE: NORMAL
MONOCYTES # BLD: 3 % (ref 1–7)
MORPHOLOGY: ABNORMAL
MORPHOLOGY: ABNORMAL
NRBC AUTOMATED: 0.1 PER 100 WBC
OPIATES, URINE: NEGATIVE
OXYCODONE SCREEN URINE: NEGATIVE
PDW BLD-RTO: 15.6 % (ref 11.8–14.4)
PHENCYCLIDINE, URINE: NEGATIVE
PLATELET # BLD: 393 K/UL (ref 138–453)
PLATELET ESTIMATE: ABNORMAL
PMV BLD AUTO: 9.9 FL (ref 8.1–13.5)
PROPOXYPHENE, URINE: NORMAL
RBC # BLD: 4.59 M/UL (ref 3.95–5.11)
RBC # BLD: ABNORMAL 10*6/UL
SARS-COV-2, RAPID: NOT DETECTED
SEG NEUTROPHILS: 82 % (ref 36–66)
SEGMENTED NEUTROPHILS ABSOLUTE COUNT: 14.19 K/UL (ref 1.8–7.7)
SPECIMEN DESCRIPTION: NORMAL
SPECIMEN DESCRIPTION: NORMAL
T. PALLIDUM, IGG: NONREACTIVE
TEST INFORMATION: NORMAL
TRICYCLIC ANTIDEPRESSANTS, UR: NORMAL
WBC # BLD: 17.3 K/UL (ref 3.5–11.3)
WBC # BLD: ABNORMAL 10*3/UL

## 2021-07-30 PROCEDURE — 86900 BLOOD TYPING SEROLOGIC ABO: CPT

## 2021-07-30 PROCEDURE — 1220000000 HC SEMI PRIVATE OB R&B

## 2021-07-30 PROCEDURE — 6370000000 HC RX 637 (ALT 250 FOR IP): Performed by: STUDENT IN AN ORGANIZED HEALTH CARE EDUCATION/TRAINING PROGRAM

## 2021-07-30 PROCEDURE — 86780 TREPONEMA PALLIDUM: CPT

## 2021-07-30 PROCEDURE — 87635 SARS-COV-2 COVID-19 AMP PRB: CPT

## 2021-07-30 PROCEDURE — 59200 INSERT CERVICAL DILATOR: CPT

## 2021-07-30 PROCEDURE — 84156 ASSAY OF PROTEIN URINE: CPT

## 2021-07-30 PROCEDURE — 80307 DRUG TEST PRSMV CHEM ANLYZR: CPT

## 2021-07-30 PROCEDURE — 3E0P7VZ INTRODUCTION OF HORMONE INTO FEMALE REPRODUCTIVE, VIA NATURAL OR ARTIFICIAL OPENING: ICD-10-PCS | Performed by: OBSTETRICS & GYNECOLOGY

## 2021-07-30 PROCEDURE — 6360000002 HC RX W HCPCS: Performed by: STUDENT IN AN ORGANIZED HEALTH CARE EDUCATION/TRAINING PROGRAM

## 2021-07-30 PROCEDURE — 82570 ASSAY OF URINE CREATININE: CPT

## 2021-07-30 PROCEDURE — 86850 RBC ANTIBODY SCREEN: CPT

## 2021-07-30 PROCEDURE — 2580000003 HC RX 258: Performed by: STUDENT IN AN ORGANIZED HEALTH CARE EDUCATION/TRAINING PROGRAM

## 2021-07-30 PROCEDURE — 85025 COMPLETE CBC W/AUTO DIFF WBC: CPT

## 2021-07-30 PROCEDURE — 86901 BLOOD TYPING SEROLOGIC RH(D): CPT

## 2021-07-30 RX ORDER — DIPHENHYDRAMINE HCL 25 MG
25 TABLET ORAL EVERY 4 HOURS PRN
Status: DISCONTINUED | OUTPATIENT
Start: 2021-07-30 | End: 2021-07-31

## 2021-07-30 RX ORDER — SODIUM CHLORIDE 0.9 % (FLUSH) 0.9 %
5-40 SYRINGE (ML) INJECTION PRN
Status: DISCONTINUED | OUTPATIENT
Start: 2021-07-30 | End: 2021-07-31

## 2021-07-30 RX ORDER — SODIUM CHLORIDE 0.9 % (FLUSH) 0.9 %
5-40 SYRINGE (ML) INJECTION EVERY 12 HOURS SCHEDULED
Status: DISCONTINUED | OUTPATIENT
Start: 2021-07-30 | End: 2021-07-31

## 2021-07-30 RX ORDER — LIDOCAINE HYDROCHLORIDE 10 MG/ML
30 INJECTION, SOLUTION EPIDURAL; INFILTRATION; INTRACAUDAL; PERINEURAL PRN
Status: DISCONTINUED | OUTPATIENT
Start: 2021-07-30 | End: 2021-07-31

## 2021-07-30 RX ORDER — NALBUPHINE HCL 10 MG/ML
10 AMPUL (ML) INJECTION ONCE
Status: COMPLETED | OUTPATIENT
Start: 2021-07-31 | End: 2021-07-31

## 2021-07-30 RX ORDER — ONDANSETRON 2 MG/ML
4 INJECTION INTRAMUSCULAR; INTRAVENOUS EVERY 6 HOURS PRN
Status: DISCONTINUED | OUTPATIENT
Start: 2021-07-30 | End: 2021-07-31

## 2021-07-30 RX ORDER — SODIUM CHLORIDE, SODIUM LACTATE, POTASSIUM CHLORIDE, CALCIUM CHLORIDE 600; 310; 30; 20 MG/100ML; MG/100ML; MG/100ML; MG/100ML
INJECTION, SOLUTION INTRAVENOUS CONTINUOUS
Status: DISCONTINUED | OUTPATIENT
Start: 2021-07-30 | End: 2021-07-31

## 2021-07-30 RX ORDER — CALCIUM CARBONATE 200(500)MG
500 TABLET,CHEWABLE ORAL 3 TIMES DAILY PRN
Status: DISCONTINUED | OUTPATIENT
Start: 2021-07-30 | End: 2021-07-31

## 2021-07-30 RX ORDER — SODIUM CHLORIDE 9 MG/ML
25 INJECTION, SOLUTION INTRAVENOUS PRN
Status: DISCONTINUED | OUTPATIENT
Start: 2021-07-30 | End: 2021-07-31

## 2021-07-30 RX ORDER — SODIUM CHLORIDE, SODIUM LACTATE, POTASSIUM CHLORIDE, AND CALCIUM CHLORIDE .6; .31; .03; .02 G/100ML; G/100ML; G/100ML; G/100ML
500 INJECTION, SOLUTION INTRAVENOUS PRN
Status: DISCONTINUED | OUTPATIENT
Start: 2021-07-30 | End: 2021-07-31

## 2021-07-30 RX ORDER — SODIUM CHLORIDE, SODIUM LACTATE, POTASSIUM CHLORIDE, AND CALCIUM CHLORIDE .6; .31; .03; .02 G/100ML; G/100ML; G/100ML; G/100ML
1000 INJECTION, SOLUTION INTRAVENOUS PRN
Status: DISCONTINUED | OUTPATIENT
Start: 2021-07-30 | End: 2021-07-31

## 2021-07-30 RX ORDER — BUSPIRONE HYDROCHLORIDE 5 MG/1
5 TABLET ORAL 2 TIMES DAILY
Status: DISCONTINUED | OUTPATIENT
Start: 2021-07-30 | End: 2021-08-02 | Stop reason: HOSPADM

## 2021-07-30 RX ORDER — URSODIOL 250 MG/1
250 TABLET, FILM COATED ORAL 3 TIMES DAILY
Status: DISCONTINUED | OUTPATIENT
Start: 2021-07-30 | End: 2021-07-31

## 2021-07-30 RX ORDER — ACETAMINOPHEN 500 MG
1000 TABLET ORAL EVERY 6 HOURS PRN
Status: DISCONTINUED | OUTPATIENT
Start: 2021-07-30 | End: 2021-07-31

## 2021-07-30 RX ADMIN — ANTACID TABLETS 500 MG: 500 TABLET, CHEWABLE ORAL at 21:59

## 2021-07-30 RX ADMIN — Medication 1 MILLI-UNITS/MIN: at 23:00

## 2021-07-30 RX ADMIN — ANTACID TABLETS 500 MG: 500 TABLET, CHEWABLE ORAL at 17:42

## 2021-07-30 RX ADMIN — Medication 25 MCG: at 11:49

## 2021-07-30 RX ADMIN — SODIUM CHLORIDE, POTASSIUM CHLORIDE, SODIUM LACTATE AND CALCIUM CHLORIDE: 600; 310; 30; 20 INJECTION, SOLUTION INTRAVENOUS at 11:57

## 2021-07-30 RX ADMIN — Medication 50 MCG: at 16:06

## 2021-07-30 RX ADMIN — SODIUM CHLORIDE, POTASSIUM CHLORIDE, SODIUM LACTATE AND CALCIUM CHLORIDE: 600; 310; 30; 20 INJECTION, SOLUTION INTRAVENOUS at 19:07

## 2021-07-30 ASSESSMENT — PAIN SCALES - GENERAL: PAINLEVEL_OUTOF10: 0

## 2021-07-30 NOTE — DISCHARGE SUMMARY
Obstetric Discharge Summary  [facility]    Patient Name: Quita Cabrera  Patient : 1995  Primary Care Physician: Cherie Enriquez PA-C  Admit Date: 2021    Principal Diagnosis: IUP at 36w0d, admitted for IOL 2/2 ICP     Her pregnancy has been complicated by:   Patient Active Problem List   Diagnosis    COVID-19 (2020)    Anxiety    Sinus tachycardia    Essential hypertension    Intrahepatic cholestasis of pregnancy (G1)     21 M Apg 8/9 Wt 4#13       Infection Present?: No  Hospital Acquired: No    Surgical Operations & Procedures:  Analgesia: epidural  Delivery Type: Spontaneous Vaginal Delivery: See Labor and Delivery Summary   Laceration(s): Absent    Consultations: Anesthesia    Pertinent Findings & Procedures:   Quita Cabrera is a 23 y.o. female  at 36w0d admitted for IOL 2/2 ICP; received cytotec 25 mcg PV x1, cytotec 50 mcg BU x1, vazquez balloon, pitocin, Nubain x1, AROM, epidural, IUPC, FSE. She has a history of cHTN and preE labs were abnl with ALT/AST 38/22, P/C 0.09. She delivered by spontaneous vaginal a Live Born infant on 21. Information for the patient's :  Kevin Starkey [[id number]]   male   Birth Weight: 4 lb 13.3 oz (2.19 kg)     Apgars: 8 at 1 minute and 9 at 5 minutes.      Postpartum course: normal.    PPD#1: Hgb 10.7    Course of patient: uncomplicated    Discharge to: Home    Readmission planned: no     Recommendations on Discharge:     Medications:      Medication List      START taking these medications    docusate sodium 100 MG capsule  Commonly known as: COLACE  Take 1 capsule by mouth 2 times daily     ibuprofen 600 MG tablet  Commonly known as: ADVIL;MOTRIN  Take 1 tablet by mouth every 6 hours as needed for Pain        CONTINUE taking these medications    ALLEGRA ALLERGY PO     busPIRone 5 MG tablet  Commonly known as: BUSPAR     PRENATAL VITAMIN PO     SM Wrist Cuff BP Monitor Misc  Please take BP before taking medications or when feeling lightheaded/dizzy. STOP taking these medications    ursodiol 300 MG capsule  Commonly known as: Actigall           Where to Get Your Medications      You can get these medications from any pharmacy    Bring a paper prescription for each of these medications  · docusate sodium 100 MG capsule  · ibuprofen 600 MG tablet       Activity: pelvic rest x 6 weeks, no lifting greater than 15 lbs  Diet: regular diet  Follow up: 1 week    Condition on discharge: stable    Discharge date: 8/2/21    Hay Olivier MD  Ob/Gyn Resident    Comments:  Home care and follow-up care were reviewed. Pelvic rest, and birth control were reviewed. Signs and symptoms of mastitis and post partum depression were reviewed. The patient is to notify her physician if any of these occur. The patient was counseled on secondary smoke risks and the increased risk of sudden infant death syndrome and respiratory problems to her baby with exposure. She was counseled on various alternate recommendations to decrease the exposure to secondary smoke to her children.     Senior Residents Attestation Statement   I discussed the findings and plans with the resident physician and agree as documented in her note     Malick Ha DO   OB/GYN Resident  Pager [phone]  Rg Weston  8/2/2021, 2:06 PM

## 2021-07-30 NOTE — H&P
OBSTETRICAL HISTORY [facility]    Date: 2021       Time: 10:56 AM   Patient Name: Cayla Escalante     Patient : 1995  Room/Bed: 0706/0706-01    Admission Date/Time: 2021  9:55 AM      CC: IOL 2/2 ICP     HPI: Cayla Escalante is a 23 y.o. Buelah Blase at 36w0d who presents for IOL 2/2 ICP. Patient denies any fever, chills, N/V, headaches, vision changes, chest pain, shortness of breath, RUQ pain, abdominal pain. Patient denies any vaginal discharge and any urinary complaints. The patient reports fetal movement is present, denies contractions, denies loss of fluid, denies vaginal bleeding. DATING:  LMP: Patient's last menstrual period was 2020 (exact date).   Estimated Date of Delivery: 21   Based on: LMP c/w early US, at 7 3/7 weeks GA    PREGNANCY RISK FACTORS:  Patient Active Problem List   Diagnosis    Menorrhagia    Dysmenorrhea    COVID-19    Anxiety    Sinus tachycardia    Atypical chest pain    Essential hypertension    34 weeks gestation of pregnancy    Itching    Intrahepatic cholestasis of pregnancy    35 weeks gestation of pregnancy    36 weeks gestation of pregnancy        Steroids Given In This Pregnancy:  yes, date: 21, 21     REVIEW OF SYSTEMS:  Constitutional: negative fever, negative chills  HEENT: negative visual disturbances, negative headaches  Respiratory: negative dyspnea, negative cough  Cardiovascular: negative chest pain,  negative palpitations  Gastrointestinal: negative abdominal pain, negative RUQ pain, negative N/V, negative diarrhea, negative constipation  Genitourinary: negative dysuria, negative vaginal discharge, negative vaginal bleeding  Dermatological: negative rash  Hematologic: negative bruising  Immunologic/Lymphatic: negative recent illness, negative recent sick contact  Musculoskeletal: negative back pain, negative myalgias, negative arthralgias  Neurological:  negative dizziness, negative weakness  Behavior/Psych: negative depression, negative anxiety    OBSTETRICAL HISTORY:   OB History    Para Term  AB Living   1 0 0 0 0 0   SAB TAB Ectopic Molar Multiple Live Births   0 0 0 0 0 0      # Outcome Date GA Lbr Von/2nd Weight Sex Delivery Anes PTL Lv   1 Current                PAST MEDICAL HISTORY:   has a past medical history of Dysmenorrhea and Menorrhagia. PAST SURGICAL HISTORY:   has a past surgical history that includes intrauterine device insertion (16 ); Endometrial biopsy (16); and Intrauterine Device Removal (2017). ALLERGIES:  is allergic to amoxil [amoxicillin]. MEDICATIONS:  Prior to Admission medications    Medication Sig Start Date End Date Taking? Authorizing Provider   ursodiol (ACTIGALL) 300 MG capsule Take 1 capsule by mouth 3 times daily (before meals) 21 Yes Kaden Guzman DO   busPIRone (BUSPAR) 5 MG tablet Take 5 mg by mouth 2 times daily 18  Yes Historical Provider, MD   Prenatal Vit-Fe Fumarate-FA (PRENATAL VITAMIN PO) Take by mouth   Yes Historical Provider, MD   Fexofenadine HCl (ALLEGRA ALLERGY PO) Take by mouth    Yes Historical Provider, MD   Blood Pressure Monitoring (SM WRIST CUFF BP MONITOR) MISC Please take BP before taking medications or when feeling lightheaded/dizzy. 21   Christine Irvin DO       FAMILY HISTORY:  family history includes Alcohol Abuse in her mother; Arthritis in her maternal grandmother; Cancer (age of onset: 61) in her paternal aunt; Diabetes in her father; Heart Disease in her paternal grandfather and paternal grandmother; High Blood Pressure in her paternal grandfather and paternal grandmother; Hypertension in her father; Osteoarthritis in her maternal grandmother; Osteoporosis in her maternal grandmother; Substance Abuse in her mother. SOCIAL HISTORY:   reports that she has never smoked. She has never used smokeless tobacco. She reports previous alcohol use.  She reports that she does not use drugs. VITALS:  Vitals:    07/30/21 1044   BP: 128/79   Pulse: 104   Resp: 18   Temp: 98.6 °F (37 °C)   TempSrc: Oral   SpO2: 100%   Weight: 158 lb (71.7 kg)   Height: 5' 1\" (1.549 m)         PHYSICAL EXAM:  Fetal Heart Monitor:  Baseline Heart Rate 140 bpm, moderate variability, present accelerations, absent decelerations  Cornish: no contractions    General appearance:  no apparent distress, alert, and cooperative  HEENT: head atraumatic, normocephalic, moist mucous membranes, trachea midline  Neurologic:  alert, oriented, normal speech, no focal findings or movement disorder noted  Lungs:  No increased work of breathing, good air exchange  Heart:  regular rate and rhythm  Abdomen:  soft, gravid, non-tender, no rebound, guarding, or rigidity, and no RUQ or epigastric tenderness  Extremities:  no calf tenderness, non edematous  Musculoskeletal: Gross strength equal and intact throughout, no gross abnormalities, range of motion normal in hips, knees, shoulders and spine  Psychiatric: Mood appropriate, normal affect   Rectal Exam: not indicated  Pelvic exam: Chaperone was present for entire exam  Chaperone name: Roya JARVIS  Sterile Vaginal Exam:  Cervix: No cervical motion tenderness  Cervix: 1/50/-2 confirmed by senior resident   Bishops Score: 4     0 1 2 3   Position Posterior Intermediate Anterior -   Consistency Firm Intermediate Soft -   Effacement 0-30% 31-50% 51-80% >80%   Dilation 0cm 1-2cm 3-4cm >5cm   Fetal Station -3 -2 -1, 0 +1, +2       LIMITED BEDSIDE US:   Position: Cephalic  Placental Location: left lateral  Fetal Heart Tones: Present  Fetal Movement: Present  Amniotic Fluid Index/Volume: >2x2 cm MVP  Estimated Fetal Weight:  5 lbs 5oz    PRENATAL LAB RESULTS:   Blood Type/Rh: O pos  Antibody Screen: negative  Hemoglobin, Hematocrit, Platelets: 14.4 / 63.8 / 386  Rubella: immune  T.  Pallidum, IgG: non-reactive   Hepatitis B Surface Antigen: non-reactive   HIV: non-reactive   Sickle Cell Screen: not available  Gonorrhea: negative  Chlamydia: negative  Urine culture: negative, date: , 21     1 hour Glucose Tolerance Test: 101        Group B Strep: negative  Cystic Fibrosis Screen: negative  First Trimester Screen: not available  MSAFP/Multiple Markers: normal  Non-Invasive Prenatal Testing: no aneuploidy detected  Anatomy US: left lateral placenta, 3vc, normal cord insertion, normal anatomy    ASSESSMENT & PLAN:  Sandeep Rosales is a 23 y.o. female  at 36w0d IUP   - GBS negative / Rh positive / R immune   - No indication for GBS prophylaxis      IOL 2/2 ICP            - Admit to labor and delivery under the service of Dr. Maurilio Lynch   - VSS, afebrile   - cEFM/TOCO: Cat I tracing, no contractions   - CBC, T&S, T.Pal ordered   - UDS ordered. R/B/A discussed with patient and patient agreeable   - Bile acids 13 21   - CMP unremarkable 21 and 21   - Compliant with Ursodiol   - Followed with MFM   - S/p Celestone 21 and 21   - IVF: LR @ 125mL/hr   - Plan of Induction: Cytotec 25 mcg PV   - Continue to monitor     cHTN (no meds)              - Blood pressure normotensive today              - Patient denies s/s preeclampsia     Anxiety              - Mood stable on Buspar     BMI 30       Patient Active Problem List    Diagnosis Date Noted    Sinus tachycardia 08/10/2020     Priority: High    Atypical chest pain 08/10/2020     Priority: High    36 weeks gestation of pregnancy 2021    35 weeks gestation of pregnancy 2021    Intrahepatic cholestasis of pregnancy 2021    34 weeks gestation of pregnancy 2021    Itching 2021     Bile acids ordered 21  Patient reports itching on whole body but also reports palms and soles.  Essential hypertension 02/10/2021    COVID-19 08/10/2020    Anxiety 08/10/2020    Dysmenorrhea 2012    Menorrhagia 2012       Plan discussed with Dr. Maurilio Lynch, who is agreeable.      Steroids given this admission: No    Risks, benefits, alternatives and possible complications have been discussed in detail with the patient. Admission, and post admission procedures and expectations were discussed in detail. All questions were answered. Attending's Name: Dr. Janis Patino DO  Ob/Gyn Resident  7/30/2021, 10:56 AM    Resident Physician Statement  I have discussed the case, including pertinent history and exam findings with the above resident. I have personally seen the patient. I agree with the assessment, plan and orders as documented. I have made changes to the above note as needed. I have discussed the case with above named attending.        Aisha Ariza DO  OB/GYN Resident PGY4  7/30/2021  4:09 PM

## 2021-07-30 NOTE — PROGRESS NOTES
Labor Progress Note    Areli Alvarez is a 23 y.o. female  at 36w0d  The patient was seen and examined. Her pain is well controlled. She reports fetal movement is present, denies contractions, denies loss of fluid, denies vaginal bleeding. SVE performed. Vital Signs:  Vitals:    21 1044 21 1606   BP: 128/79 123/65   Pulse: 104 89   Resp: 18 20   Temp: 98.6 °F (37 °C) 98.6 °F (37 °C)   TempSrc: Oral    SpO2: 100%    Weight: 158 lb (71.7 kg)    Height: 5' 1\" (1.549 m)          FHT: 135 bpm, moderate variability, accelerations present, decelerations absent  Contractions: uterine irritability    Chaperone for Intimate Exam: Chaperone was present for entire exam, Chaperone Name: Maricel Lorenz RN  Cervical Exam: 3/50/-2  Pitocin: @ 0 mu/min    Membranes: Intact  Scalp Electrode in place: absent  Intrauterine Pressure Catheter in Place: absent    Interventions: none    Assessment/Plan:  Areli Alvarez is a 23 y.o. female  at 36w0d admitted for IOL 2/2 ICP   - GBS negative, No indication for GBS prophylaxis   - VSS, afebrile   - SVE: 3/50/-2   - cEFM/TOCO: Cat I, uterine irritability   - S/p Cytotec 25 PV x1   - S/p Cytotec 50 BU x1   - Continue to monitor closely    ICP   - Continue Ursodiol TID    Anxiety/Depression   - Continue Buspar 5mg BID    Attending updated and in agreement with plan    Carlos Lau DO  Ob/Gyn Resident  2021, 5:03 PM      Senior Residents Attestation Statement  I was present with the resident physician during the history and exam. I discussed the findings and plans with the resident physician and agree as documented in her note.      Kimberley Parker DO, PGY-3  Ob/Gyn Resident  Pager: [phone] [address]  2021 8:58 PM

## 2021-07-31 ENCOUNTER — ANESTHESIA EVENT (OUTPATIENT)
Dept: LABOR AND DELIVERY | Age: 26
End: 2021-07-31
Payer: COMMERCIAL

## 2021-07-31 ENCOUNTER — ANESTHESIA (OUTPATIENT)
Dept: LABOR AND DELIVERY | Age: 26
End: 2021-07-31
Payer: COMMERCIAL

## 2021-07-31 LAB
ALBUMIN SERPL-MCNC: 3 G/DL (ref 3.5–5.2)
ALBUMIN/GLOBULIN RATIO: 1 (ref 1–2.5)
ALP BLD-CCNC: 272 U/L (ref 35–104)
ALT SERPL-CCNC: 38 U/L (ref 5–33)
ANION GAP SERPL CALCULATED.3IONS-SCNC: 11 MMOL/L (ref 9–17)
AST SERPL-CCNC: 22 U/L
BILIRUB SERPL-MCNC: 0.3 MG/DL (ref 0.3–1.2)
BUN BLDV-MCNC: 6 MG/DL (ref 6–20)
BUN/CREAT BLD: ABNORMAL (ref 9–20)
CALCIUM SERPL-MCNC: 8.4 MG/DL (ref 8.6–10.4)
CHLORIDE BLD-SCNC: 108 MMOL/L (ref 98–107)
CO2: 20 MMOL/L (ref 20–31)
CREAT SERPL-MCNC: 0.45 MG/DL (ref 0.5–0.9)
CREATININE URINE: 78.8 MG/DL (ref 28–217)
GFR AFRICAN AMERICAN: >60 ML/MIN
GFR NON-AFRICAN AMERICAN: >60 ML/MIN
GFR SERPL CREATININE-BSD FRML MDRD: ABNORMAL ML/MIN/{1.73_M2}
GFR SERPL CREATININE-BSD FRML MDRD: ABNORMAL ML/MIN/{1.73_M2}
GLUCOSE BLD-MCNC: 77 MG/DL (ref 70–99)
POTASSIUM SERPL-SCNC: 3.7 MMOL/L (ref 3.7–5.3)
SODIUM BLD-SCNC: 139 MMOL/L (ref 135–144)
TOTAL PROTEIN, URINE: 7 MG/DL
TOTAL PROTEIN: 6 G/DL (ref 6.4–8.3)
URINE TOTAL PROTEIN CREATININE RATIO: 0.09 (ref 0–0.2)

## 2021-07-31 PROCEDURE — 88307 TISSUE EXAM BY PATHOLOGIST: CPT

## 2021-07-31 PROCEDURE — 7200000001 HC VAGINAL DELIVERY

## 2021-07-31 PROCEDURE — 36415 COLL VENOUS BLD VENIPUNCTURE: CPT

## 2021-07-31 PROCEDURE — 1220000000 HC SEMI PRIVATE OB R&B

## 2021-07-31 PROCEDURE — 10907ZC DRAINAGE OF AMNIOTIC FLUID, THERAPEUTIC FROM PRODUCTS OF CONCEPTION, VIA NATURAL OR ARTIFICIAL OPENING: ICD-10-PCS | Performed by: OBSTETRICS & GYNECOLOGY

## 2021-07-31 PROCEDURE — 80053 COMPREHEN METABOLIC PANEL: CPT

## 2021-07-31 PROCEDURE — 3700000025 EPIDURAL BLOCK: Performed by: ANESTHESIOLOGY

## 2021-07-31 PROCEDURE — 2580000003 HC RX 258

## 2021-07-31 PROCEDURE — 6360000002 HC RX W HCPCS: Performed by: NURSE ANESTHETIST, CERTIFIED REGISTERED

## 2021-07-31 PROCEDURE — 59400 OBSTETRICAL CARE: CPT | Performed by: OBSTETRICS & GYNECOLOGY

## 2021-07-31 PROCEDURE — 96374 THER/PROPH/DIAG INJ IV PUSH: CPT

## 2021-07-31 PROCEDURE — 59050 FETAL MONITOR W/REPORT: CPT

## 2021-07-31 PROCEDURE — 6370000000 HC RX 637 (ALT 250 FOR IP)

## 2021-07-31 PROCEDURE — 2500000003 HC RX 250 WO HCPCS: Performed by: NURSE ANESTHETIST, CERTIFIED REGISTERED

## 2021-07-31 PROCEDURE — 6360000002 HC RX W HCPCS: Performed by: STUDENT IN AN ORGANIZED HEALTH CARE EDUCATION/TRAINING PROGRAM

## 2021-07-31 RX ORDER — SIMETHICONE 80 MG
80 TABLET,CHEWABLE ORAL EVERY 6 HOURS PRN
Status: DISCONTINUED | OUTPATIENT
Start: 2021-07-31 | End: 2021-08-02 | Stop reason: HOSPADM

## 2021-07-31 RX ORDER — NALOXONE HYDROCHLORIDE 0.4 MG/ML
0.4 INJECTION, SOLUTION INTRAMUSCULAR; INTRAVENOUS; SUBCUTANEOUS PRN
Status: DISCONTINUED | OUTPATIENT
Start: 2021-07-31 | End: 2021-07-31

## 2021-07-31 RX ORDER — ROPIVACAINE HYDROCHLORIDE 2 MG/ML
INJECTION, SOLUTION EPIDURAL; INFILTRATION; PERINEURAL PRN
Status: DISCONTINUED | OUTPATIENT
Start: 2021-07-31 | End: 2021-07-31 | Stop reason: SDUPTHER

## 2021-07-31 RX ORDER — LANOLIN 72 %
OINTMENT (GRAM) TOPICAL PRN
Status: DISCONTINUED | OUTPATIENT
Start: 2021-07-31 | End: 2021-08-02 | Stop reason: HOSPADM

## 2021-07-31 RX ORDER — ROPIVACAINE HYDROCHLORIDE 2 MG/ML
INJECTION, SOLUTION EPIDURAL; INFILTRATION; PERINEURAL CONTINUOUS PRN
Status: DISCONTINUED | OUTPATIENT
Start: 2021-07-31 | End: 2021-07-31 | Stop reason: SDUPTHER

## 2021-07-31 RX ORDER — ONDANSETRON 2 MG/ML
4 INJECTION INTRAMUSCULAR; INTRAVENOUS EVERY 4 HOURS PRN
Status: DISCONTINUED | OUTPATIENT
Start: 2021-07-31 | End: 2021-08-02 | Stop reason: HOSPADM

## 2021-07-31 RX ORDER — LIDOCAINE HYDROCHLORIDE AND EPINEPHRINE 15; 5 MG/ML; UG/ML
INJECTION, SOLUTION EPIDURAL PRN
Status: DISCONTINUED | OUTPATIENT
Start: 2021-07-31 | End: 2021-07-31 | Stop reason: SDUPTHER

## 2021-07-31 RX ORDER — IBUPROFEN 600 MG/1
600 TABLET ORAL EVERY 6 HOURS
Status: DISCONTINUED | OUTPATIENT
Start: 2021-07-31 | End: 2021-08-02 | Stop reason: HOSPADM

## 2021-07-31 RX ORDER — IBUPROFEN 600 MG/1
600 TABLET ORAL EVERY 6 HOURS PRN
Qty: 30 TABLET | Refills: 1 | Status: SHIPPED | OUTPATIENT
Start: 2021-07-31 | End: 2021-08-22 | Stop reason: ALTCHOICE

## 2021-07-31 RX ORDER — CALCIUM CARBONATE 200(500)MG
500 TABLET,CHEWABLE ORAL 3 TIMES DAILY PRN
Status: DISCONTINUED | OUTPATIENT
Start: 2021-07-31 | End: 2021-08-02 | Stop reason: HOSPADM

## 2021-07-31 RX ORDER — NALBUPHINE HCL 10 MG/ML
5 AMPUL (ML) INJECTION EVERY 4 HOURS PRN
Status: DISCONTINUED | OUTPATIENT
Start: 2021-07-31 | End: 2021-07-31

## 2021-07-31 RX ORDER — DOCUSATE SODIUM 100 MG/1
100 CAPSULE, LIQUID FILLED ORAL 2 TIMES DAILY
Qty: 60 CAPSULE | Refills: 1 | Status: SHIPPED | OUTPATIENT
Start: 2021-07-31 | End: 2021-08-30

## 2021-07-31 RX ORDER — DOCUSATE SODIUM 100 MG/1
100 CAPSULE, LIQUID FILLED ORAL 2 TIMES DAILY
Status: DISCONTINUED | OUTPATIENT
Start: 2021-07-31 | End: 2021-08-02 | Stop reason: HOSPADM

## 2021-07-31 RX ORDER — ROPIVACAINE HYDROCHLORIDE 2 MG/ML
INJECTION, SOLUTION EPIDURAL; INFILTRATION; PERINEURAL
Status: COMPLETED
Start: 2021-07-31 | End: 2021-07-31

## 2021-07-31 RX ORDER — ACETAMINOPHEN 500 MG
1000 TABLET ORAL EVERY 6 HOURS PRN
Status: DISCONTINUED | OUTPATIENT
Start: 2021-07-31 | End: 2021-08-02 | Stop reason: HOSPADM

## 2021-07-31 RX ORDER — SODIUM CHLORIDE, SODIUM LACTATE, POTASSIUM CHLORIDE, CALCIUM CHLORIDE 600; 310; 30; 20 MG/100ML; MG/100ML; MG/100ML; MG/100ML
INJECTION, SOLUTION INTRAVENOUS CONTINUOUS
Status: DISCONTINUED | OUTPATIENT
Start: 2021-07-31 | End: 2021-08-02 | Stop reason: HOSPADM

## 2021-07-31 RX ORDER — BISACODYL 10 MG
10 SUPPOSITORY, RECTAL RECTAL DAILY PRN
Status: DISCONTINUED | OUTPATIENT
Start: 2021-07-31 | End: 2021-08-02 | Stop reason: HOSPADM

## 2021-07-31 RX ORDER — ONDANSETRON 2 MG/ML
4 INJECTION INTRAMUSCULAR; INTRAVENOUS EVERY 6 HOURS PRN
Status: DISCONTINUED | OUTPATIENT
Start: 2021-07-31 | End: 2021-07-31

## 2021-07-31 RX ORDER — DIAPER,BRIEF,INFANT-TODD,DISP
EACH MISCELLANEOUS
Status: DISCONTINUED | OUTPATIENT
Start: 2021-07-31 | End: 2021-08-02 | Stop reason: HOSPADM

## 2021-07-31 RX ADMIN — NALBUPHINE HYDROCHLORIDE 10 MG: 10 INJECTION, SOLUTION INTRAMUSCULAR; INTRAVENOUS; SUBCUTANEOUS at 00:18

## 2021-07-31 RX ADMIN — LIDOCAINE HYDROCHLORIDE,EPINEPHRINE BITARTRATE 3 ML: 15; .005 INJECTION, SOLUTION EPIDURAL; INFILTRATION; INTRACAUDAL; PERINEURAL at 07:51

## 2021-07-31 RX ADMIN — IBUPROFEN 600 MG: 600 TABLET, FILM COATED ORAL at 18:56

## 2021-07-31 RX ADMIN — SODIUM CHLORIDE, POTASSIUM CHLORIDE, SODIUM LACTATE AND CALCIUM CHLORIDE: 600; 310; 30; 20 INJECTION, SOLUTION INTRAVENOUS at 11:00

## 2021-07-31 RX ADMIN — ROPIVACAINE HYDROCHLORIDE 10 ML/HR: 2 INJECTION, SOLUTION EPIDURAL; INFILTRATION at 08:03

## 2021-07-31 RX ADMIN — Medication 87.3 MILLI-UNITS/MIN: at 11:00

## 2021-07-31 RX ADMIN — IBUPROFEN 600 MG: 600 TABLET, FILM COATED ORAL at 12:57

## 2021-07-31 RX ADMIN — ROPIVACAINE HYDROCHLORIDE 5 MG: 2 INJECTION, SOLUTION EPIDURAL; INFILTRATION; PERINEURAL at 07:57

## 2021-07-31 RX ADMIN — ROPIVACAINE HYDROCHLORIDE 5 MG: 2 INJECTION, SOLUTION EPIDURAL; INFILTRATION; PERINEURAL at 07:52

## 2021-07-31 ASSESSMENT — PAIN SCALES - GENERAL
PAINLEVEL_OUTOF10: 3
PAINLEVEL_OUTOF10: 8
PAINLEVEL_OUTOF10: 4

## 2021-07-31 NOTE — CARE COORDINATION
POST-PARTUM TRANSITIONAL CARE PLAN    36 weeks gestation of pregnancy [Z3A.36]    Writer met w/ Pierre at bedside to discuss DCP. Francisco Lenznerisraven is S/P  on 2021    Infant name on BC: Everannamaria Baer. Infant to WIN. Infant PCP Undecided. List given. FOB: Emilie Singh verified name/address/phone number correct on [employer] correct. Writer notified dad he has 30 days from date of birth to add AJ to insurance policy. Father verbalized understanding. He is adding AJ to his [facility] Choice Plus. His BD is 4/15/[birth year]. Mom will keep her Alex Peace as a dependent through her father. Pierre verbalized has/have all necessary items for AJ. No previous home care or dme. No Home Care or DME anticipated. Anticipate DC of couplet 2021    CM continue to follow for any DC needs.     OB: Dr Niocle Kebede

## 2021-07-31 NOTE — L&D DELIVERY NOTE
Mother's Information    Labor Events     labor?: Yes  Rupture type: Artificial=AROM  Fluid color: Clear  Fluid odor: None     Mother Delivery Information    Episiotomy: None  Lacerations: None  Repair Suture: None  Surgical or Additional Est. Blood Loss (mL): 0 (View Only): Edit in Flowsheets   Combined Est. Blood Loss (mL): 0        Maylin Trejo [[id number]]    Labor Events     labor?: Yes   steroids?: Full Course  Cervical ripening date/time:     Cervical ripening type: Misoprostol  Antibiotics received during labor?: No  Rupture date/time: 21 05:54:00   Rupture type: Artificial=AROM  Fluid color: Clear  Fluid odor: None  Induction: Misoprostol  Indications for induction: Medical  Augmentation: Oxytocin  Indications for augmentation: Ineffective Contraction Pattern          Labor Event Times    Labor onset date/time:     Dilation complete date/time:  21 1000   Start pushin2021 1027   Decision time (emergent ):        Anesthesia    Method: Epidural     Assisted Delivery Details    Forceps attempted?: No  Vacuum extractor attempted?: No     Document Additional Attempt       Document Additional Attempt             Shoulder Dystocia    Shoulder dystocia present?: No  Add Second Maneuver  Add Third Maneuver  Add Fourth Maneuver  Add Fifth Maneuver  Add Sixth Maneuver  Add Seventh Maneuver  Add Eighth Maneuver  Add Ninth Maneuver     [place] Presentation    Presentation: Vertex  Position: Left  _: Occiput  _: Anterior     [place] Information    Head delivery date/time: 2021 10:44:00   Changing the 's delivery date/time could affect patient care.:    Delivery date/time:  21 1044   Delivery type: Vaginal, Spontaneous    Details:        Delivery Providers    Delivering clinician: Donna Sanchez DO   Provider Role    Donna Sanchez DO Obstetrician    Priti Fierro Resident    Gibson Fried RN Delivery Nurse    Lavell Castanon RN Delivery

## 2021-07-31 NOTE — ANESTHESIA PROCEDURE NOTES
Epidural Block    Patient location during procedure: OB  Start time: 7/31/2021 7:48 AM  End time: 7/31/2021 7:55 AM  Reason for block: labor epidural  Staffing  Resident/CRNA: TERRENCE Ontiveros - CRNA  Preanesthetic Checklist  Completed: patient identified, IV checked, site marked, risks and benefits discussed, monitors and equipment checked, pre-op evaluation, timeout performed, anesthesia consent given, oxygen available and patient being monitored  Epidural  Patient position: sitting  Prep: Betadine  Patient monitoring: continuous pulse ox and frequent blood pressure checks  Approach: midline  Location: lumbar (1-5)  Injection technique: RADHA air  Provider prep: mask and sterile gloves  Needle  Needle type: Tuohy   Needle gauge: 17 G  Needle length: 3.5 in  Needle insertion depth: 6 cm  Catheter type: side hole  Catheter size: 19 G  Catheter at skin depth: 12 cm  Test dose: negative  Assessment  Sensory level: T6  Hemodynamics: stable  Attempts: 1

## 2021-07-31 NOTE — LACTATION NOTE
Packet of breastfeeding information given. Reviewed feeding pattern expectations for the first 48 hours. Encouraged to attempt breast if baby alerts easily. If baby is sleepy mom is to pump and give her baby a bottle of pumped milk and/or formula.

## 2021-07-31 NOTE — CARE COORDINATION
Initial Discharge Plannin y.o. Justine Fallon at 36w0d who presents for IOL secondary to ICP. S/p Celelstone  and . Follows with MFM. PLAN:   Vs per unit protocol  cEFM/TOCO   CBC, T&S, T.Pal  UDS   IVF: LR @ 125mL/hr  Induction: Cytotec 25 mcg PV    Anticipate  and LOS 2 days. No HC or DME needs present at this time. Will meet with patient once delivered to evaluate further.

## 2021-07-31 NOTE — ANESTHESIA POSTPROCEDURE EVALUATION
Department of Anesthesiology  Postprocedure Note    Patient: Debbie Baugh  MRN: [id number]  [place]: 1995  Date of evaluation: 7/31/2021  Time:  12:17 PM     Procedure Summary     Date: 07/31/21 Room / Location:     Anesthesia Start: 0748 Anesthesia Stop: 6349    Procedure: Labor Analgesia Diagnosis:     Scheduled Providers:  Responsible Provider: Marilou Prescott MD    Anesthesia Type: epidural ASA Status: 2          Anesthesia Type: epidural    Jonsa Phase I: Jonas Score: 9    Jonas Phase II:      Last vitals: Reviewed and per EMR flowsheets.        Anesthesia Post Evaluation    Patient location during evaluation: floor  Patient participation: complete - patient participated  Level of consciousness: awake and alert  Pain score: 0  Airway patency: patent  Nausea & Vomiting: no nausea and no vomiting  Complications: no  Cardiovascular status: blood pressure returned to baseline  Respiratory status: acceptable  Hydration status: euvolemic

## 2021-07-31 NOTE — PROGRESS NOTES
Labor Progress Note    Karo Multani is a 23 y.o. female  at 43w3d  The patient was seen and examined. Her pain is well controlled. She reports fetal movement is present, complains of contractions, complains of loss of fluid, denies vaginal bleeding.        Vital Signs:  Vitals:    21 0805 21 0810 21 0815 21 0830   BP: 128/81 127/75 130/71 125/70   Pulse: 79 67 68 63   Resp:    18   Temp:       TempSrc:       SpO2: 99% 99% 99% 98%   Weight:       Height:             FHT: 125, moderate variability, accelerations present, two 4 minute prolonged decelerations to rhonda of 80 and late decelerations noted which resolved with position changes, IVF bolus, and turning pitocin off  Contractions: regular, every 2-3 minutes    Chaperone for Intimate Exam: Chaperone was present for entire exam, Chaperone Name: Sarah Villegas RN  Cervical Exam: 8 cm dilated, 70 effaced, 0 station  Pitocin: @ 0 mu/min    Membranes: Ruptured clear fluid  Scalp Electrode in place: present  Intrauterine Pressure Catheter in Place: present    Interventions: FSE placed, pitocin turned off, position changes, and IVF bolus for category 2 tracing    Assessment/Plan:  Karo Multani is a 23 y.o. female  at 43w3d admitted for IOL 2/2 ICP   - GBS negative, No indication for GBS prophylaxis   - VSS, afebrile   - cEFM/TOCO - Cat 2 tracing which resolved with intrauterine resuscitation (IVF bolus, position changes, pitocin turned off)   - SVE: /0   - FSE placed   - S/p Cytotec 25 PV x1, 50 buccal x1   - S/p Nubain x1   - S/p vazquez balloon   - S/p AROM   - Dr. Pauly Rick notified and en route to hospital, Dr. Duncan Gordillo at bedside   - Continue to monitor closely        Attending updated and en route    Dale Shepherd DO  Ob/Gyn Resident  2021, 9:58 AM

## 2021-07-31 NOTE — ANESTHESIA PRE PROCEDURE
Department of Anesthesiology  Preprocedure Note       Name:  Desean Gordon   Age:  23 y.o.  :  1995                                          MRN:  [id number]         Date:  2021      Surgeon: * No surgeons listed *    Procedure: * No procedures listed *    Medications prior to admission:   Prior to Admission medications    Medication Sig Start Date End Date Taking? Authorizing Provider   ursodiol (ACTIGALL) 300 MG capsule Take 1 capsule by mouth 3 times daily (before meals) 21 Yes Kaden Guzman DO   busPIRone (BUSPAR) 5 MG tablet Take 5 mg by mouth 2 times daily 18  Yes Historical Provider, MD   Prenatal Vit-Fe Fumarate-FA (PRENATAL VITAMIN PO) Take by mouth   Yes Historical Provider, MD   Fexofenadine HCl (ALLEGRA ALLERGY PO) Take by mouth    Yes Historical Provider, MD   Blood Pressure Monitoring ( WRIST CUFF BP MONITOR) MISC Please take BP before taking medications or when feeling lightheaded/dizzy.  21   Roxanne Moses DO       Current medications:    Current Facility-Administered Medications   Medication Dose Route Frequency Provider Last Rate Last Admin    lactated ringers infusion   Intravenous Continuous Edward Reas,  mL/hr at 21 1907 New Bag at 21 1907    lactated ringers bolus  500 mL Intravenous PRN Edward Reas, DO        Or    lactated ringers bolus  1,000 mL Intravenous PRN Edward Reas, DO        sodium chloride flush 0.9 % injection 5-40 mL  5-40 mL Intravenous 2 times per day Edward Reas, DO        sodium chloride flush 0.9 % injection 5-40 mL  5-40 mL Intravenous PRN Christianne Boleratz, DO        0.9 % sodium chloride infusion  25 mL Intravenous PRN Christianne Boleratz, DO        lidocaine PF 1 % injection 30 mL  30 mL Other PRN Christianne Boleratz, DO        ondansetron (ZOFRAN) injection 4 mg  4 mg Intravenous Q6H PRN Christianne Boleratz, DO        diphenhydrAMINE (BENADRYL) tablet 25 mg  25 mg Oral Q4H PRN Edward Reas, DO        acetaminophen (TYLENOL) tablet 1,000 mg  1,000 mg Oral Q6H PRN Chyrl Friendly, DO        busPIRone (BUSPAR) tablet 5 mg  5 mg Oral BID Chyrl Friendly, DO        ursodiol (ACTIGALL) tablet 250 mg  250 mg Oral TID Chyrl Friendly, DO        calcium carbonate (TUMS) chewable tablet 500 mg  500 mg Oral TID PRN Chyrl Friendly, DO   500 mg at 07/30/21 2159    oxytocin (PITOCIN) 30 units in 500 mL infusion  1-20 eryn-units/min Intravenous Continuous Christianne Boleratz, DO 4 mL/hr at 07/31/21 0330 4 eryn-units/min at 07/31/21 0330       Allergies:     Allergies   Allergen Reactions    Amoxil [Amoxicillin] Swelling     Reported throat swelling        Problem List:    Patient Active Problem List   Diagnosis Code    Menorrhagia N92.0    Dysmenorrhea N94.6    COVID-19 U07.1    Anxiety F41.9    Sinus tachycardia R00.0    Atypical chest pain R07.89    Essential hypertension I10    34 weeks gestation of pregnancy Z3A.34    Itching L29.9    Intrahepatic cholestasis of pregnancy O26.619, K83.1    35 weeks gestation of pregnancy Z3A.35    36 weeks gestation of pregnancy Z3A.36       Past Medical History:        Diagnosis Date    Dysmenorrhea     Menorrhagia        Past Surgical History:        Procedure Laterality Date    ENDOMETRIAL BIOPSY  5/5/16    INTRAUTERINE DEVICE INSERTION  5/5/16     [facility]    INTRAUTERINE DEVICE REMOVAL  03/14/2017       Social History:    Social History     Tobacco Use    Smoking status: Never Smoker    Smokeless tobacco: Never Used   Substance Use Topics    Alcohol use: Not Currently     Alcohol/week: 0.0 standard drinks     Comment: once a month                                Counseling given: Not Answered      Vital Signs (Current):   Vitals:    07/31/21 0500 07/31/21 0530 07/31/21 0600 07/31/21 0630   BP: 125/75 (!) 142/83 135/85 (!) 145/86   Pulse: 74 70 75 87   Resp: 20 20 20 20   Temp:       TempSrc:       SpO2:       Weight:       Height: BP Readings from Last 3 Encounters:   07/31/21 (!) 145/86   07/29/21 120/70   07/28/21 116/69       NPO Status:                                                                                 BMI:   Wt Readings from Last 3 Encounters:   07/30/21 158 lb (71.7 kg)   07/29/21 158 lb (71.7 kg)   07/28/21 158 lb (71.7 kg)     Body mass index is 29.85 kg/m². CBC:   Lab Results   Component Value Date    WBC 17.3 07/30/2021    RBC 4.59 07/30/2021    HGB 11.2 07/30/2021    HCT 35.4 07/30/2021    MCV 77.1 07/30/2021    RDW 15.6 07/30/2021     07/30/2021       CMP:   Lab Results   Component Value Date     07/26/2021    K 3.9 07/26/2021    CL 98 07/26/2021    CO2 23 07/26/2021    BUN 6 07/26/2021    CREATININE 0.52 07/26/2021    GFRAA >60 07/26/2021    LABGLOM >60 07/26/2021    GLUCOSE 115 07/26/2021    PROT 6.5 07/26/2021    CALCIUM 8.9 07/26/2021    BILITOT 0.48 07/26/2021    ALKPHOS 329 07/26/2021    AST 23 07/26/2021    ALT 31 07/26/2021       POC Tests: No results for input(s): POCGLU, POCNA, POCK, POCCL, POCBUN, POCHEMO, POCHCT in the last 72 hours.     Coags:   Lab Results   Component Value Date    PROTIME 10.8 08/11/2020    INR 1.0 08/11/2020    APTT 28.5 08/11/2020       HCG (If Applicable):   Lab Results   Component Value Date    HCGQUANT 421 (H) 12/21/2020        ABGs: No results found for: PHART, PO2ART, HHF2MEC, NAK6TKJ, BEART, S1KPOXYF     Type & Screen (If Applicable):  No results found for: LABABO, LABRH    Drug/Infectious Status (If Applicable):  No results found for: HIV, HEPCAB    COVID-19 Screening (If Applicable):   Lab Results   Component Value Date    COVID19 Not Detected 07/30/2021    COVID19 Not Detected 08/20/2020           Anesthesia Evaluation  Patient summary reviewed and Nursing notes reviewed no history of anesthetic complications:   Airway: Mallampati: II  TM distance: >3 FB   Neck ROM: full  Mouth opening: > = 3 FB Dental:          Pulmonary:normal exam  breath sounds clear to auscultation                             Cardiovascular:    (+) hypertension:,         Rhythm: regular  Rate: normal                    Neuro/Psych:   (+) depression/anxiety             GI/Hepatic/Renal:   (+) liver disease:,           Endo/Other:                      ROS comment: Pregnancy Abdominal:             Vascular: Other Findings:             Anesthesia Plan      epidural     ASA 2             Anesthetic plan and risks discussed with patient.                       José Miguel Foster MD   7/31/2021

## 2021-07-31 NOTE — PROGRESS NOTES
Labor Progress Note    Shanel Dalton is a 23 y.o. female  at 43w3d  The patient was seen and examined. Her pain is well controlled. She reports fetal movement is present, complains of contractions, denies loss of fluid, denies vaginal bleeding. AROM performed, clear fluid noted. Vital Signs:  Vitals:    21 0530   BP: (!) 142/83   Pulse: 70   Resp: 20   Temp:    SpO2:          FHT: 140, moderate variability, accelerations present, decelerations absent  Contractions: regular, every 3 minutes    Chaperone for Intimate Exam: Chaperone was present for entire exam, Chaperone Name: Melina Yates RN  Cervical Exam:   Pitocin: @ 4 mu/min    Membranes: Ruptured clear fluid  Scalp Electrode in place: absent  Intrauterine Pressure Catheter in Place: absent    Interventions: AROM clear @ 8280    Assessment/Plan:  Shanel Dalton is a 23 y.o. female  at 36w1d admitted for IOL 2/2 ICP   - GBS negative, No indication for GBS prophylaxis   - VSS, afebrile   - cEFM/TOCO   - SVE:    - S/p cytotec 25 PV x1, 50 BU x1   - S/p Nubain x1   - S/p vazquez balloon   - AROM clear @ 0541   - Continue with pitocin per protocol   - Continue to monitor    - cHTN   - BP stable with rare elevated value, no severe ranges   - Denies s/s of PreE   - Continue to monitor     - Anxiety   - Stable on Buspar    Attending updated and in agreement with plan    Annabelle Pearson DO  Ob/Gyn Resident  2021, 6:00 AM    Senior Residents Attestation Statement  I was present with the resident physician during the history and exam. I discussed the findings and plans with the resident physician and agree as documented in her note.      Rosi Hamilton DO, PGY-3  Ob/Gyn Resident  Pager: [phone] [facility], [address]  2021 6:10 AM

## 2021-07-31 NOTE — PROGRESS NOTES
Labor Progress Note    Gloria Escobedo is a 23 y.o. female  at 43w3d  The patient was seen and examined. Her pain is well controlled. She reports fetal movement is present, complains of contractions, denies loss of fluid, denies vaginal bleeding. Patient called out saying her vazquez balloon fell out. Repeat cervical exam was completed. Vital Signs:  Vitals:    21 0100 21 0130 21 0200 21 0230   BP: 125/76 139/81 (!) 144/94 (!) 107/57   Pulse: 66 66 81 73   Resp:  20 20 18   Temp:       TempSrc:       SpO2:       Weight:       Height:             FHT: 130, moderate variability, accelerations present, decelerations absent  Contractions: regular, every 2-4 minutes    Chaperone for Intimate Exam: Chaperone was present for entire exam, Chaperone Name: Ariel Gibson RN  Cervical Exam: /-1  Pitocin: @ 3 mu/min    Membranes: Intact  Scalp Electrode in place: absent  Intrauterine Pressure Catheter in Place: absent    Interventions: none    Assessment/Plan:  Gloria Escobedo is a 23 y.o. female  at 43w3d admitted for IOL 2/2 ICP   - GBS negative, No indication for GBS prophylaxis   - VSS, afebrile   - cEFM/TOCO: Cat I tracing, regular contractions q2-4 min   - SVE : /-1   - S/p Cytotec 25 PV x1   - S/p Cytotec 50 BU x1   - S/p Nubain x1   - S/p vazquez balloon   - Continue with pitocin per protocol   - Continue to monitor closely    - cHTN (no meds)   - BP stable, no severe ranges   - Denies s/s of PreE   - Continue to monitor    - Anxiety   - Stable on Buspar       Attending updated and in agreement with plan    Sindy Ramires DO  Ob/Gyn Resident  2021, 3:29 AM     Senior Residents Attestation Statement  I was present with the resident physician during the history and exam. I discussed the findings and plans with the resident physician and agree as documented in her note.      Madeline Tamez DO, PGY-3  Ob/Gyn Resident  Pager: [phone]  [address] [place] [address] [place]  7/31/2021 3:43 AM

## 2021-07-31 NOTE — PROGRESS NOTES
Labor Progress Note    Jen Fair is a 23 y.o. female  at 43w3d  The patient was seen and examined. Her pain is well controlled. She reports fetal movement is present, complains of contractions, denies loss of fluid, denies vaginal bleeding. She is resting comfortably. Vital Signs:  Vitals:    21 0030 21 0100 21 0130 21 0200   BP: 130/84 125/76 139/81 (!) 144/94   Pulse: 74 66 66 81   Resp: 20  20 20   Temp:       TempSrc:       SpO2:       Weight:       Height:             FHT: 130, moderate variability, accelerations present, decelerations absent  Contractions: regular, every 3 minutes    Pitocin: @ 3 mu/min    Membranes: Intact  Scalp Electrode in place: absent  Intrauterine Pressure Catheter in Place: absent    Interventions: none    Assessment/Plan:  Jen Fair is a 23 y.o. female  at 43w3d admitted for IOL 2/2 ICP   - GBS negative, No indication for GBS prophylaxis   - BP occasionally elevated, nonsevere range, otherwise VSS   - cEFM/TOCO: Cat I tracing, regular contractions q3 min   - S/p Cytotec 25 PV x1, 50 BU x1   - S/p Nubain x1   - Mg balloon in place   - Continue pitocin per protocol   - Continue to monitor    - cHTN (no meds)   - BP elevated occasionally, nonsevere   - Denies s/s of PreE   - Continue to monitor closely   - Baseline preE labs wnl 21, P/C of 0.06 on 3/7/21    - ICP   - Continue Ursodiol TID    - Anxiety   - Stable on Buspar, taking PRN    Attending updated and in agreement with plan    Maryse Chavira DO  Ob/Gyn Resident  2021, 2:34 AM       Senior Residents Attestation Statement  I was present with the resident physician during the history and exam. I discussed the findings and plans with the resident physician and agree as documented in her note.      Layton Cranker, DO, PGY-3  Ob/Gyn Resident  Pager: [phone]  [facility], [address] [place] [address]  2021 2:47 AM

## 2021-07-31 NOTE — FLOWSHEET NOTE
Patient admitted to room 750 from L&D via wheelchair. Oriented to room and surroundings. Plan of care reviewed. Verbalized understanding. Instructed on infant security and safe sleep practices. Preventing falls education provided . The following handouts given: A New Beginning: Your Guide to Postpartum Care, Rounding, gs Security System,Babies Cry A lot, Safe Sleep, Security and Visitation Guidelines. Call light placed within reach.

## 2021-07-31 NOTE — PROGRESS NOTES
note.     Layton Cranker, DO, PGY-3  Ob/Gyn Resident  Pager: [phone]  7/30/2021 10:55 PM

## 2021-08-01 PROBLEM — Z3A.36 36 WEEKS GESTATION OF PREGNANCY: Status: RESOLVED | Noted: 2021-07-30 | Resolved: 2021-08-01

## 2021-08-01 PROBLEM — Z3A.35 35 WEEKS GESTATION OF PREGNANCY: Status: RESOLVED | Noted: 2021-07-27 | Resolved: 2021-08-01

## 2021-08-01 PROBLEM — L29.9 ITCHING: Status: RESOLVED | Noted: 2021-07-19 | Resolved: 2021-08-01

## 2021-08-01 PROBLEM — Z3A.34 34 WEEKS GESTATION OF PREGNANCY: Status: RESOLVED | Noted: 2021-07-19 | Resolved: 2021-08-01

## 2021-08-01 PROBLEM — R07.89 ATYPICAL CHEST PAIN: Status: RESOLVED | Noted: 2020-08-10 | Resolved: 2021-08-01

## 2021-08-01 LAB
ABSOLUTE EOS #: 0.05 K/UL (ref 0–0.44)
ABSOLUTE IMMATURE GRANULOCYTE: 0.32 K/UL (ref 0–0.3)
ABSOLUTE LYMPH #: 3.6 K/UL (ref 1.1–3.7)
ABSOLUTE MONO #: 1.2 K/UL (ref 0.1–1.2)
BASOPHILS # BLD: 0 % (ref 0–2)
BASOPHILS ABSOLUTE: 0.07 K/UL (ref 0–0.2)
DIFFERENTIAL TYPE: ABNORMAL
EOSINOPHILS RELATIVE PERCENT: 0 % (ref 1–4)
HCT VFR BLD CALC: 34.6 % (ref 36.3–47.1)
HEMOGLOBIN: 10.7 G/DL (ref 11.9–15.1)
IMMATURE GRANULOCYTES: 2 %
LYMPHOCYTES # BLD: 19 % (ref 24–43)
MCH RBC QN AUTO: 24 PG (ref 25.2–33.5)
MCHC RBC AUTO-ENTMCNC: 30.9 G/DL (ref 28.4–34.8)
MCV RBC AUTO: 77.8 FL (ref 82.6–102.9)
MONOCYTES # BLD: 6 % (ref 3–12)
NRBC AUTOMATED: 0 PER 100 WBC
PDW BLD-RTO: 15.9 % (ref 11.8–14.4)
PLATELET # BLD: 392 K/UL (ref 138–453)
PLATELET ESTIMATE: ABNORMAL
PMV BLD AUTO: 9.9 FL (ref 8.1–13.5)
RBC # BLD: 4.45 M/UL (ref 3.95–5.11)
RBC # BLD: ABNORMAL 10*6/UL
SEG NEUTROPHILS: 72 % (ref 36–65)
SEGMENTED NEUTROPHILS ABSOLUTE COUNT: 13.69 K/UL (ref 1.5–8.1)
WBC # BLD: 18.9 K/UL (ref 3.5–11.3)
WBC # BLD: ABNORMAL 10*3/UL

## 2021-08-01 PROCEDURE — 6370000000 HC RX 637 (ALT 250 FOR IP)

## 2021-08-01 PROCEDURE — 1220000000 HC SEMI PRIVATE OB R&B

## 2021-08-01 PROCEDURE — 85025 COMPLETE CBC W/AUTO DIFF WBC: CPT

## 2021-08-01 PROCEDURE — 36415 COLL VENOUS BLD VENIPUNCTURE: CPT

## 2021-08-01 RX ADMIN — IBUPROFEN 600 MG: 600 TABLET, FILM COATED ORAL at 02:54

## 2021-08-01 RX ADMIN — BUSPIRONE HYDROCHLORIDE 5 MG: 5 TABLET ORAL at 22:23

## 2021-08-01 RX ADMIN — IBUPROFEN 600 MG: 600 TABLET, FILM COATED ORAL at 09:42

## 2021-08-01 RX ADMIN — IBUPROFEN 600 MG: 600 TABLET, FILM COATED ORAL at 22:23

## 2021-08-01 RX ADMIN — IBUPROFEN 600 MG: 600 TABLET, FILM COATED ORAL at 16:34

## 2021-08-01 RX ADMIN — DOCUSATE SODIUM 100 MG: 100 CAPSULE ORAL at 08:12

## 2021-08-01 RX ADMIN — ANTACID TABLETS 500 MG: 500 TABLET, CHEWABLE ORAL at 08:25

## 2021-08-01 ASSESSMENT — PAIN SCALES - GENERAL
PAINLEVEL_OUTOF10: 3
PAINLEVEL_OUTOF10: 4

## 2021-08-01 NOTE — PROGRESS NOTES
POST PARTUM DAY # 2    Desean Gordon is a 23 y.o. female PPD # 2 s/p  on 21  This patient was seen & examined. Today she is doing well without any chief complaint. Her pain is controlled with Motrin/Tylenol. Her lochia is light. She denies chest pain, shortness of breath, headache and blurred vision. She is  breast feeding and she denies any breast tenderness. She is ambulating well. Her voiding pattern is normal. I reviewed signs and symptoms of post partum depression with the patient, she currently denies any of these symptoms. She is tolerating solids. Her pregnancy was complicated by:   Patient Active Problem List   Diagnosis    COVID-19 (2020)    Anxiety    Sinus tachycardia    Essential hypertension    Intrahepatic cholestasis of pregnancy (G1)     21 M Apg 8/ Wt 4#13         Vital Signs:  Vitals:    21 0400 21 0800 21 1600 21 2030   BP: 122/89 112/72 121/79 126/81   Pulse: 62 69 75 80   Resp: 18 18     Temp: 98.3 °F (36.8 °C) 98.7 °F (37.1 °C) 98 °F (36.7 °C) 98.6 °F (37 °C)   TempSrc:       SpO2:  97%     Weight:       Height:             Physical Exam:  General:  awake, alert, cooperative, no apparent distress, and appears stated age  Neurologic:  alert, oriented, normal speech, no focal findings or movement disorder noted  Lungs:  No increased work of breathing, good air exchange, clear to auscultation bilaterally, no crackles or wheezing  Heart:  Normal apical impulse, regular rate and rhythm, normal S1 and S2, no S3 or S4, and no murmur noted    Abdomen: Soft, nontender, nondistended, bowel sounds present, no rebound, rigidity or guarding  Fundus: non-tender, firm, below umbilicus  Extremities:  no calf tenderness, non edematous    Lab:  Lab Results   Component Value Date    HGB 10.7 (L) 2021     Lab Results   Component Value Date    HCT 34.6 (L) 2021         Assessment/Plan:  1.  Desean Gordon is a  PPD # 2 s/p    - Doing

## 2021-08-01 NOTE — PROGRESS NOTES
POST PARTUM DAY # 1    Camilo Villalobos is a 23 y.o. female  This patient was seen & examined today.  on 21. Patient is doing well. She has no complaints at this time. Her pregnancy was complicated by:   Patient Active Problem List   Diagnosis    Menorrhagia    Dysmenorrhea    COVID-19    Anxiety    Sinus tachycardia    Atypical chest pain    Essential hypertension    34 weeks gestation of pregnancy    Itching    Intrahepatic cholestasis of pregnancy    35 weeks gestation of pregnancy    36 weeks gestation of pregnancy     21 M Apg 8/9 Wt 4#13     Today she is doing well without any chief complaint. Her lochia is light. She denies chest pain, shortness of breath, headache, lightheadedness and blurred vision. She is breast and bottle feeding and she denies any breast tenderness. She is ambulating well. Her voiding pattern is normal. I reviewed signs and symptoms of post partum depression with the patient, she currently denies any of these symptoms. She is tolerating solids.      Vital Signs:  Vitals:    21 1245 21 1300 21 1335 21 2050   BP: 127/80 121/76 127/72 119/79   Pulse: 73 72 74 68   Resp: 20 20 16 18   Temp:  98.4 °F (36.9 °C) 98.4 °F (36.9 °C) 98.7 °F (37.1 °C)   TempSrc:       SpO2: 96% 99% 100% 100%   Weight:       Height:         Physical Exam:  General:  no apparent distress, alert and cooperative  Neurologic:  alert, oriented, normal speech, no focal findings or movement disorder noted  Lungs:  No increased work of breathing, good air exchange, clear to auscultation bilaterally, no crackles or wheezing  Heart:  Normal apical impulse, regular rate and rhythm  Abdomen: abdomen soft, non-distended, non-tender  Fundus: non-tender, firm, below umbilicus  Extremities:  no calf tenderness, non edematous    Lab:  Lab Results   Component Value Date    HGB 11.2 (L) 2021     Lab Results   Component Value Date    HCT 35.4 (L) 2021 Assessment/Plan:  1. Areli Alvarez is a  PPD # 1 s/p    - Doing well, VSS   - male infant in [address], circumcision consent done   - Encourage ambulation   - Postpartum CBC this AM  2. Rh positive/Rubella immune  3. Breast and bottle feeding             - Denies s/s of mastitis  4. cHTN (no meds)             - PreE labs abnl (LFTs) slightly elevated ALT 38, P/C 0.09 ()             - Currently denies s/s of PreE at this time             - Bps non-severe range   5. Anxiety              - Mood stable              - On buspar 5mg PRN             - Denies SI/HI  6. BMI 29  7. Continue post partum care    Counseling Completed:  Secondary Smoke risks and Sudden Infant Death Syndrome were reviewed with recommendations. Infant sleeping, \"back to sleep\" and avoidance of co-sleeping recommendations were reviewed. Signs and Symptoms of Post Partum Depression were reviewed. The patient is to call if any occur. Signs and symptoms of Mastitis were reviewed. The patient is to call if any occur for follow up. Discharge instructions including pelvic rest, no driving with pain medicine and office follow-up were reviewed with patient     Attending Physician: Dr. Ivan Foster MD  Ob/Gyn Resident   2021, 3:14 AM     Senior Residents Attestation Statement  I was present with the resident physician during the history and exam. I discussed the findings and plans with the resident physician and agree as documented in her note     Alok Pearce DO  OB/GYN Resident  [facility]  2021 3:48 AM          Attending Physician Statement  I have discussed the care of Areli Alvarez, including pertinent history and exam findings,  with the resident. I have seen and examined the patient and the key elements of all parts of the encounter have been performed by me. I agree with the assessment, plan and orders as documented by the resident. (GC Modifier)    PT seen and examined. She is doing well without concern. Feels great after some rest.  Denies CP/SOB/F/CH/N/V/HA. She is ambulating, tolerating PO, voiding. She is concerned about PP depression but she does have therapist and she knows to reach out immediately for any concern. Her bleeding is light. Her pain is controlled. Vitals:    21 1300 21 1335 21 2050 21 0400   BP: 121/76 127/72 119/79 122/89   Pulse: 72 74 68 62   Resp: 20 16 18 18   Temp: 98.4 °F (36.9 °C) 98.4 °F (36.9 °C) 98.7 °F (37.1 °C) 98.3 °F (36.8 °C)   TempSrc:       SpO2: 99% 100% 100%    Weight:       Height:         Recent Results (from the past 24 hour(s))   CBC auto differential    Collection Time: 21  7:18 AM   Result Value Ref Range    WBC 18.9 (H) 3.5 - 11.3 k/uL    RBC 4.45 3.95 - 5.11 m/uL    Hemoglobin 10.7 (L) 11.9 - 15.1 g/dL    Hematocrit 34.6 (L) 36.3 - 47.1 %    MCV 77.8 (L) 82.6 - 102.9 fL    MCH 24.0 (L) 25.2 - 33.5 pg    MCHC 30.9 28.4 - 34.8 g/dL    RDW 15.9 (H) 11.8 - 14.4 %    Platelets 677 624 - 495 k/uL    MPV 9.9 8.1 - 13.5 fL    NRBC Automated 0.0 0.0 per 100 WBC    Differential Type NOT REPORTED     WBC Morphology NOT REPORTED     RBC Morphology ANISOCYTOSIS PRESENT     Platelet Estimate NOT REPORTED     Seg Neutrophils 72 (H) 36 - 65 %    Lymphocytes 19 (L) 24 - 43 %    Monocytes 6 3 - 12 %    Eosinophils % 0 (L) 1 - 4 %    Basophils 0 0 - 2 %    Immature Granulocytes 2 (H) 0 %    Segs Absolute 13.69 (H) 1.50 - 8.10 k/uL    Absolute Lymph # 3.60 1.10 - 3.70 k/uL    Absolute Mono # 1.20 0.10 - 1.20 k/uL    Absolute Eos # 0.05 0.00 - 0.44 k/uL    Basophils Absolute 0.07 0.00 - 0.20 k/uL    Absolute Immature Granulocyte 0.32 (H) 0.00 - 0.30 k/uL     PPD#1 , male, circ done  Rh+  CHTN no meds, VSS, no s/s of preeclampsia  Anxiety - no s/s of pp depression at this time. Continue Buspar and therapy. Counseled on s/s.   Breastfeeding    D/C             Discharge Instructions for Labor and Delivery, Vaginal Birth     A vaginal birth refers to the baby being delivered through the vagina. The amount of time that labor can take varies greatly. Labor for the average first-born baby is about 12 hours. Usually your hospital stay after a routine delivery is no more than two nights. Some new mothers go home the same day. Recovery from childbirth varies depending upon whether you had an episiotomy (an incision in the perineum, the area between your vaginal opening and your anus), the duration of labor and delivery, and the amount of rest you get. In general, it takes about 6-8 weeks for a woman's body to recover from childbirth. What You Will Need   Along with your medications, you will need the following:   · Sanitary pads    · Nursing pads    · Witch hazel pads    · Sitz bath    Steps to [address] will want to arrange for transportation home for you and your baby. The baby will need a car seat. You will receive instructions in the hospital for breastfeeding and taking care of the perineum area. You may use ointment for cracked nipples or warm water rinses to your perineum. · You will need to wear sanitary pads for about six weeks after delivery. · If you had an episiotomy or vaginal tear, you will be sent home with a plastic squirt bottle. Fill it with warm water and squirt over the vaginal and anal area every time you urinate and defecate. · Warm baths can be soothing to healing tissues. · Apply warm or cold cloths to sore breasts. · Apply ointment to cracked nipples. · Use nursing pads for leaky breast.    · Apply witch hazel pads to sore perineum (area between vagina and anus). · Ask your doctor about when it is safe for you to shower or bathe. · Sit in a sitz bath to soothe sore perineum and/or hemorrhoids. A sitz bath is soaking the hip and buttocks area in warm water. You can buy a plastic sitz bath at most [employer]. It will fit on your toilet. You can also use your bathtub. enough rest so you can recover. Try sleeping when the baby sleeps. · Ask your doctor when you can resume sexual relations. If you have not done so already, talk to your doctor about birth control options. · If you are breastfeeding, consider a breast pump. · Call your obstetrician and/or pediatrician for any questions that arise. · Understand the changes your body is going through as it recovers from childbirth:   ¨ Hot and cold flashes as your body adjusts to new hormone and blood flow levels   ¨ Urinary or fecal incontinence due to stretched muscles   ¨ After pains from uterine contractions as the uterus shrinks   ¨ Vaginal bleeding (called lochia), which is heavier than your period (generally stops within two months)   ¨ Baby blues, feelings of irritability, sadness, crying, or anxiety. Postpartum depression is more severe, occurring in 10%-20% of new moms. If you experience such symptoms, contact your doctor. · Consider joining a support group for new mothers. You can get encouragement and learn parenting strategies. Follow-up   Schedule a follow-up appointment as directed by your doctor. Call Your Doctor If Any of the Following Occurs   It is important for you to monitor your recovery once you leave the hospital. That way, you can alert your doctor to any problems immediately.  If any of the following occurs, call your doctor:   · Signs of infection, including fever and chills    · Increased bleeding: soaking more than one sanitary pad an hour    · Wounds that become red, swollen or drain pus    · Vaginal discharge that smells foul    · New pain, swelling, or tenderness in your legs    · Pain that you can't control with the medications you've been given    · Pain, burning, urgency or frequency of urination, or persistent bleeding in the urine    · Cough, shortness of breath, or chest pain    · Depression, suicidal thoughts, or feelings of harming your baby    · Breasts that are hot, red and accompanied by fever    · Any cracking or bleeding from the nipple or areola (the dark-colored area of the breast)      In case of an emergency, call 911 immediately.        Ermelinda Farr DO

## 2021-08-01 NOTE — PLAN OF CARE
Problem: Fluid Volume - Imbalance:  Goal: Absence of postpartum hemorrhage signs and symptoms  Description: Absence of postpartum hemorrhage signs and symptoms  Outcome: Ongoing     Problem: Infection - Intrapartum Infection:  Goal: Will show no infection signs and symptoms  Description: Will show no infection signs and symptoms  Outcome: Ongoing     Problem: Pain - Acute:  Goal: Pain level will decrease  Description: Pain level will decrease  Outcome: Ongoing     Problem: Pain:  Goal: Pain level will decrease  Description: Pain level will decrease  Outcome: Ongoing  Goal: Control of acute pain  Description: Control of acute pain  Outcome: Ongoing     Problem: Discharge Planning:  Goal: Discharged to appropriate level of care  Description: Discharged to appropriate level of care  Outcome: Ongoing     Problem: Constipation:  Goal: Bowel elimination is within specified parameters  Description: Bowel elimination is within specified parameters  Outcome: Ongoing     Problem: Infection - Risk of, Puerperal Infection:  Goal: Will show no infection signs and symptoms  Description: Will show no infection signs and symptoms  Outcome: Ongoing     Problem: Mood - Altered:  Goal: Mood stable  Description: Mood stable  Outcome: Ongoing

## 2021-08-02 VITALS
RESPIRATION RATE: 18 BRPM | WEIGHT: 158 LBS | SYSTOLIC BLOOD PRESSURE: 131 MMHG | TEMPERATURE: 98.5 F | DIASTOLIC BLOOD PRESSURE: 84 MMHG | HEIGHT: 61 IN | OXYGEN SATURATION: 99 % | HEART RATE: 60 BPM | BODY MASS INDEX: 29.83 KG/M2

## 2021-08-02 PROCEDURE — 6370000000 HC RX 637 (ALT 250 FOR IP)

## 2021-08-02 RX ADMIN — IBUPROFEN 600 MG: 600 TABLET, FILM COATED ORAL at 05:02

## 2021-08-02 RX ADMIN — IBUPROFEN 600 MG: 600 TABLET, FILM COATED ORAL at 12:08

## 2021-08-02 RX ADMIN — BUSPIRONE HYDROCHLORIDE 5 MG: 5 TABLET ORAL at 08:36

## 2021-08-02 RX ADMIN — ACETAMINOPHEN 1000 MG: 500 TABLET ORAL at 05:02

## 2021-08-02 ASSESSMENT — PAIN DESCRIPTION - LOCATION: LOCATION: ABDOMEN

## 2021-08-02 ASSESSMENT — PAIN DESCRIPTION - PAIN TYPE: TYPE: ACUTE PAIN

## 2021-08-02 ASSESSMENT — PAIN DESCRIPTION - DESCRIPTORS: DESCRIPTORS: ACHING;CRAMPING;DISCOMFORT

## 2021-08-02 ASSESSMENT — PAIN SCALES - GENERAL
PAINLEVEL_OUTOF10: 4
PAINLEVEL_OUTOF10: 4

## 2021-08-02 NOTE — LACTATION NOTE
Taught mom how to use the initiation phase on the Symphony breast pump. Placed lanolin in the 27 mm flanges. Mom noted it improved the comfort level.

## 2021-08-02 NOTE — PLAN OF CARE
Problem: Anxiety:  Goal: Level of anxiety will decrease  Description: Level of anxiety will decrease  Outcome: Met This Shift     Problem: Breathing Pattern - Ineffective:  Goal: Able to breathe comfortably  Description: Able to breathe comfortably  Outcome: Met This Shift     Problem: Fluid Volume - Imbalance:  Goal: Absence of imbalanced fluid volume signs and symptoms  Description: Absence of imbalanced fluid volume signs and symptoms  Outcome: Met This Shift  Goal: Absence of intrapartum hemorrhage signs and symptoms  Description: Absence of intrapartum hemorrhage signs and symptoms  Outcome: Met This Shift  Goal: Absence of postpartum hemorrhage signs and symptoms  Description: Absence of postpartum hemorrhage signs and symptoms  Outcome: Met This Shift     Problem: Infection - Intrapartum Infection:  Goal: Will show no infection signs and symptoms  Description: Will show no infection signs and symptoms  Outcome: Met This Shift     Problem: Labor Process - Prolonged:  Goal: Labor progression, first stage, within specified pattern  Description: Labor progression, first stage, within specified pattern  Outcome: Met This Shift  Goal: Labor progession, second stage, within specified pattern  Description: Labor progession, second stage, within specified pattern  Outcome: Met This Shift  Goal: Uterine contractions within specified parameters  Description: Uterine contractions within specified parameters  Outcome: Met This Shift     Problem:  Screening:  Goal: Ability to make informed decisions regarding treatment has improved  Description: Ability to make informed decisions regarding treatment has improved  Outcome: Met This Shift     Problem: Pain - Acute:  Goal: Pain level will decrease  Description: Pain level will decrease  Outcome: Met This Shift  Goal: Able to cope with pain  Description: Able to cope with pain  Outcome: Met This Shift     Problem: Tissue Perfusion - Uteroplacental, Altered:  Goal: Absence of abnormal fetal heart rate pattern  Description: Absence of abnormal fetal heart rate pattern  Outcome: Met This Shift     Problem: Urinary Retention:  Goal: Experiences of bladder distention will decrease  Description: Experiences of bladder distention will decrease  Outcome: Met This Shift  Goal: Urinary elimination within specified parameters  Description: Urinary elimination within specified parameters  Outcome: Met This Shift     Problem: Pain:  Goal: Pain level will decrease  Description: Pain level will decrease  Outcome: Met This Shift  Goal: Control of acute pain  Description: Control of acute pain  Outcome: Met This Shift  Goal: Control of chronic pain  Description: Control of chronic pain  Outcome: Met This Shift     Problem: Discharge Planning:  Goal: Discharged to appropriate level of care  Description: Discharged to appropriate level of care  Outcome: Met This Shift     Problem: Constipation:  Goal: Bowel elimination is within specified parameters  Description: Bowel elimination is within specified parameters  Outcome: Met This Shift     Problem: Infection - Risk of, Puerperal Infection:  Goal: Will show no infection signs and symptoms  Description: Will show no infection signs and symptoms  Outcome: Met This Shift     Problem: Mood - Altered:  Goal: Mood stable  Description: Mood stable  Outcome: Met This Shift

## 2021-08-02 NOTE — PROGRESS NOTES
POST PARTUM DAY # 2     Shanel Dalton is a 23 y.o. female PPD # 2 s/p  on 21  This patient was seen & examined. Today she is doing well without any chief complaint. Her pain is controlled with Motrin/Tylenol. Her lochia is light. She denies chest pain, shortness of breath, headache and blurred vision. She is  breast feeding and she denies any breast tenderness. She is ambulating well. Her voiding pattern is normal. I reviewed signs and symptoms of post partum depression with the patient, she currently denies any of these symptoms. She is tolerating solids.       Vital Signs:  BP: 133/86  HR: 63  Resp: 18  Temp: 98.6    Physical Exam:  General:  awake, alert, cooperative, no apparent distress, and appears stated age  Abdomen: Soft, nontender, nondistended, bowel sounds present, no rebound, rigidity or guarding  Fundus: non-tender, firm, below umbilicus  Extremities:  no calf tenderness, non edematous      Assessment/Plan:  1.  Shanel Dalton is a  PPD # 2 s/p               - Doing well, VSS              - male infant in [address], circumcision completed   - phototherapy on  overnight, will D/C mother this afternoon

## 2021-08-02 NOTE — LACTATION NOTE
Mom reports that breastfeeding, pumping, and bottlefeeding has been going well. Breastfeeding discharge reviewed. Storage bottles given. [address] [facility] visit offered. Mom will call as needed.

## 2021-08-02 NOTE — PROGRESS NOTES
CLINICAL PHARMACY NOTE: MEDS TO BEDS    Total # of Prescriptions Filled: 1   The following medications were delivered to the patient:  · Motrin 600mg tablet    Additional Documentation: medication delivered to the pt in room 750 on 08.02.21 at 10:10am, pt did not want the colace, said she had some at home.  Co pay was $1.88, paid with a card

## 2021-08-03 LAB — SURGICAL PATHOLOGY REPORT: NORMAL

## 2021-08-11 ENCOUNTER — TELEMEDICINE (OUTPATIENT)
Dept: OBGYN CLINIC | Age: 26
End: 2021-08-11

## 2021-08-11 PROCEDURE — 0503F POSTPARTUM CARE VISIT: CPT | Performed by: OBSTETRICS & GYNECOLOGY

## 2021-08-11 NOTE — PROGRESS NOTES
breastfeeding. Abdomen: Soft and non-tender; good bowel sounds; no guarding, rebound or rigidity; no CVA tenderness bilaterally. Patient participation included. (Viewed Virtually)    Extremities: No calf tenderness bilaterally. DTR 2/4 bilaterally. No edema. Patient participation included. (Viewed Virtually)    Perineum: Virtual Visit-Not Completed    Assessment:   Diagnosis Orders   1.  21 M Apg 8/9 Wt 4#13       No chief complaint on file. Patient Active Problem List    Diagnosis Date Noted    Sinus tachycardia 08/10/2020     Priority: High     21 M Apg 8/9 Wt 4#13 2021    Intrahepatic cholestasis of pregnancy (G1) 2021    Essential hypertension 02/10/2021    COVID-19 (2020) 08/10/2020    Anxiety 08/10/2020     Plan:  1. Return to the office in  3-4 weeks  2. Signs & Symptoms of mastitis reviewed; notify if occurs  3. Secondary smoke risks reviewed. Increased risks of respiratory problems, Sudden     infant death syndrome, and potential malignancies. 4. Abstinence  5. Family planning counseling and STD counseling completed - pt unsure  6. Return in about 4 weeks (around 2021) for postpartum. Debbie Baugh is a 23 y.o. female female was evaluated by a Virtual Visit (video visit) encounter to address concerns as mentioned above. A caregiver was present when appropriate. Due to this being a TeleHealth encounter (During [facility]- public health emergency), evaluation of the following organ systems was limited: Vitals/Constitutional/EENT/Resp/CV/GI//MS/Neuro/Skin/Heme-Lymph-Imm. Pursuant to the emergency declaration under the [address] authority and the [employer] and [employer] Act, this Virtual Visit was conducted with patient's (and/or legal guardian's) consent, to reduce the patient's risk of exposure to COVID-19 and provide necessary medical care.   The patient (and/or

## 2021-08-19 ENCOUNTER — TELEPHONE (OUTPATIENT)
Dept: OBGYN CLINIC | Age: 26
End: 2021-08-19

## 2021-08-19 NOTE — TELEPHONE ENCOUNTER
Patient called she is struggling with PP depression, she is seeing a therapist too that suggested you put her on something because she is breast feeding. She is currently taking Buspar (has been not helping), was ok with adding Zoloft if this is ok.  Has follow up appt sched on 9/13

## 2021-09-13 ENCOUNTER — POSTPARTUM VISIT (OUTPATIENT)
Dept: OBGYN CLINIC | Age: 26
End: 2021-09-13

## 2021-09-13 VITALS
DIASTOLIC BLOOD PRESSURE: 78 MMHG | BODY MASS INDEX: 27 KG/M2 | WEIGHT: 143 LBS | SYSTOLIC BLOOD PRESSURE: 122 MMHG | HEIGHT: 61 IN

## 2021-09-13 DIAGNOSIS — R53.83 OTHER FATIGUE: ICD-10-CM

## 2021-09-13 DIAGNOSIS — D50.8 OTHER IRON DEFICIENCY ANEMIA: ICD-10-CM

## 2021-09-13 DIAGNOSIS — T14.8XXA BRUISING: ICD-10-CM

## 2021-09-13 PROCEDURE — 0503F POSTPARTUM CARE VISIT: CPT | Performed by: OBSTETRICS & GYNECOLOGY

## 2021-09-13 RX ORDER — METOCLOPRAMIDE 10 MG/1
10 TABLET ORAL
Qty: 90 TABLET | Refills: 1
Start: 2021-09-13 | End: 2021-10-07

## 2021-09-13 NOTE — PROGRESS NOTES
Dante Franco  2021  5:05 PM        Dante Franco is a 23 y.o. female       The patient was seen. She has a myriad of complaints and concerns. She has been in and out of hospital with her son who has had failure to thrive and now has a possible viral infection. She delivered vaginally on 21. She is  breast feeding and there is not any signs or symptoms of mastitis. She has been concerned about breast milk supply. She is supposed to mix formula with rice milk with breast milk per pediatrician and this has been difficult. She is complaining of heavy bleeding with large clots. She has ad associated weakness and fatigue. She is very tired. She is concerned, as well, about random bruising on her arms that seems to be persistent. She has not intercourse. She is on buspar and sees her therapist regularly. Her depression and anxiety is slightly improved since last seen. The patient completed the E.P.D.S. Evaluation form and scored 6. She is coping well with her PP depression. She denies any suicidal thoughts with a plan, intent to harm others and delusional ideas. Today her lochia is heavy she denies any dizziness or shortness of breath. Her pregnancy was complicated by:  Patient Active Problem List    Diagnosis Date Noted    Sinus tachycardia 08/10/2020     Priority: High     21 M Apg 8/9 Wt 4#13 2021    Intrahepatic cholestasis of pregnancy (G1) 2021    Essential hypertension 02/10/2021    COVID-19 (2020) 08/10/2020    Anxiety 08/10/2020         She does admit to having good home support.       OB History    Para Term  AB Living   1 1 0 1 0 1   SAB TAB Ectopic Molar Multiple Live Births   0 0 0 0 0 1      # Outcome Date GA Lbr Von/2nd Weight Sex Delivery Anes PTL Lv   1  21 36w1d / 00:44 4 lb 13.3 oz (2.19 kg) M Vag-Spont EPI Y SELAM      Name: Naomia Simple: 8  Apgar5: 9       Patient Active Problem List   Diagnosis    COVID-19 (2020)    Anxiety    Sinus tachycardia    Essential hypertension    Intrahepatic cholestasis of pregnancy (G1)     21 M Apg 8/9 Wt 4#13       Blood pressure 122/78, height 5' 1\" (1.549 m), weight 143 lb (64.9 kg), last menstrual period 2020, currently breastfeeding. Abdomen: Soft and non-tender; good bowel sounds; no guarding, rebound or rigidity; no CVA tenderness bilaterally. Extremities: No calf tenderness bilaterally. DTR 2/4 bilaterally. No edema. Pulses tachycardic    Perineum: pt declined    Assessment:   Diagnosis Orders   1.  21 M Apg 8/9 Wt 4#13  CBC Auto Differential   2. Other iron deficiency anemia  Iron And TIBC    Ferritin    CBC Auto Differential   3. Bruising     4. Other fatigue       Chief Complaint   Patient presents with    Postpartum Care     Patient Active Problem List    Diagnosis Date Noted    Sinus tachycardia 08/10/2020     Priority: High     21 M Apg 8/9 Wt 4#13 2021    Intrahepatic cholestasis of pregnancy (G1) 2021    Essential hypertension 02/10/2021    COVID-19 (2020) 08/10/2020    Anxiety 08/10/2020         EPDS Score of 6        Plan:  1. Return to the office in 2-3 weeks  2. Signs & Symptoms of mastitis reviewed; notify if occurs  3. Secondary smoke risks reviewed. Increased risks of respiratory problems, Sudden     infant death syndrome, and potential malignancies. 4. Abstinence  5. Family planning counseling and STD counseling completed - Slynd  6. Return in about 3 weeks (around 10/4/2021) for Follow up heavy bleeding and anemia. 7.  Recommend extend maternity leave secondary to heavy bleeding and symptomatic anemia. Will start progesterone OCP, also labs ordered. 8.  Reglan ordered for inadequate breast milk production    Patient was seen with total face to face time of 20 minutes.  More than 50% of this visit was on counseling and education regarding her    Diagnosis Orders   1.  21 M Apg  Wt 4#13  CBC Auto Differential   2. Other iron deficiency anemia  Iron And TIBC    Ferritin    CBC Auto Differential   3. Bruising     4. Other fatigue      and her options. She was also counseled on her preventative health maintenance recommendations and follow-up.

## 2021-09-13 NOTE — LETTER
[facility] [address]  Phone: [phone]  Fax: [phone]    Sadietresa Nina        September 13, 2021     Patient: Sudhir Alicia   YOB: 1995   Date of Visit: 9/13/2021       To Whom it May Concern:    Sudhir Alicia was seen in my clinic on 9/13/2021. She is extending her leave from delivery due to heavy post partum bleeding causing symptomatic anemia, she may return to work on 10/04/2021 with no restrictions. If you have any questions or concerns, please don't hesitate to call.     Sincerely,         Gilbert Mixon, DO

## 2021-09-14 ENCOUNTER — HOSPITAL ENCOUNTER (OUTPATIENT)
Age: 26
Discharge: HOME | End: 2021-09-14
Payer: COMMERCIAL

## 2021-09-14 DIAGNOSIS — Z13.1 ENCOUNTER FOR SCREENING FOR DIABETES MELLITUS: ICD-10-CM

## 2021-09-14 DIAGNOSIS — Z13.220 SCREENING CHOLESTEROL LEVEL: ICD-10-CM

## 2021-09-14 DIAGNOSIS — D50.8 OTHER IRON DEFICIENCY ANEMIA: ICD-10-CM

## 2021-09-14 DIAGNOSIS — E55.9 VITAMIN D INSUFFICIENCY: ICD-10-CM

## 2021-09-14 DIAGNOSIS — Z11.59 ENCOUNTER FOR HEPATITIS C SCREENING TEST FOR LOW RISK PATIENT: ICD-10-CM

## 2021-09-14 LAB
ABSOLUTE EOS #: 0.11 K/UL (ref 0–0.4)
ABSOLUTE IMMATURE GRANULOCYTE: 0 K/UL (ref 0–0.3)
ABSOLUTE LYMPH #: 3.18 K/UL (ref 1–4.8)
ABSOLUTE MONO #: 0.85 K/UL (ref 0.1–0.8)
BASOPHILS # BLD: 2 % (ref 0–2)
BASOPHILS ABSOLUTE: 0.21 K/UL (ref 0–0.2)
CHOLESTEROL, FASTING: 202 MG/DL
CHOLESTEROL/HDL RATIO: 2.9
DIFFERENTIAL TYPE: ABNORMAL
EOSINOPHILS RELATIVE PERCENT: 1 % (ref 1–4)
ESTIMATED AVERAGE GLUCOSE: 120 MG/DL
FERRITIN: 24 UG/L (ref 13–150)
HBA1C MFR BLD: 5.8 % (ref 4–6)
HCT VFR BLD CALC: 41.5 % (ref 36.3–47.1)
HDLC SERPL-MCNC: 69 MG/DL
HEMOGLOBIN: 12.9 G/DL (ref 11.9–15.1)
HEPATITIS C ANTIBODY: NONREACTIVE
IMMATURE GRANULOCYTES: 0 %
LDL CHOLESTEROL: 121 MG/DL (ref 0–130)
LYMPHOCYTES # BLD: 30 % (ref 24–44)
MCH RBC QN AUTO: 24.5 PG (ref 25.2–33.5)
MCHC RBC AUTO-ENTMCNC: 31.1 G/DL (ref 28.4–34.8)
MCV RBC AUTO: 78.7 FL (ref 82.6–102.9)
MONOCYTES # BLD: 8 % (ref 1–7)
MORPHOLOGY: ABNORMAL
MORPHOLOGY: ABNORMAL
NRBC AUTOMATED: 0 PER 100 WBC
PDW BLD-RTO: 21.3 % (ref 11.8–14.4)
PLATELET # BLD: 351 K/UL (ref 138–453)
PLATELET ESTIMATE: ABNORMAL
PMV BLD AUTO: 10 FL (ref 8.1–13.5)
RBC # BLD: 5.27 M/UL (ref 3.95–5.11)
RBC # BLD: ABNORMAL 10*6/UL
SEG NEUTROPHILS: 59 % (ref 36–66)
SEGMENTED NEUTROPHILS ABSOLUTE COUNT: 6.25 K/UL (ref 1.8–7.7)
TRIGLYCERIDE, FASTING: 61 MG/DL
VITAMIN D 25-HYDROXY: 35.6 NG/ML (ref 30–100)
VLDLC SERPL CALC-MCNC: ABNORMAL MG/DL (ref 1–30)
WBC # BLD: 10.6 K/UL (ref 3.5–11.3)
WBC # BLD: ABNORMAL 10*3/UL

## 2021-09-14 PROCEDURE — 83036 HEMOGLOBIN GLYCOSYLATED A1C: CPT

## 2021-09-14 PROCEDURE — 80061 LIPID PANEL: CPT

## 2021-09-14 PROCEDURE — 86803 HEPATITIS C AB TEST: CPT

## 2021-09-14 PROCEDURE — 85025 COMPLETE CBC W/AUTO DIFF WBC: CPT

## 2021-09-14 PROCEDURE — 82728 ASSAY OF FERRITIN: CPT

## 2021-09-14 PROCEDURE — 36415 COLL VENOUS BLD VENIPUNCTURE: CPT

## 2021-09-14 PROCEDURE — 82306 VITAMIN D 25 HYDROXY: CPT

## 2021-10-01 ENCOUNTER — POSTPARTUM VISIT (OUTPATIENT)
Dept: OBGYN CLINIC | Age: 26
End: 2021-10-01

## 2021-10-01 VITALS
BODY MASS INDEX: 26.33 KG/M2 | RESPIRATION RATE: 16 BRPM | WEIGHT: 139.38 LBS | DIASTOLIC BLOOD PRESSURE: 64 MMHG | SYSTOLIC BLOOD PRESSURE: 114 MMHG

## 2021-10-01 PROCEDURE — 0503F POSTPARTUM CARE VISIT: CPT | Performed by: OBSTETRICS & GYNECOLOGY

## 2021-10-01 RX ORDER — VENLAFAXINE HYDROCHLORIDE 37.5 MG/1
37.5 CAPSULE, EXTENDED RELEASE ORAL DAILY
Qty: 60 CAPSULE | Refills: 2
Start: 2021-10-01 | End: 2021-10-07

## 2021-10-01 NOTE — PROGRESS NOTES
Renetta Arevalo  10/1/2021  6:25 PM        Renetta Arevalo is a 23 y.o. female       The patient was seen. She has no chief complaints today. She delivered vaginally on 21. She is  breast feeding and there is not any signs or symptoms of mastitis. She has had to deal with many health issues with her son and now her grandmother is very ill and in ICU at [facility]. She is struggling with severe post partum depression. She is seeing therapist and taking her buspar. She denies any suicidal thoughts with a plan, intent to harm others and delusional ideas. Today her lochia is moderate she denies any dizziness or shortness of breath. She has been taking Slynd and continues to have steady bleeding with exacerbation with clots at times. Her pregnancy was complicated by:  Patient Active Problem List    Diagnosis Date Noted    Sinus tachycardia 08/10/2020     Priority: High     21 M Apg 8/9 Wt 4#13 2021    Intrahepatic cholestasis of pregnancy (G1) 2021    Essential hypertension 02/10/2021    COVID-19 (2020) 08/10/2020    Anxiety 08/10/2020         She does admit to having good home support. However her partner is working and gone a lot. OB History    Para Term  AB Living   1 1 0 1 0 1   SAB TAB Ectopic Molar Multiple Live Births   0 0 0 0 0 1      # Outcome Date GA Lbr Von/2nd Weight Sex Delivery Anes PTL Lv   1  21 36w1d / 00:44 4 lb 13.3 oz (2.19 kg) M Vag-Spont EPI Y SELAM      Name: Katherina Kayser: 8  Apgar5: 9       Patient Active Problem List   Diagnosis    COVID-19 (2020)    Anxiety    Sinus tachycardia    Essential hypertension    Intrahepatic cholestasis of pregnancy (G1)     21 M Apg 8/9 Wt 4#13       Blood pressure 114/64, resp. rate 16, weight 139 lb 6 oz (63.2 kg), currently breastfeeding.       Abdomen: Soft and non-tender; good bowel sounds; no guarding, rebound or rigidity; no CVA tenderness bilaterally. Extremities: No calf tenderness bilaterally. DTR 2/4 bilaterally. No edema. Perineum: pt declined    Assessment:   Diagnosis Orders   1.  21 M Apg 8/9 Wt 4#13  venlafaxine (EFFEXOR XR) 37.5 MG extended release capsule   2. Postpartum depression  venlafaxine (EFFEXOR XR) 37.5 MG extended release capsule     Chief Complaint   Patient presents with   [employer] Postpartum Care     Patient Active Problem List    Diagnosis Date Noted    Sinus tachycardia 08/10/2020     Priority: High     21 M Apg 8/9 Wt 4#13 2021    Intrahepatic cholestasis of pregnancy (G1) 2021    Essential hypertension 02/10/2021    COVID-19 (2020) 08/10/2020    Anxiety 08/10/2020       Plan:  1. Return to the office in 1 week  2. Signs & Symptoms of mastitis reviewed; notify if occurs  3. Secondary smoke risks reviewed. Increased risks of respiratory problems, Sudden     infant death syndrome, and potential malignancies. 4. Restrictions lifted  6. Return in about 1 week (around 10/8/2021) for follow up severe PP depression. 7. ER immediately for SI/HI  8. Add effexor daily  9. Follow with therapist  10. Change from [facility] to combined OCP for continued bleeding, if continues get TVUS    Patient was seen with total face to face time of 20 minutes. More than 50% of this visit was on counseling and education regarding her    Diagnosis Orders   1.  21 M Apg 8/9 Wt 4#13  venlafaxine (EFFEXOR XR) 37.5 MG extended release capsule   2. Postpartum depression  venlafaxine (EFFEXOR XR) 37.5 MG extended release capsule    and her options. She was also counseled on her preventative health maintenance recommendations and follow-up.

## 2021-10-07 ENCOUNTER — HOSPITAL ENCOUNTER (EMERGENCY)
Age: 26
Discharge: HOME | End: 2021-10-07
Attending: EMERGENCY MEDICINE
Payer: COMMERCIAL

## 2021-10-07 ENCOUNTER — APPOINTMENT (OUTPATIENT)
Dept: GENERAL RADIOLOGY | Age: 26
End: 2021-10-07
Payer: COMMERCIAL

## 2021-10-07 VITALS
WEIGHT: 139 LBS | DIASTOLIC BLOOD PRESSURE: 80 MMHG | HEIGHT: 60 IN | OXYGEN SATURATION: 98 % | RESPIRATION RATE: 16 BRPM | HEART RATE: 81 BPM | SYSTOLIC BLOOD PRESSURE: 139 MMHG | BODY MASS INDEX: 27.29 KG/M2 | TEMPERATURE: 98.8 F

## 2021-10-07 DIAGNOSIS — T50.905A MEDICATION SIDE EFFECT, INITIAL ENCOUNTER: Primary | ICD-10-CM

## 2021-10-07 LAB
ABSOLUTE EOS #: 0 K/UL (ref 0–0.4)
ABSOLUTE IMMATURE GRANULOCYTE: ABNORMAL K/UL (ref 0–0.3)
ABSOLUTE LYMPH #: 2.3 K/UL (ref 1–4.8)
ABSOLUTE MONO #: 0.73 K/UL (ref 0.1–1.3)
ANION GAP SERPL CALCULATED.3IONS-SCNC: 11 MMOL/L (ref 9–17)
BASOPHILS # BLD: 0 % (ref 0–2)
BASOPHILS ABSOLUTE: 0 K/UL (ref 0–0.2)
BUN BLDV-MCNC: 8 MG/DL (ref 6–20)
BUN/CREAT BLD: NORMAL (ref 9–20)
CALCIUM SERPL-MCNC: 9.5 MG/DL (ref 8.6–10.4)
CHLORIDE BLD-SCNC: 101 MMOL/L (ref 98–107)
CO2: 25 MMOL/L (ref 20–31)
CREAT SERPL-MCNC: 0.68 MG/DL (ref 0.5–0.9)
DIFFERENTIAL TYPE: ABNORMAL
EOSINOPHILS RELATIVE PERCENT: 0 % (ref 0–4)
GFR AFRICAN AMERICAN: >60 ML/MIN
GFR NON-AFRICAN AMERICAN: >60 ML/MIN
GFR SERPL CREATININE-BSD FRML MDRD: NORMAL ML/MIN/{1.73_M2}
GFR SERPL CREATININE-BSD FRML MDRD: NORMAL ML/MIN/{1.73_M2}
GLUCOSE BLD-MCNC: 87 MG/DL (ref 70–99)
HCT VFR BLD CALC: 36 % (ref 36–46)
HEMOGLOBIN: 12.5 G/DL (ref 12–16)
IMMATURE GRANULOCYTES: ABNORMAL %
LYMPHOCYTES # BLD: 19 % (ref 24–44)
MCH RBC QN AUTO: 26.8 PG (ref 26–34)
MCHC RBC AUTO-ENTMCNC: 34.7 G/DL (ref 31–37)
MCV RBC AUTO: 77.3 FL (ref 80–100)
MONOCYTES # BLD: 6 % (ref 1–7)
MORPHOLOGY: ABNORMAL
NRBC AUTOMATED: ABNORMAL PER 100 WBC
PDW BLD-RTO: 23.3 % (ref 11.5–14.9)
PLATELET # BLD: 351 K/UL (ref 150–450)
PLATELET ESTIMATE: ABNORMAL
PMV BLD AUTO: 7.4 FL (ref 6–12)
POTASSIUM SERPL-SCNC: 3.8 MMOL/L (ref 3.7–5.3)
RBC # BLD: 4.66 M/UL (ref 4–5.2)
RBC # BLD: ABNORMAL 10*6/UL
SEG NEUTROPHILS: 75 % (ref 36–66)
SEGMENTED NEUTROPHILS ABSOLUTE COUNT: 9.07 K/UL (ref 1.3–9.1)
SODIUM BLD-SCNC: 137 MMOL/L (ref 135–144)
TROPONIN INTERP: NORMAL
TROPONIN T: NORMAL NG/ML
TROPONIN, HIGH SENSITIVITY: <6 NG/L (ref 0–14)
TSH SERPL DL<=0.05 MIU/L-ACNC: 0.69 MIU/L (ref 0.3–5)
WBC # BLD: 12.1 K/UL (ref 3.5–11)
WBC # BLD: ABNORMAL 10*3/UL

## 2021-10-07 PROCEDURE — 84484 ASSAY OF TROPONIN QUANT: CPT

## 2021-10-07 PROCEDURE — 93005 ELECTROCARDIOGRAM TRACING: CPT | Performed by: STUDENT IN AN ORGANIZED HEALTH CARE EDUCATION/TRAINING PROGRAM

## 2021-10-07 PROCEDURE — 99284 EMERGENCY DEPT VISIT MOD MDM: CPT

## 2021-10-07 PROCEDURE — 80048 BASIC METABOLIC PNL TOTAL CA: CPT

## 2021-10-07 PROCEDURE — 85025 COMPLETE CBC W/AUTO DIFF WBC: CPT

## 2021-10-07 PROCEDURE — 71045 X-RAY EXAM CHEST 1 VIEW: CPT

## 2021-10-07 PROCEDURE — 36415 COLL VENOUS BLD VENIPUNCTURE: CPT

## 2021-10-07 PROCEDURE — 84443 ASSAY THYROID STIM HORMONE: CPT

## 2021-10-07 ASSESSMENT — ENCOUNTER SYMPTOMS
COUGH: 0
ABDOMINAL PAIN: 0
RHINORRHEA: 0
DIARRHEA: 0
NAUSEA: 0
VOMITING: 0
BACK PAIN: 0
SORE THROAT: 0
SHORTNESS OF BREATH: 0
CONSTIPATION: 0

## 2021-10-07 NOTE — ED TRIAGE NOTES
Follow-up with Palliative and Supportive Care  Madai Travis 69 y o  male [id number]    ASSESSMENT & PLAN:  1  Centrilobular emphysema ([facility] )    2  Chronic respiratory failure with hypoxia (HCC)    3  Other chronic pulmonary embolism without acute cor pulmonale ([facility] )    4  Pulmonary hypertension ([facility] )    5  AAT (alpha-1-antitrypsin) deficiency ([facility] )    6  Gastroesophageal reflux disease, unspecified whether esophagitis present    7  Chronic venous insufficiency    8  Unsteady gait    9  Insomnia, unspecified type    10  Dysphoric mood    11  Weight loss, unintentional    12  Loss of appetite    13  Palliative care patient           Continue lorazepam for anxiousness   Continue Tylenol, Voltaren gel for musculoskeletal pain   Continue cholestyramine for diarrhea; recommended Imodium PRN   Respiratory status stable  Using 3LPM continuously   Continue walker, cane for unsteady gait   Patient had been taking as much as 200mg trazodone at bedtime for sleep (prescribed at 100mg QHS)  Will increase to 150mg QHS  Continue olanzapine which also helps w/ sleep   Accelerated weight loss noted, 13lb in 6 weeks; patient endorses low appetite  o Continue olanzapine  o Recommended increasing from 1 Ensure per day to 2-3   o Patient does not want Meals on Wheels and is concerned about cost of Mom's Meals; he primarily eats frozen dinners (daughter Kostas Barajas does his grocery shopping)  Counseled  o Patient not interested in [employer]   Continue w/ home PT + OT   Emotional support provided   Patient declines advanced care planning and does not want to address this moving forward  He states his daughter Kostas Barajas is his preferred decision-maker and knows his wishes  He wishes to continue disease-directed care w/o limit   This provider reviewed DC summaries from 2/24/21 and 3/3/21, PT+OT from 3/3/21   Cr 1 04 on 3/3/21, Hb 13 2 on 3/3/21, Albumin 3 6 on 2/22/21     Medication safety issues addressed - no driving under the Mode of arrival (squad #, walk in, police, etc) : Walk in        Chief complaint(s): Palpitations, loss of appetite        Arrival Note (brief scenario, treatment PTA, etc). : Pt arrives to ED c/o palpitations and loss of appetite. Patient states that she was recently started on Effexor by her OB/GYN and thinks that she may be having a reaction to the medication. Patient also has numbness and tingling in her feet. C= \"Have you ever felt that you should Cut down on your drinking? \"  No  A= \"Have people Annoyed you by criticizing your drinking? \"  No  G= \"Have you ever felt bad or Guilty about your drinking? \"  No  E= \"Have you ever had a drink as an Eye-opener first thing in the morning to steady your nerves or to help a hangover? \"  No      Deferred []      Reason for deferring: N/A    *If yes to two or more: probable alcohol abuse. * influence of narcotics, watch for adverse effects including AMS and respiratory depression, keep medications stored in a safe/locked environment  Requested Prescriptions     Signed Prescriptions Disp Refills    OLANZapine (ZyPREXA) 7 5 mg tablet 30 tablet 2     Sig: Take 1 tablet (7 5 mg total) by mouth daily at bedtime    traZODone (DESYREL) 150 mg tablet 30 tablet 2     Sig: Take 1 tablet (150 mg total) by mouth daily at bedtime       Medications Discontinued During This Encounter   Medication Reason    traZODone (DESYREL) 100 mg tablet Reorder    OLANZapine (ZyPREXA) 5 mg tablet Reorder       Representatives have queried the patient's controlled substance dispensing history in the Prescription Drug Monitoring Program in compliance with regulations before I have prescribed any controlled substances  The prescription history is consistent with prescribed therapy and our practice policies  40 minutes were spent face to face with patient with greater than 50% of the time spent in counseling or coordination of care including discussions of symptom assessment and management, medication review and adjustment, psychosocial support, chart review, imaging review, lab review, advanced directives and medical marijuana  All of the patient's questions were answered during this discussion  Return in about 8 weeks (around 6/1/2021)  SUBJECTIVE:  Chief Complaint   Patient presents with    Follow-up    COPD    Shortness of Breath    Gait Problem    Weight Loss    Loss of Appetite    Diarrhea    Insomnia    Counseling        HPI    Toñokali Strickland is a 69 y o  male w/ severe emphysema w/ alpha-1-antitrypsin deficiency, chronic hypoxic respiratory failure on 3LPM O2, pulmonary hypertension w/ chronic PE, former tobacco dependence (60 pack-years, quit 2017), OA, BPH, IBS, idiopathic neuropathy, h/o BCC of the face and neck, anxiousness, insomnia   He follows w/ [facility] / Dr Radha Holly (Pulmonology), Dr Gracy Caputo (Cardiology)  Patient is known to [facility] clinic; last seen by me 12/8/20 for symptom management, psychosocial support  Visit today for same  Patient admitted to [facility] 2/22-24/21 and 3/2-3/21 for generalized weakness  Patient is frustrated that his functional status has not been improving despite short-term rehab and ongoing home PT and OT  He has completed his COVIS19 vaccine series and wants to be strong enough to go out and play billiards with his friends  Patient has had a 6 6% TBW loss in 6 weeks (198lb 2/22/21, 185lb today)  He did not like the food in [facility] (he returned home about a week and a half ago) but has ongoing decreased appetite  He is supplementing PO intake w/ 1 Ensure per day  He primarily eats frozen processed meals  He eats 3 meals per day but will count a peanut butter & jelly sandwich as a complete meal  His daughter Daniela Arroyo does his grocery shopping but does not provide meals  He is not interested in Meals On Wheels as he has heard they have a limited selection, primarily foods he does not like  He is concerned about the cost of other meal delivery services  Patients mood is stable  He is taking lorazepam BID ATC  His sleep improves w/ olanzapine, trazodone, occasional doxylamine  He is prescribed 100mg trazodone QHS but had taken 200mg QHS in the hospital and in [facility]  He felt 200mg was more helpful than 100mg  Patient denies N/V  He reports having diarrhea that improves somewhat w/ Questran  Patient reports his respiratory status is stable on 3LPM O2 delivered via [place]     PDMP shows no concerns  The following portions of the medical history were reviewed: past medical history, problem list, medication list, and social history      Current Outpatient Medications:     acetaminophen (TYLENOL) 500 mg tablet, Take 2 tablets (1,000 mg total) by mouth every 8 (eight) hours (Patient not taking: Reported on 3/3/2021), Disp: 90 tablet, Rfl: 3    budesonide (PULMICORT) 0 5 mg/2 mL nebulizer solution, Take 1 vial (0 5 mg total) by nebulization 2 (two) times a day Rinse mouth after use , Disp: 60 vial, Rfl: 11    busPIRone (BUSPAR) 10 mg tablet, Take 1 tablet (10 mg total) by mouth 3 (three) times a day, Disp: 90 tablet, Rfl: 2    cholestyramine sugar free (QUESTRAN LIGHT) 4 g packet, Take 1 packet (4 g total) by mouth 2 (two) times a day, Disp: 60 packet, Rfl: 1    Diclofenac Sodium (VOLTAREN) 1 %, Apply 2 g topically 4 (four) times a day Apply to R knee, Disp: 50 g, Rfl: 0    doxylamine (Unisom SleepTabs) 25 MG tablet, Take 25 mg by mouth daily at bedtime as needed for sleep, Disp: , Rfl:     famotidine (PEPCID) 20 mg tablet, Take 1 tablet (20 mg total) by mouth daily (Patient not taking: Reported on 3/3/2021), Disp: 30 tablet, Rfl: 3    finasteride (PROSCAR) 5 mg tablet, Take 1 tablet (5 mg total) by mouth every morning (Patient not taking: Reported on 3/3/2021), Disp: 90 tablet, Rfl: 3    fluticasone (FLONASE) 50 mcg/act nasal spray, 2 sprays into each nostril daily, Disp: 16 g, Rfl: 5    formoterol (Perforomist) 20 MCG/2ML nebulizer solution, Take 1 vial (20 mcg total) by nebulization 2 (two) times a day, Disp: 60 vial, Rfl: 11    gabapentin (NEURONTIN) 300 mg capsule, Take 1 capsule (300 mg total) by mouth 3 (three) times a day, Disp: 90 capsule, Rfl: 6    guaiFENesin (ROBITUSSIN) 100 MG/5ML oral liquid, Take 200 mg by mouth 2 (two) times a day, Disp: , Rfl:     ipratropium-albuterol (DUO-NEB) 0 5-2 5 mg/3 mL nebulizer solution, take 1 vial (3ml) by nebulization four times a day as needed , Disp: 360 mL, Rfl: 0    LORazepam (ATIVAN) 1 mg tablet, TAKE ONE TABLET BY MOUTH TWICE DAILY , Disp: 60 tablet, Rfl: 3    midodrine (PROAMATINE) 2 5 mg tablet, Take 1 tablet (2 5 mg total) by mouth 3 (three) times a day, Disp: 90 tablet, Rfl: 11    OLANZapine (ZyPREXA) 7 5 mg tablet, Take 1 tablet (7 5 mg total) by mouth daily at bedtime, Disp: 30 tablet, Rfl: 2    OXYGEN-HELIUM IN, Inhale 2L at rest- 3L with activity, Disp: , Rfl:     pantoprazole (PROTONIX) 40 mg tablet, Take 1 tablet (40 mg total) by mouth 2 (two) times a day, Disp: 180 tablet, Rfl: 3    tamsulosin (FLOMAX) 0 4 mg, Half a tablet daily (Patient not taking: Reported on 3/3/2021), Disp: 30 capsule, Rfl: 5    tamsulosin (FLOMAX) 0 4 mg, Take 0 4 mg by mouth daily, Disp: , Rfl:     traZODone (DESYREL) 150 mg tablet, Take 1 tablet (150 mg total) by mouth daily at bedtime, Disp: 30 tablet, Rfl: 2    Ventolin  (90 Base) MCG/ACT inhaler, Inhale 1 puff every 6 (six) hours as needed for shortness of breath, Disp: 1 Inhaler, Rfl: 11    ZEMAIRA infusion, once a week , Disp: , Rfl:     Review of Systems   Constitutional: Positive for activity change, appetite change, fatigue and unexpected weight change  Negative for chills and fever  Eyes: Negative for pain and redness  Respiratory: Positive for shortness of breath (at rest, worse w/ exertion)  Gastrointestinal: Positive for diarrhea  Negative for abdominal pain, constipation, nausea and vomiting  Endocrine: Negative for polydipsia and polyphagia  Musculoskeletal: Positive for arthralgias and gait problem  Skin: Positive for pallor  Allergic/Immunologic: Positive for immunocompromised state  Neurological: Positive for weakness  Negative for facial asymmetry  Psychiatric/Behavioral: Positive for dysphoric mood (stable) and sleep disturbance  The patient is nervous/anxious (stable)  OBJECTIVE:  /80   Pulse 72   Temp (!) 96 8 °F (36 °C) (Tympanic)   Resp 18   Wt 83 9 kg (185 lb)   BMI 21 94 kg/m²   Vital signs reviewed  Physical Exam:  Constitutional: Thin, debilitated, frail, chronically ill-appearing elderly male  Ambulating w/ cane  In no acute physical or emotional distress  Head: Normocephalic and atraumatic  Eyes: EOM are normal  No ocular discharge  No scleral icterus  Neck: No visible adenopathy or masses    Respiratory: Effort normal on 3LPM O2 delivered via NC  Able to speak comfortably in complete sentences  No stridor  No respiratory distress  Gastrointestinal: No abdominal distension  Musculoskeletal: No edema  Neurological: Alert, oriented and appropriately conversant  No focal deficits  Follows commands appropriately  Skin: No diaphoresis, no rashes seen on exposed areas of skin  Psychiatric: Displays a normal mood and affect  Behavior, judgment and thought content appear normal      Erika Connell MD  [facility] Palliative and Supportive Care      Portions of this document may have been created using dictation software and as such some "sound alike" terms may have been generated by the system  Do not hesitate to contact me with any questions or clarifications

## 2021-10-07 NOTE — Clinical Note
Marylou Bennett was seen and treated in our emergency department on 10/7/2021. She may return to work on 10/11/2021. If you have any questions or concerns, please don't hesitate to call.       Carolynne Dubin, DO

## 2021-10-07 NOTE — ED PROVIDER NOTES
[facility] ED  Emergency Department Encounter  Emergency Medicine Resident     Pt Name: Monroe Perez  MRN: [id number]  [place] 1995  Date of evaluation: 10/7/21  PCP:  Yinka Jackson PA-C    CHIEF COMPLAINT       Chief Complaint   Patient presents with    Palpitations    Anorexia       [facility]ORY [facility]  (Location/Symptom, Timing/Onset, Context/Setting, Quality, Duration, Modifying Zelpha Hoots.)      Monroe Perez is a 23 y.o. female with past medical history of Covid status post spontaneous vaginal delivery 7/31/2021 who has been following with her OB/GYN for postpartum depression who presents with numbness and tingling in her hands and feet, palpitations and decreased appetite for the past few days after being started on Effexor 1 week ago. Patient is concerned that the symptoms are secondary to this medication. Patient was also started on Reglan to help increase her milk production but stopped this medication 2 days ago. Patient states she did contact her OB and had a virtual visit with them yesterday. She states she was told to stop taking her Effexor so she has not taken it today. Patient is also taking BuSpar prescribed by her primary care physician which she has been on for some time. Patient denies fever, chills, rhinorrhea, congestion, chest pain, shortness of breath, nausea, vomiting, diarrhea or urinary symptoms. She does report decreased appetite and states that she has been having to force feed herself, however she will feel like vomiting after she does this. Patient denies suicidal ideation, homicidal ideation or hallucinations. She reports recent family stressors with the death of one of her grandparents and the other grandparent being recently ill.   Patient's child has also been in and out of the hospital.    [facility],[address] / SURGICAL / SOCIAL / FAMILY HISTORY      has a past medical history of COVID-19 (7/22/2020), Dysmenorrhea, Intrahepatic cholestasis of pregnancy (G1), and Menorrhagia. has a past surgical history that includes intrauterine device insertion (5/5/16 ); Endometrial biopsy (5/5/16); and Intrauterine Device Removal (03/14/2017). Social:  reports that she has never smoked. She has never used smokeless tobacco. She reports previous alcohol use. She reports that she does not use drugs. Family Hx:   Family History   Problem Relation Age of Onset    High Blood Pressure Paternal Grandmother    Morris Heart Disease Paternal Grandmother     Alcohol Abuse Mother     Substance Abuse Mother     Hypertension Father     Diabetes Father         borderline    Cancer Paternal Aunt 61        Brain Ca    Arthritis Maternal Grandmother     Osteoporosis Maternal Grandmother     Osteoarthritis Maternal Grandmother     Heart Disease Paternal Grandfather     High Blood Pressure Paternal Grandfather         Allergies:  Amoxil [amoxicillin]    Home Medications:  Prior to Admission medications    Medication Sig Start Date End Date Taking? Authorizing Provider   venlafaxine (EFFEXOR XR) 37.5 MG extended release capsule Take 1 capsule by mouth daily 10/1/21 10/7/21  Kaden Guzman,    metoclopramide (REGLAN) 10 MG tablet Take 1 tablet by mouth 3 times daily (with meals) 9/13/21 10/7/21  Raven Guzman DO   busPIRone (BUSPAR) 5 MG tablet Take 1 tablet by mouth 2 times daily 8/17/21   Claudia Olivarez PA-C   Blood Pressure Monitoring ( WRIST CUFF BP MONITOR) MISC Please take BP before taking medications or when feeling lightheaded/dizzy. 2/11/21   Johnnie Ball DO   Prenatal Vit-Fe Fumarate-FA (PRENATAL VITAMIN PO) Take by mouth    Historical Provider, MD   Fexofenadine HCl (ALLEGRA ALLERGY PO) Take by mouth     Historical Provider, MD       REVIEW OFSYSTEMS    (2-9 systems for level 4, 10 or more for level 5)      Review of Systems   Constitutional: Positive for activity change and appetite change. Negative for chills and fever.    HENT: Negative for congestion, ear discharge, rhinorrhea and sore throat. Respiratory: Negative for cough and shortness of breath. Cardiovascular: Positive for palpitations. Negative for chest pain and leg swelling. Gastrointestinal: Negative for abdominal pain, constipation, diarrhea, nausea and vomiting. Genitourinary: Negative for dysuria, frequency and hematuria. Musculoskeletal: Negative for arthralgias, back pain and neck pain. Skin: Negative for rash. Neurological: Positive for light-headedness and numbness. Negative for dizziness and weakness. Psychiatric/Behavioral: Negative for confusion, self-injury and suicidal ideas. All other systems reviewed and are negative. PHYSICAL EXAM   (up to 7 for level 4, 8 or more forlevel 5)      INITIAL VITALS:   Vitals:    10/07/21 1811   BP: 139/80   Pulse: 81   Resp: 16   Temp: 98.8 °F (37.1 °C)   TempSrc: Oral   SpO2: 98%   Weight: 139 lb (63 kg)   Height: 5' (1.524 m)        Physical Exam  Vitals and nursing note reviewed. Constitutional:       General: She is not in acute distress. Appearance: She is not toxic-appearing. Comments: Adult female sitting in stretcher no acute distress. Speaks in full sentences and answers questions appropriately. She is tearful. HENT:      Head: Normocephalic and atraumatic. Right Ear: Tympanic membrane, ear canal and external ear normal.      Left Ear: Tympanic membrane, ear canal and external ear normal.      Nose: Nose normal. No congestion or rhinorrhea. Mouth/Throat:      Mouth: Mucous membranes are moist.      Pharynx: Oropharynx is clear. No oropharyngeal exudate or posterior oropharyngeal erythema. Eyes:      Conjunctiva/sclera: Conjunctivae normal.      Pupils: Pupils are equal, round, and reactive to light. Cardiovascular:      Rate and Rhythm: Normal rate and regular rhythm. Pulses: Normal pulses. Heart sounds: No murmur heard. No friction rub. No gallop.     Pulmonary: Effort: Pulmonary effort is normal. No respiratory distress. Breath sounds: Normal breath sounds. No stridor. No wheezing, rhonchi or rales. Abdominal:      General: Abdomen is flat. There is no distension. Palpations: Abdomen is soft. There is no mass. Tenderness: There is no abdominal tenderness. Musculoskeletal:      Cervical back: Neck supple. No rigidity. Right lower leg: No edema. Left lower leg: No edema. Skin:     General: Skin is warm and dry. Capillary Refill: Capillary refill takes less than 2 seconds. Findings: No rash. Neurological:      Mental Status: She is alert. Comments: Alert and oriented x3, answers questions appropriately, speech clear  CN 2-12 intact, no facial droop  Moves all extremities spontaneously  5/5 strength flexion and extension upper extremities bilaterally.  strength 5/5 and equal  5/5 strength hip flexion, knee extension, dorsi and plantar flexion bilaterally  Sensation intact to light touch extremities x4   Psychiatric:         Mood and Affect: Mood is depressed. Affect is tearful. DIFFERENTIAL  DIAGNOSIS     DDX: Medication side effect, serotonin syndrome, postpartum depression, stress reaction, anxiety, panic attack, electrolyte abnormality, dehydration, thyroid disorder    Initial MDM/Plan: 23 y.o. female with past medical history of Covid status post spontaneous vaginal delivery 7/31/2021 who has been following with her OB/GYN for postpartum depression who presents with numbness and tingling in her hands and feet, palpitations and decreased appetite for the past few days after being started on Effexor 1 week ago. Patient was also recently started on Reglan and takes BuSpar chronically. Patient's physical exam, including comprehensive neurological exam was benign. She does appear depressed and tearful. Suspect medication side effect, especially with the compounding serotonergic effects of BuSpar and Effexor. Patient's hyperesthesias also could be due to the Reglan. However, patient could have an electrolyte abnormality or thyroid disorder. Would also like to check troponin and EKG due to her report of palpitations. Will obtain labs and discuss with her OB/GYN recommendations for medications moving forward. Patient has a therapist whom she sees and was recently referred to psychiatry by her OB/GYN. She declines any additional needs from social work today.     DIAGNOSTIC RESULTS / EMERGENCYDEPARTMENT COURSE / MDM     LABS:  Results for orders placed or performed during the hospital encounter of 10/07/21   CBC Auto Differential   Result Value Ref Range    WBC 12.1 (H) 3.5 - 11.0 k/uL    RBC 4.66 4.0 - 5.2 m/uL    Hemoglobin 12.5 12.0 - 16.0 g/dL    Hematocrit 36.0 36 - 46 %    MCV 77.3 (L) 80 - 100 fL    MCH 26.8 26 - 34 pg    MCHC 34.7 31 - 37 g/dL    RDW 23.3 (H) 11.5 - 14.9 %    Platelets 363 705 - 441 k/uL    MPV 7.4 6.0 - 12.0 fL    NRBC Automated NOT REPORTED per 100 WBC    Differential Type NOT REPORTED     Immature Granulocytes NOT REPORTED 0 %    Absolute Immature Granulocyte NOT REPORTED 0.00 - 0.30 k/uL    WBC Morphology NOT REPORTED     RBC Morphology NOT REPORTED     Platelet Estimate NOT REPORTED     Seg Neutrophils 75 (H) 36 - 66 %    Lymphocytes 19 (L) 24 - 44 %    Monocytes 6 1 - 7 %    Eosinophils % 0 0 - 4 %    Basophils 0 0 - 2 %    Segs Absolute 9.07 1.3 - 9.1 k/uL    Absolute Lymph # 2.30 1.0 - 4.8 k/uL    Absolute Mono # 0.73 0.1 - 1.3 k/uL    Absolute Eos # 0.00 0.0 - 0.4 k/uL    Basophils Absolute 0.00 0.0 - 0.2 k/uL    Morphology MICROCYTOSIS PRESENT     Morphology ANISOCYTOSIS PRESENT     Morphology 1+ POLYCHROMASIA    Basic Metabolic Panel w/ Reflex to MG   Result Value Ref Range    Glucose 87 70 - 99 mg/dL    BUN 8 6 - 20 mg/dL    CREATININE 0.68 0.50 - 0.90 mg/dL    Bun/Cre Ratio NOT REPORTED 9 - 20    Calcium 9.5 8.6 - 10.4 mg/dL    Sodium 137 135 - 144 mmol/L    Potassium 3.8 3.7 - 5.3 mmol/L    Chloride 101 98 - 107 mmol/L    CO2 25 20 - 31 mmol/L    Anion Gap 11 9 - 17 mmol/L    GFR Non-African American >60 >60 mL/min    GFR African American >60 >60 mL/min    GFR Comment          GFR Staging NOT REPORTED    Troponin   Result Value Ref Range    Troponin, High Sensitivity <6 0 - 14 ng/L    Troponin T NOT REPORTED <0.03 ng/mL    Troponin Interp NOT REPORTED    TSH w/reflex to FT4   Result Value Ref Range    TSH 0.69 0.30 - 5.00 mIU/L   EKG 12 Lead   Result Value Ref Range    Ventricular Rate 74 BPM    Atrial Rate 74 BPM    P-R Interval 138 ms    QRS Duration 80 ms    Q-T Interval 386 ms    QTc Calculation (Bazett) 428 ms    P Axis 50 degrees    R Axis 27 degrees    T Axis 46 degrees         RADIOLOGY:  XR CHEST PORTABLE    Result Date: 10/7/2021  EXAMINATION: ONE XRAY VIEW OF THE CHEST 10/7/2021 2:00 pm COMPARISON: 24 September 2020 HISTORY: ORDERING SYSTEM PROVIDED HISTORY: palpitations TECHNOLOGIST PROVIDED HISTORY: palpitations Reason for Exam: palpitations Acuity: Unknown Type of Exam: Unknown Additional signs and symptoms: palpitations FINDINGS: Adequate inspiration is present. Questionable opacity is present within the right lung base, versus artifact secondary to the overlying soft tissues. No pneumothorax noted. Heart size and mediastinal contours appear normal. Osseous structures are stable. 1. Questionable opacity versus artifact, right lung base, related to the overlying soft tissues. 2. No evidence of a pneumothorax       EKG  Normal sinus rhythm with sinus arrhythmia, rate: 74  ME: 138  QRS: 80  QT/QTc: 386/428  No ST elevation or depression  T wave inversion in V2    All EKG's are interpreted by the Emergency Department Physician who either signs or Co-signs this chart in the absence of a cardiologist.      MEDICATIONS ORDERED:  No orders of the defined types were placed in this encounter.       PROCEDURES:  None      CONSULTS:  IP CONSULT TO OB GYN      EMERGENCY DEPARTMENT COURSE:  ED Course as of Oct 08 0022   Thu Oct 07, 2021   2012 Discussed the patient with OB/GYN who recommends the patient stop the Effexor. Recommend she continue the BuSpar until she can follow-up with her primary care physician, given that the patient has been on this medication for several years. Also agrees with stopping the Reglan. [KA]     2030 WBC(!): 12.1 [KA]     2031 Troponin, High Sensitivity: <6 [KA]     2031 2045 TSH: 0.69 [KA]    Discussed with the patient her reassuring work-up including negative labs, EKG and chest x-ray. Discussed that her symptoms are likely due to the compounding factor of BuSpar and Effexor. Recommended that she discontinue the Effexor and the Reglan and call her primary care physician tomorrow to schedule appointment as soon as possible for recheck. Patient may benefit from discontinuing the BuSpar as well and starting on a more effective antidepressant. This can be done by her primary care physician or psychiatry when she sees them. Patient was recommended to continue her therapy appointments. She was to return to the emergency department for any worsening of her symptoms. [KA]      ED Course User Index  [KA] Donya Glaser DO          FINAL IMPRESSION      1.  Medication side effect, initial encounter          DISPOSITION / PLAN     DISPOSITION Decision To Discharge 10/07/2021 08:43:27 PM      PATIENT REFERRED TO:  Barry Ramirez PA-C  [address] [place] [id number]  [phone]    Schedule an appointment as soon as possible for a visit       [facility] ED  [address]  [phone]    If symptoms worsen      DISCHARGE MEDICATIONS:  Discharge Medication List as of 10/7/2021  8:46 PM          Donya Glaser DO  Emergency Medicine Resident    (Please note that portions of this note were completed with a voice recognition program.Efforts were made to edit the dictations but occasionally words are mis-transcribed.)        Savanah Sheriff DO  Resident  10/08/21 Arnaldo Agarwal

## 2021-10-07 NOTE — ED NOTES
Patient verbalizes increased depression, feeling cold and \"jittery\" to extremities, with sensation of something crawling on her bilateral hands and arms. Patient has felt this recently and feels this is due to starting Effexor a week ago. Patient is also taking Buspar to manage post partum depressive symptoms and Risperidone to help increase milk productions for breast feeding that has decreased since starting Buspar. Patient also verbalizes that she has a referral as of today for a Psychiatrist from her OB/GYN whom is the prescribe for her depressive medication. 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 Patient is tearful, but engages with Shola Faria, talking freely about her  and  son. Patient denies suicidal or homicidal ideation. Patient states that she recently lost her grandfather and her grandmother was hospitalized with a back fracture and newly diagnosed cancer. Patient also states that since her son was born, she is still having vaginal bleeding. Patient's provider has changed patient's birth control to help reduce the bleeding with patient having blood work checked to rule out anemia. ED provider is at bedside to speak with patient.       Nini Vargas RN  10/07/21 [id number]

## 2021-10-08 ENCOUNTER — CLINICAL DOCUMENTATION (OUTPATIENT)
Dept: PSYCHOLOGY | Age: 26
End: 2021-10-08

## 2021-10-08 DIAGNOSIS — F32.A DEPRESSION, UNSPECIFIED DEPRESSION TYPE: Primary | ICD-10-CM

## 2021-10-08 NOTE — ED PROVIDER NOTES
[address]     Pt Name: Lelo Buchanan  MRN: [id number]  [place] 1995  Date of evaluation: 10/7/21       Lelo Buchanan is a 23 y.o. female who presents with Palpitations and Anorexia      MDM:       Vitals:   Vitals:    10/07/21 1811   BP: 139/80   Pulse: 81   Resp: 16   Temp: 98.8 °F (37.1 °C)   TempSrc: Oral   SpO2: 98%   Weight: 139 lb (63 kg)   Height: 5' (1.524 m)         I personally evaluated and examined the patient in conjunction with the resident and agree with the assessment, treatment plan, and disposition of the patient as recorded by the resident. I performed a history and physical examination of the patient and discussed management with the resident. I reviewed the residents note and agree with the documented findings and plan of care. Any areas of disagreement are noted on the chart. I was personally present for the key portions of any procedures. I have documented in the chart those procedures where I was not present during the key portions. I have personally reviewed all images and agree with the resident's interpretation. I have reviewed the emergency nurses triage note. I agree with the chief complaint, past medical history, past surgical history, allergies, medications, social and family history as documented unless otherwise noted.     Heena Mas MD  Attending Emergency Physician           Heena Mas MD  10/07/21 2019

## 2021-10-08 NOTE — PROGRESS NOTES
MRN sent to Angelo Pearce to be scheduled for psychiatry evaluation. Patient was in ER today and seen at [address] [facility] due to medication issues. Patient is currently being seen by external outpatient counselor so she is not being scheduled with [facility]. Encouraged patient to call the office if she has any further concerns prior to next follow-up visit.

## 2021-10-09 LAB
EKG ATRIAL RATE: 74 BPM
EKG P AXIS: 50 DEGREES
EKG P-R INTERVAL: 138 MS
EKG Q-T INTERVAL: 386 MS
EKG QRS DURATION: 80 MS
EKG QTC CALCULATION (BAZETT): 428 MS
EKG R AXIS: 27 DEGREES
EKG T AXIS: 46 DEGREES
EKG VENTRICULAR RATE: 74 BPM

## 2021-10-09 PROCEDURE — 93010 ELECTROCARDIOGRAM REPORT: CPT | Performed by: INTERNAL MEDICINE

## 2021-10-13 ENCOUNTER — TELEMEDICINE (OUTPATIENT)
Dept: OBGYN CLINIC | Age: 26
End: 2021-10-13
Payer: COMMERCIAL

## 2021-10-13 PROCEDURE — 99213 OFFICE O/P EST LOW 20 MIN: CPT | Performed by: OBSTETRICS & GYNECOLOGY

## 2021-10-18 ENCOUNTER — TELEMEDICINE (OUTPATIENT)
Dept: PSYCHIATRY | Age: 26
End: 2021-10-18
Payer: COMMERCIAL

## 2021-10-18 DIAGNOSIS — F32.A DEPRESSION, UNSPECIFIED DEPRESSION TYPE: ICD-10-CM

## 2021-10-18 DIAGNOSIS — F33.9 MAJOR DEPRESSIVE DISORDER, RECURRENT EPISODE WITH ANXIOUS DISTRESS (HCC): Primary | ICD-10-CM

## 2021-10-18 DIAGNOSIS — Z91.49 PSYCHOLOGICAL TRAUMA HISTORY: ICD-10-CM

## 2021-10-18 PROCEDURE — 90792 PSYCH DIAG EVAL W/MED SRVCS: CPT | Performed by: NURSE PRACTITIONER

## 2021-10-18 NOTE — PROGRESS NOTES
Monitoring (SM WRIST CUFF BP MONITOR) MISC, Please take BP before taking medications or when feeling lightheaded/dizzy. , Disp: 1 each, Rfl: 0    Prenatal Vit-Fe Fumarate-FA (PRENATAL VITAMIN PO), Take by mouth, Disp: , Rfl:      PDMP Monitoring:    Last PDMP Tw[place] as Reviewed [facility]):  Review User Review Instant Review Result          Last Controlled Substance Monitoring Documentation      ED from 2/11/2021 in [facility] ED   Comments  pt alert and oriented x4, respirations even non-labored. pt ambulatory with steady gait. filed at 02/11/2021 1242        Urine Drug Screenings (1 yr)     DRUG SCREEN MULTI URINE  Collected: 7/30/2021 11:03 AM (Final result)    Complete Results          Urine Drug Screen  Collected: 1/14/2021  9:03 PM (Final result)    Complete Results              Medication Contract and Consent for Opioid Use Documents Filed      No documents found                 OARRS checked and there were no concerns of substance abuse, or prescription misuse.          Social History     Socioeconomic History    Marital status: Single     Spouse name: Not on file    Number of children: Not on file    Years of education: Not on file    Highest education level: Not on file   Occupational History    Not on file   Tobacco Use    Smoking status: Never Smoker    Smokeless tobacco: Never Used   Vaping Use    Vaping Use: Never used   Substance and Sexual Activity    Alcohol use: Not Currently     Alcohol/week: 0.0 standard drinks     Comment: once a month    Drug use: No    Sexual activity: Yes     Partners: Male   Other Topics Concern    Not on file   Social History Narrative    Not on file     Social Determinants of Health     Financial Resource Strain: Low Risk     Difficulty of Paying Living Expenses: Not hard at all   Food Insecurity: No Food Insecurity    Worried About Running Out of Food in the Last Year: Never true    Toribio of Food in the Last Year: Never true   Transportation Needs:     Lack of Transportation (Medical):  Lack of Transportation (Non-Medical):    Physical Activity:     Days of Exercise per Week:     Minutes of Exercise per Session:    Stress:     Feeling of Stress :    Social Connections:     Frequency of Communication with Friends and Family:     Frequency of Social Gatherings with Friends and Family:     Attends Confucianist Services:     Active Member of Clubs or Organizations:     Attends Club or Organization Meetings:     Marital Status:    Intimate Partner Violence:     Fear of Current or Ex-Partner:     Emotionally Abused:     Physically Abused:     Sexually Abused:        TOBACCO: Dangelo Larry  reports that she has never smoked. She has never used smokeless tobacco.  ETOH: Pierre  reports previous alcohol use. Past Medical History:   Diagnosis Date    COVID-19 (7/22/2020) 8/10/2020    Dysmenorrhea     Intrahepatic cholestasis of pregnancy (G1) 7/22/2021    Menorrhagia       Metabolic monitoring is being done by PCP.    Family History   Problem Relation Age of Onset    High Blood Pressure Paternal Grandmother     Heart Disease Paternal Grandmother     Alcohol Abuse Mother     Substance Abuse Mother     Hypertension Father     Diabetes Father         borderline    Cancer Paternal Aunt 61        Brain Ca    Arthritis Maternal Grandmother     Osteoporosis Maternal Grandmother     Osteoarthritis Maternal Grandmother     Heart Disease Paternal Grandfather     High Blood Pressure Paternal Grandfather        Last Labs:   Lab Results   Component Value Date    LABA1C 5.8 09/14/2021     Lab Results   Component Value Date     09/14/2021      Lab Results   Component Value Date    WBC 12.1 (H) 10/07/2021    HGB 12.5 10/07/2021    HCT 36.0 10/07/2021    MCV 77.3 (L) 10/07/2021     10/07/2021    LYMPHOPCT 19 (L) 10/07/2021    RBC 4.66 10/07/2021    MCH 26.8 10/07/2021    MCHC 34.7 10/07/2021    RDW 23.3 (H) 10/07/2021          Lab Results   Component Value Date     10/07/2021    K 3.8 10/07/2021     10/07/2021    CO2 25 10/07/2021    BUN 8 10/07/2021    CREATININE 0.68 10/07/2021    GLUCOSE 87 10/07/2021    CALCIUM 9.5 10/07/2021    PROT 6.0 (L) 07/31/2021    LABALBU 3.0 (L) 07/31/2021    BILITOT 0.30 07/31/2021    ALKPHOS 272 (H) 07/31/2021    AST 22 07/31/2021    ALT 38 (H) 07/31/2021    LABGLOM >60 10/07/2021    GFRAA >60 10/07/2021      . last      Diagnosis:      1. Major depressive disorder, recurrent episode with anxious distress ([address]    2. Psychological trauma history    3. Depression, unspecified depression type        Plan:    Discussed increasing sertraline to 50mg/day. Discussed risks and benefits of medications, as well as need for yearly lab work. Follow up with Therapist for continued therapy. No orders of the defined types were placed in this encounter.      Orders Placed This Encounter   Medications    sertraline (ZOLOFT) 50 MG tablet     Sig: Take 1 tablet by mouth daily     Dispense:  30 tablet     Refill:  1       Pt interventions:    Discussed importance of medication adherence, Discussed risks, benefits, side effects of medication and need for follow up treatment, Discussed benefits of referral for specialty care and Discussed self-care (sleep, nutrition, rewarding activities, social support, exercise)

## 2021-10-28 NOTE — PROGRESS NOTES
Karin Betancourt  10/28/2021    Karin Betancourt (:  1995) has requested an audio/video evaluation for the following concern(s):    1. Postpartum depression          TELEHEALTH EVALUATION -- Audio/Visual (During KUAAD-36 public health emergency)      Karin Betancourt is a 23 y.o. female       She was here to follow-up regarding her labs and diagnostics ordered at her last visit for the diagnosis of:    ICD-10-CM    1. Postpartum depression  O99.345     F53.0        Follow up for PP depression. She delivered vaginally on 21. She is  breast feeding/bottlefeeding and there is not any signs or symptoms of mastitis. She has had to deal with many health issues with her son and now her grandmother is dealing with some health issues. She is struggling with severe post partum depression. She is seeing therapist and taking her buspar. She denies any suicidal thoughts with a plan, intent to harm others and delusional ideas. Today her lochia is moderate she denies any dizziness or shortness of breath.       She is feeling improvement at this time.     Past Medical History:   Diagnosis Date    COVID-19 (2020) 8/10/2020    Dysmenorrhea     Intrahepatic cholestasis of pregnancy (G1) 2021    Menorrhagia          Past Surgical History:   Procedure Laterality Date    ENDOMETRIAL BIOPSY  16    INTRAUTERINE DEVICE INSERTION  16     Nimo    INTRAUTERINE DEVICE REMOVAL  2017         Family History   Problem Relation Age of Onset    High Blood Pressure Paternal Grandmother     Heart Disease Paternal Grandmother     Alcohol Abuse Mother     Substance Abuse Mother     Hypertension Father     Diabetes Father         borderline    Cancer Paternal Aunt 61        [facility]    Arthritis Maternal Grandmother     Osteoporosis Maternal Grandmother     Osteoarthritis Maternal Grandmother     Heart Disease Paternal Grandfather     High Blood Pressure Paternal Grandfather          Social History     Tobacco Use    Smoking status: Never Smoker    Smokeless tobacco: Never Used   Vaping Use    Vaping Use: Never used   Substance Use Topics    Alcohol use: Not Currently     Alcohol/week: 0.0 standard drinks     Comment: once a month    Drug use: No         MEDICATIONS:  Current Outpatient Medications   Medication Sig Dispense Refill    sertraline (ZOLOFT) 50 MG tablet Take 1 tablet by mouth daily 30 tablet 1    Blood Pressure Monitoring (SM WRIST CUFF BP MONITOR) MISC Please take BP before taking medications or when feeling lightheaded/dizzy. 1 each 0    Prenatal Vit-Fe Fumarate-FA (PRENATAL VITAMIN PO) Take by mouth       No current facility-administered medications for this visit. ALLERGIES:  Allergies as of 10/13/2021 - Fully Reviewed 10/08/2021   Allergen Reaction Noted    Amoxil [amoxicillin] Swelling 02/26/2020     currently breastfeeding. PE is limited due to the virtual visit    Abdomen: Soft non-tender; good bowel sounds. No guarding, rebound or rigidity. No CVA tenderness bilaterally reported when questioned. (Viewed Virtually)    Extremities: No calf tenderness, DTR 2/4, and No edema bilaterally as inspected by video and palpation by the patient (Viewed Virtually)    Pelvic: (Virtual Visit-Not Completed)    Diagnostics:  No results found.       Lab Results:  Results for orders placed or performed during the hospital encounter of 10/07/21   CBC Auto Differential   Result Value Ref Range    WBC 12.1 (H) 3.5 - 11.0 k/uL    RBC 4.66 4.0 - 5.2 m/uL    Hemoglobin 12.5 12.0 - 16.0 g/dL    Hematocrit 36.0 36 - 46 %    MCV 77.3 (L) 80 - 100 fL    MCH 26.8 26 - 34 pg    MCHC 34.7 31 - 37 g/dL    RDW 23.3 (H) 11.5 - 14.9 %    Platelets 726 075 - 794 k/uL    MPV 7.4 6.0 - 12.0 fL    NRBC Automated NOT REPORTED per 100 WBC    Differential Type NOT REPORTED     Immature Granulocytes NOT REPORTED 0 %    Absolute Immature Granulocyte NOT REPORTED 0.00 - 0.30 k/uL    WBC Morphology NOT REPORTED RBC Morphology NOT REPORTED     Platelet Estimate NOT REPORTED     Seg Neutrophils 75 (H) 36 - 66 %    Lymphocytes 19 (L) 24 - 44 %    Monocytes 6 1 - 7 %    Eosinophils % 0 0 - 4 %    Basophils 0 0 - 2 %    Segs Absolute 9.07 1.3 - 9.1 k/uL    Absolute Lymph # 2.30 1.0 - 4.8 k/uL    Absolute Mono # 0.73 0.1 - 1.3 k/uL    Absolute Eos # 0.00 0.0 - 0.4 k/uL    Basophils Absolute 0.00 0.0 - 0.2 k/uL    Morphology MICROCYTOSIS PRESENT     Morphology ANISOCYTOSIS PRESENT     Morphology 1+ POLYCHROMASIA    Basic Metabolic Panel w/ Reflex to MG   Result Value Ref Range    Glucose 87 70 - 99 mg/dL    BUN 8 6 - 20 mg/dL    CREATININE 0.68 0.50 - 0.90 mg/dL    Bun/Cre Ratio NOT REPORTED 9 - 20    Calcium 9.5 8.6 - 10.4 mg/dL    Sodium 137 135 - 144 mmol/L    Potassium 3.8 3.7 - 5.3 mmol/L    Chloride 101 98 - 107 mmol/L    CO2 25 20 - 31 mmol/L    Anion Gap 11 9 - 17 mmol/L    GFR Non-African American >60 >60 mL/min    GFR African American >60 >60 mL/min    GFR Comment          GFR Staging NOT REPORTED    Troponin   Result Value Ref Range    Troponin, High Sensitivity <6 0 - 14 ng/L    Troponin T NOT REPORTED <0.03 ng/mL    Troponin Interp NOT REPORTED    TSH w/reflex to FT4   Result Value Ref Range    TSH 0.69 0.30 - 5.00 mIU/L   EKG 12 Lead   Result Value Ref Range    Ventricular Rate 74 BPM    Atrial Rate 74 BPM    P-R Interval 138 ms    QRS Duration 80 ms    Q-T Interval 386 ms    QTc Calculation (Bazett) 428 ms    P Axis 50 degrees    R Axis 27 degrees    T Axis 46 degrees           Assessment:   Diagnosis Orders   1. Postpartum depression       No chief complaint on file.       Patient Active Problem List    Diagnosis Date Noted    Sinus tachycardia 08/10/2020     Priority: High    Major depressive disorder, recurrent episode with anxious distress ([facility] [address] 10/18/2021    Psychological trauma history 10/18/2021     21 M Apg 8/9 Wt 4#13 2021    Intrahepatic cholestasis of pregnancy (G1) 2021    Essential hypertension 02/10/2021    COVID-19 (7/22/2020) 08/10/2020    Anxiety 08/10/2020       PLAN:  Return if symptoms worsen or fail to improve, for annual.  Psychiatry recommendations important  Consider exclusive bottle feeding given the demands on her body. - if so, D/C Reglan  Continue therapy, increase caloric intake and hydration. Counseled on preventative health maintenance follow-up. No orders of the defined types were placed in this encounter. Vicki Pack is a 23 y.o. female female was evaluated by a Virtual Visit (video visit) encounter to address concerns as mentioned above. A caregiver was present when appropriate. Due to this being a TeleHealth encounter (During HEPRP-92 public health emergency), evaluation of the following organ systems was limited: Vitals/Constitutional/EENT/Resp/CV/GI//MS/Neuro/Skin/Heme-Lymph-Imm. Pursuant to the emergency declaration under the [address] [place] [address] and the [employer] and [employer] Act, this Virtual Visit was conducted with patient's (and/or legal guardian's) consent, to reduce the patient's risk of exposure to COVID-19 and provide necessary medical care. The patient (and/or legal guardian) has also been advised to contact this office for worsening conditions or problems, and seek emergency medical treatment and/or call 911 if deemed necessary. Services were provided through a video synchronous discussion virtually to substitute for in-person clinic visit. Patient and provider were located at their individual homes. Electronically signed by Jluis Hines DO on 10/6/21 at 5:54 PM EDT     An electronic signature was used to authenticate this note. The Virtual Visit time of 20 minutes. More than 50% of this visit was counseling and education regarding The encounter diagnosis was Postpartum depression. and No chief complaint on file.    as well as  counseling on preventative health maintenance follow-up.

## 2021-12-22 DIAGNOSIS — F32.A DEPRESSION, UNSPECIFIED DEPRESSION TYPE: ICD-10-CM

## 2022-01-03 ENCOUNTER — HOSPITAL ENCOUNTER (OUTPATIENT)
Age: 27
Setting detail: SPECIMEN
Discharge: HOME | End: 2022-01-03

## 2022-01-03 ENCOUNTER — NURSE ONLY (OUTPATIENT)
Dept: FAMILY MEDICINE CLINIC | Age: 27
End: 2022-01-03

## 2022-01-03 NOTE — PROGRESS NOTES
Pt to exam room for covid testing  Verified name/spelling/ w/ patient  Swabbed nasopharyngeal and sent specimen to lab  Pt tolerated

## 2022-01-04 DIAGNOSIS — Z20.822 EXPOSURE TO COVID-19 VIRUS: ICD-10-CM

## 2022-01-04 DIAGNOSIS — J06.9 URI WITH COUGH AND CONGESTION: ICD-10-CM

## 2022-01-04 DIAGNOSIS — R19.7 DIARRHEA, UNSPECIFIED TYPE: ICD-10-CM

## 2022-01-06 LAB
SARS-COV-2: NORMAL
SARS-COV-2: NOT DETECTED
SOURCE: NORMAL

## 2022-03-08 ENCOUNTER — NURSE TRIAGE (OUTPATIENT)
Dept: OTHER | Facility: CLINIC | Age: 27
End: 2022-03-08

## 2022-03-08 ENCOUNTER — OFFICE VISIT (OUTPATIENT)
Dept: FAMILY MEDICINE CLINIC | Age: 27
End: 2022-03-08
Payer: COMMERCIAL

## 2022-03-08 ENCOUNTER — HOSPITAL ENCOUNTER (OUTPATIENT)
Age: 27
Setting detail: SPECIMEN
Discharge: HOME | End: 2022-03-08

## 2022-03-08 VITALS
HEART RATE: 91 BPM | DIASTOLIC BLOOD PRESSURE: 81 MMHG | TEMPERATURE: 97.4 F | OXYGEN SATURATION: 98 % | SYSTOLIC BLOOD PRESSURE: 132 MMHG

## 2022-03-08 DIAGNOSIS — R68.89 FLU-LIKE SYMPTOMS: ICD-10-CM

## 2022-03-08 DIAGNOSIS — R68.89 FLU-LIKE SYMPTOMS: Primary | ICD-10-CM

## 2022-03-08 DIAGNOSIS — R11.2 NON-INTRACTABLE VOMITING WITH NAUSEA, UNSPECIFIED VOMITING TYPE: ICD-10-CM

## 2022-03-08 DIAGNOSIS — R19.7 DIARRHEA, UNSPECIFIED TYPE: ICD-10-CM

## 2022-03-08 LAB
INFLUENZA A ANTIBODY: NEGATIVE
INFLUENZA B ANTIBODY: NEGATIVE

## 2022-03-08 PROCEDURE — 87804 INFLUENZA ASSAY W/OPTIC: CPT | Performed by: NURSE PRACTITIONER

## 2022-03-08 PROCEDURE — 99213 OFFICE O/P EST LOW 20 MIN: CPT | Performed by: NURSE PRACTITIONER

## 2022-03-08 RX ORDER — ONDANSETRON 4 MG/1
4 TABLET, ORALLY DISINTEGRATING ORAL EVERY 6 HOURS PRN
Qty: 15 TABLET | Refills: 0 | Status: SHIPPED | OUTPATIENT
Start: 2022-03-08 | End: 2022-04-19

## 2022-03-08 RX ORDER — FLUTICASONE PROPIONATE 50 MCG
2 SPRAY, SUSPENSION (ML) NASAL DAILY
Qty: 16 G | Refills: 0 | Status: SHIPPED | OUTPATIENT
Start: 2022-03-08 | End: 2022-04-19

## 2022-03-08 ASSESSMENT — ENCOUNTER SYMPTOMS
ABDOMINAL PAIN: 1
SWOLLEN GLANDS: 0
CHANGE IN BOWEL HABIT: 1
VOMITING: 1
DIARRHEA: 1
NAUSEA: 1
VISUAL CHANGE: 0
COUGH: 1
SORE THROAT: 1

## 2022-03-08 NOTE — PROGRESS NOTES
[address] [address]  Dept: [phone]  Dept Fax: [phone]    Erika Barbosa is a 23 y.o. female who presents to the urgent care today for her medical conditions/complaints as notedbelow. Erika Barbosa is c/o of Nausea & Vomiting (onset yesterday, unable to hold anything down), Diarrhea, Congestion (clear drainage), and Chills      HPI:     23 yr old female presents for c/o Nausea & Vomiting (onset yesterday, unable to hold anything down), Diarrhea, Congestion (clear drainage), and Chills  LMP 2/25/2022  Covid Vaccinated? 2 doses  Flu vax  Took her left over zofran and sx better, needs refill. Son tested neg for covid, but pt would like to be tested. Nausea & Vomiting  This is a new problem. The current episode started yesterday. The problem occurs intermittently. The problem has been unchanged. Associated symptoms include abdominal pain, anorexia, a change in bowel habit, chills, congestion, coughing, fatigue, headaches, myalgias, nausea, a sore throat, vomiting and weakness. Pertinent negatives include no arthralgias, chest pain, diaphoresis, fever, joint swelling, neck pain, numbness, rash, swollen glands, urinary symptoms, vertigo or visual change. Nothing aggravates the symptoms. She has tried nothing for the symptoms. The treatment provided no relief. Diarrhea   Associated symptoms include abdominal pain, chills, coughing, headaches, myalgias and vomiting. Pertinent negatives include no arthralgias or fever.        Past Medical History:   Diagnosis Date    COVID-19 (7/22/2020) 8/10/2020    Dysmenorrhea     Intrahepatic cholestasis of pregnancy (G1) 7/22/2021    Menorrhagia         Current Outpatient Medications   Medication Sig Dispense Refill    ondansetron (ZOFRAN ODT) 4 MG disintegrating tablet Take 1 tablet by mouth every 6 hours as needed for Nausea or Vomiting 15 tablet 0    fluticasone (FLONASE) 50 MCG/ACT nasal spray 2 sprays by Nasal route daily 16 g 0    Levonorgest-Eth Estrad-Fe Bisg (BALCOLTRA) 0.1-20 MG-MCG(21) TABS Take by mouth      Lactobacillus (PROBIOTIC ACIDOPHILUS PO) Take by mouth      sertraline (ZOLOFT) 50 MG tablet Take 1 tablet by mouth daily 30 tablet 1    Blood Pressure Monitoring ( WRIST CUFF BP MONITOR) MISC Please take BP before taking medications or when feeling lightheaded/dizzy. 1 each 0    Prenatal Vit-Fe Fumarate-FA (PRENATAL VITAMIN PO) Take by mouth       No current facility-administered medications for this visit. Allergies   Allergen Reactions    Amoxil [Amoxicillin] Swelling     Reported throat swelling        Subjective:      Review of Systems   Constitutional: Positive for chills and fatigue. Negative for diaphoresis and fever. HENT: Positive for congestion and sore throat. Respiratory: Positive for cough. Cardiovascular: Negative for chest pain. Gastrointestinal: Positive for abdominal pain, anorexia, change in bowel habit, diarrhea, nausea and vomiting. Musculoskeletal: Positive for myalgias. Negative for arthralgias, joint swelling and neck pain. Skin: Negative for rash. Neurological: Positive for weakness and headaches. Negative for vertigo and numbness. All other systems reviewed and are negative. 14 systems reviewed and negative except as listed in HPI. Objective:     Physical Exam  Vitals and nursing note reviewed. Constitutional:       General: She is not in acute distress. Appearance: Normal appearance. She is well-developed. She is not ill-appearing, toxic-appearing or diaphoretic. Comments: nontoxic   HENT:      Head: Normocephalic and atraumatic. Right Ear: Tympanic membrane, ear canal and external ear normal.      Left Ear: Tympanic membrane, ear canal and external ear normal.      Nose: Rhinorrhea present.       Mouth/Throat:      Mouth: Mucous membranes are moist. Pharynx: Posterior oropharyngeal erythema present. No oropharyngeal exudate. Comments: haily tonsils and pharynx mildly injected, tonsils not enlarged, no exudates  Swallows without difficulty  Eyes:      General: No scleral icterus. Right eye: No discharge. Left eye: No discharge. Extraocular Movements: Extraocular movements intact. Conjunctiva/sclera: Conjunctivae normal.      Pupils: Pupils are equal, round, and reactive to light. Cardiovascular:      Rate and Rhythm: Normal rate and regular rhythm. Pulses: Normal pulses. Heart sounds: Normal heart sounds. Pulmonary:      Effort: Pulmonary effort is normal. No respiratory distress. Breath sounds: Normal breath sounds. No stridor. No wheezing, rhonchi or rales. Chest:      Chest wall: No tenderness. Abdominal:      General: Bowel sounds are normal. There is no distension. Palpations: Abdomen is soft. Tenderness: There is no abdominal tenderness. Musculoskeletal:         General: No tenderness or deformity. Normal range of motion. Cervical back: Normal range of motion and neck supple. Lymphadenopathy:      Cervical: No cervical adenopathy. Skin:     General: Skin is warm and dry. Capillary Refill: Capillary refill takes less than 2 seconds. Findings: No rash ( no rash to visible skin). Neurological:      General: No focal deficit present. Mental Status: She is alert and oriented to person, place, and time. Motor: No abnormal muscle tone. Coordination: Coordination normal.   Psychiatric:         Mood and Affect: Mood normal.         Behavior: Behavior normal.       /81 (Site: Right Upper Arm, Position: Sitting, Cuff Size: Medium Adult)   Pulse 91   Temp 97.4 °F (36.3 °C) (Infrared)   SpO2 98%     Assessment:       Diagnosis Orders   1. Flu-like symptoms  POCT Influenza A/B    COVID-19   2. Non-intractable vomiting with nausea, unspecified vomiting type     3. Diarrhea, unspecified type         Plan:     Results for POC orders placed in visit on 22   POCT Influenza A/B   Result Value Ref Range    Influenza A Ab NEGATIVE     Influenza B Ab NEGATIVE          POCT flu A and B both neg  +  covid and flu vax  Mild uri sx with n/v/d for 1 day  Work note  Damari Garb refilled  flonase rx for pnd  Reviewed over-the-counter treatments for symptom management. Will send out COVID19 testing. Possible treatment alterations based on the results. Patient instructed to self-quarantine until testing results are back. Patient instructed not to return to work until results are back. Tylenol as needed for fever/pain. Encouraged adequate hydration and rest.  The patient indicates understanding of these issues and agrees with the plan. Educational materials provided on AVS.  Follow up if symptoms do not improve/worsen. Discussed symptoms that will warrant urgent ED evaluation/treatment. Return for make appt with Family Doc in 3-4 days for recheck. Orders Placed This Encounter   Medications    ondansetron (ZOFRAN ODT) 4 MG disintegrating tablet     Sig: Take 1 tablet by mouth every 6 hours as needed for Nausea or Vomiting     Dispense:  15 tablet     Refill:  0    fluticasone (FLONASE) 50 MCG/ACT nasal spray     Si sprays by Nasal route daily     Dispense:  16 g     Refill:  0         Patient given educational materials - see patient instructions. Discussed use, benefit, and side effects of prescribed medications. All patient questions answered. Pt voicedunderstanding.     Electronically signed by TERRENCE Vanegas CNP on 3/8/2022 at 12:50 PM

## 2022-03-08 NOTE — PATIENT INSTRUCTIONS
Follow up with family doctor in 1 week as needed. Return immediately if worse, new symptoms develop, symptoms persist or have any questions or concerns. Push fluids, keep hydrated  Cool mist humidifier bedside  Continue all medications as prescribed  May alternate tylenol/motrin over the counter for pain or fever, take per package instructions. Patient Education        Nausea and Vomiting: Care Instructions  Overview     When you are nauseated, you may feel weak and sweaty and notice a lot of saliva in your mouth. Nausea often leads to vomiting. Most of the time you do not need to worry about nausea and vomiting, but they can be signs of other illnesses. Two common causes of nausea and vomiting are a stomach infection and food poisoning. Nausea and vomiting from a viral stomach infection will usually start to improve within 24 hours. Nausea and vomiting from food poisoning may last from 12 to 48 hours. The doctor has checked you carefully, but problems can develop later. If you notice any problems or new symptoms, get medical treatment right away. Follow-up care is a key part of your treatment and safety. Be sure to make and go to all appointments, and call your doctor if you are having problems. It's also a good idea to know your test results and keep a list of the medicines you take. How can you care for yourself at home? · To prevent dehydration, drink plenty of fluids. Choose water and other clear liquids until you feel better. If you have kidney, heart, or liver disease and have to limit fluids, talk with your doctor before you increase the amount of fluids you drink. · Rest in bed until you feel better. · When you are able to eat, try clear soups, mild foods, and liquids until all symptoms are gone for 12 to 48 hours. Other good choices include dry toast, crackers, cooked cereal, and gelatin dessert, such as Jell-O. When should you call for help?    Call 911 anytime you think you may need emergency care. For example, call if:    · You passed out (lost consciousness). Call your doctor now or seek immediate medical care if:    · You have symptoms of dehydration, such as:  ? Dry eyes and a dry mouth. ? Passing only a little urine. ? Feeling thirstier than usual.     · You have new or worsening belly pain.     · You have a new or higher fever.     · You vomit blood or what looks like coffee grounds. Watch closely for changes in your health, and be sure to contact your doctor if:    · You have ongoing nausea and vomiting.     · Your vomiting is getting worse.     · Your vomiting lasts longer than 2 days.     · You are not getting better as expected. Where can you learn more? Go to [email or web address]://ZulahoopeWhite Castleeb.[email or web address] org and sign in to your [employer] account. Enter 26 [id number] in the [employer] box to learn more about \"Nausea and Vomiting: Care Instructions. \"     If you do not have an account, please click on the \"Sign Up Now\" link. Current as of: July 1, 2021               Content Version: 13.1  © 2006-2021 [employer]. Care instructions adapted under license by [facility] ([facility]). If you have questions about a medical condition or this instruction, always ask your healthcare professional. [place] [address] any warranty or liability for your use of this information. Patient Education        Diarrhea: Care Instructions  Overview     Diarrhea is loose, watery stools (bowel movements). The exact cause is often hard to find. Sometimes diarrhea is your body's way of getting rid of what caused an upset stomach. Viruses, food poisoning, and many medicines can cause diarrhea. Some people get diarrhea in response to emotional stress, anxiety, or certain foods. Almost everyone has diarrhea now and then. It usually isn't serious, and your stools will return to normal soon. The important thing to do is replace the fluids you have lost, so you can prevent dehydration.   The doctor has checked you carefully, but problems can develop later. If you notice any problems or new symptoms, get medical treatment right away. Follow-up care is a key part of your treatment and safety. Be sure to make and go to all appointments, and call your doctor if you are having problems. It's also a good idea to know your test results and keep a list of the medicines you take. How can you care for yourself at home? · Watch for signs of dehydration, which means your body has lost too much water. Dehydration is a serious condition and should be treated right away. Signs of dehydration are:  ? Increasing thirst and dry eyes and mouth. ? Feeling faint or lightheaded. ? A smaller amount of urine than normal.  · To prevent dehydration, drink plenty of fluids. Choose water and other clear liquids until you feel better. If you have kidney, heart, or liver disease and have to limit fluids, talk with your doctor before you increase the amount of fluids you drink. · When you feel like eating, start with small amounts of food. · The doctor may recommend that you take over-the-counter medicine, such as loperamide (Imodium). Read and follow all instructions on the label. Do not use this medicine if you have bloody diarrhea, a high fever, or other signs of serious illness. Call your doctor if you think you are having a problem with your medicine. When should you call for help? Call 911 anytime you think you may need emergency care. For example, call if:    · You passed out (lost consciousness).     · Your stools are maroon or very bloody. Call your doctor now or seek immediate medical care if:    · You are dizzy or lightheaded, or you feel like you may faint.     · Your stools are black and look like tar, or they have streaks of blood.     · You have new or worse belly pain.     · You have symptoms of dehydration, such as:  ? Dry eyes and a dry mouth. ? Passing only a little urine. ? Cannot keep fluids down.   · You have a new or higher fever. Watch closely for changes in your health, and be sure to contact your doctor if:    · Your diarrhea is getting worse.     · You see pus in the diarrhea.     · You are not getting better after 2 days (48 hours). Where can you learn more? Go to https://chpepiceweb.healthSopogy. org and sign in to your [employer] account. Enter R764 in the Thumbs Up box to learn more about \"Diarrhea: Care Instructions. \"     If you do not have an account, please click on the \"Sign Up Now\" link. Current as of: July 1, 2021               Content Version: 13.1  © 4311-5474 Healthwise, Incorporated. Care instructions adapted under license by [facility] ([facility]). If you have questions about a medical condition or this instruction, always ask your healthcare professional. [address] any warranty or liability for your use of this information.

## 2022-03-08 NOTE — LETTER
[facility]  [place] [address]  Phone: [phone]  Fax: [phone]    TERRENCE Betancourt CNP        March 8, 2022     Patient: Keerthi Triana   YOB: 1995   Date of Visit: 3/8/2022       To Whom It May Concern: It is my medical opinion that Keerthi Triana should remain out of work until [employer] are back in 1-4 days, pending negative result. .    If you have any questions or concerns, please don't hesitate to call.     Sincerely,        TERRENCE Betancourt CNP

## 2022-03-08 NOTE — TELEPHONE ENCOUNTER
Subjective: Caller states \"seen in walk in clinic today. Tested for COVID with PCR. Dx with stomach flu and PCP has me out for 1- 4 days or 2-3 days depending on COVID results\"     Current Symptoms: vomiting, nausea, flu like sx -  DOES NOT feel worse than when seen by walk in clinic today    Advised to follow PCP ordered on RTW information and depending on test results can report to work day if they are positive. Onset: na    Associated Symptoms: na    Pain Severity: na    Temperature: na    What has been tried: na    LMP: NA Pregnant: NA    Recommended disposition: Follow up as walk in clinic directed    Care advice provided, patient verbalizes understanding; denies any other questions or concerns; instructed to call back for any new or worsening symptoms. Patient/caller agrees to follow-up with PCP     Attention Provider: Thank you for allowing me to participate in the care of your patient. The patient was connected to triage in response to symptoms provided. Please do not respond through this encounter as the response is not directed to a shared pool. Reason for Disposition  Anahi Lopez Caller has already spoken with another triager or PCP (or office), and has further questions and triager able to answer questions.     Protocols used: NO CONTACT OR DUPLICATE CONTACT CALL-ADULT-[place]

## 2022-03-09 LAB
SARS-COV-2: NORMAL
SARS-COV-2: NOT DETECTED
SOURCE: NORMAL

## 2022-03-31 ENCOUNTER — PATIENT MESSAGE (OUTPATIENT)
Dept: OBGYN CLINIC | Age: 27
End: 2022-03-31

## 2022-03-31 RX ORDER — LEVONORGESTREL AND ETHINYL ESTRADIOL 0.1-0.02MG
1 KIT ORAL DAILY
Qty: 84 TABLET | Refills: 0 | Status: CANCELLED | OUTPATIENT
Start: 2022-03-31

## 2022-03-31 RX ORDER — LEVONORGESTREL AND ETHINYL ESTRADIOL 0.1-0.02MG
1 KIT ORAL DAILY
Qty: 84 TABLET | Refills: 0 | Status: SHIPPED | OUTPATIENT
Start: 2022-03-31 | End: 2022-03-31 | Stop reason: SDUPTHER

## 2022-03-31 RX ORDER — LEVONORGESTREL AND ETHINYL ESTRADIOL 0.1-0.02MG
1 KIT ORAL DAILY
Qty: 84 TABLET | Refills: 0 | Status: SHIPPED | OUTPATIENT
Start: 2022-03-31 | End: 2022-06-10 | Stop reason: SDUPTHER

## 2022-03-31 NOTE — TELEPHONE ENCOUNTER
From: Keerthi Triana  Sent: 3/31/2022 4:20 PM EDT  To: [facility] Clinical Support [facility]  Subject: Schedule pap    Hello I noticed my birth control was called into the wrong pharmacy it needs to go to [facility] if that can be changed please. Thanks!

## 2022-04-19 ENCOUNTER — HOSPITAL ENCOUNTER (OUTPATIENT)
Age: 27
Setting detail: SPECIMEN
Discharge: HOME | End: 2022-04-19

## 2022-04-19 ENCOUNTER — OFFICE VISIT (OUTPATIENT)
Dept: OBGYN CLINIC | Age: 27
End: 2022-04-19
Payer: COMMERCIAL

## 2022-04-19 VITALS
DIASTOLIC BLOOD PRESSURE: 70 MMHG | BODY MASS INDEX: 27.38 KG/M2 | WEIGHT: 145 LBS | SYSTOLIC BLOOD PRESSURE: 120 MMHG | HEIGHT: 61 IN

## 2022-04-19 DIAGNOSIS — R53.83 OTHER FATIGUE: ICD-10-CM

## 2022-04-19 DIAGNOSIS — Z01.419 ENCOUNTER FOR ANNUAL ROUTINE GYNECOLOGICAL EXAMINATION: Primary | ICD-10-CM

## 2022-04-19 DIAGNOSIS — R23.3 EASY BRUISING: ICD-10-CM

## 2022-04-19 PROCEDURE — 99395 PREV VISIT EST AGE 18-39: CPT | Performed by: OBSTETRICS & GYNECOLOGY

## 2022-04-20 NOTE — PROGRESS NOTES
History and Physical  [facility]    Karo Multani  2022              23 y.o. Chief Complaint   Patient presents with    Gynecologic Exam     last pap 2020- negative       Patient's last menstrual period was 2022. Primary Care Physician: Dani Morrow PA-C    The patient was seen and examined. She has no chief complaint today and is here for her annual exam.  Her bowels are regular. There are no voiding complaints. She denies any bloating. She denies vaginal discharge and was counseled on STD's and the need for barrier contraception. HPI : Karo Multani is a 23 y.o. female     Pt. States she is doing well. She is feeling well mentally and emotionally. She is concerned with bruising and fatigue she has had since delivery and is worsening. She had  22. Declining cultures.   Bowel and bladder without dysfunction    no Bloating  no Early Satiety  no Unexplained weight change of more than 15 lbs  no  PMB  no  PCB  ________________________________________________________________________  OB History    Para Term  AB Living   1 1 0 1 0 1   SAB IAB Ectopic Molar Multiple Live Births   0 0 0 0 0 1      # Outcome Date GA Lbr Von/2nd Weight Sex Delivery Anes PTL Lv   1  21 36w1d / 00:44 4 lb 13.3 oz (2.19 kg) M Vag-Spont EPI Y SELAM      Name: Jen Barraza: 8  Apgar5: 9     Past Medical History:   Diagnosis Date    COVID-19 (2020) 8/10/2020    Dysmenorrhea     Intrahepatic cholestasis of pregnancy (G1) 2021    Menorrhagia                                                                    Past Surgical History:   Procedure Laterality Date    ENDOMETRIAL BIOPSY  16    INTRAUTERINE DEVICE INSERTION  16     Nimo    INTRAUTERINE DEVICE REMOVAL  2017     Family History   Problem Relation Age of Onset    High Blood Pressure Paternal Grandmother     Heart Disease Paternal Grandmother     Alcohol Abuse Mother     Substance Abuse Mother     Hypertension Father     Diabetes Father         borderline    Cancer Paternal Aunt 61        Brain Ca    Arthritis Maternal Grandmother     Osteoporosis Maternal Grandmother     Osteoarthritis Maternal Grandmother     Heart Disease Paternal Grandfather     High Blood Pressure Paternal Grandfather      Social History     Socioeconomic History    Marital status: Single     Spouse name: Not on file    Number of children: Not on file    Years of education: Not on file    Highest education level: Not on file   Occupational History    Not on file   Tobacco Use    Smoking status: Never Smoker    Smokeless tobacco: Never Used   Vaping Use    Vaping Use: Never used   Substance and Sexual Activity    Alcohol use: Not Currently     Alcohol/week: 0.0 standard drinks     Comment: once a month    Drug use: No    Sexual activity: Yes     Partners: Male   Other Topics Concern    Not on file   Social History Narrative    Not on file     Social Determinants of Health     Financial Resource Strain: Low Risk     Difficulty of Paying Living Expenses: Not hard at all   Food Insecurity: No Food Insecurity    Worried About Running Out of Food in the Last Year: Never true    Toribio of Food in the Last Year: Never true   Transportation Needs:     Lack of Transportation (Medical): Not on file    Lack of Transportation (Non-Medical):  Not on file   Physical Activity:     Days of Exercise per Week: Not on file    Minutes of Exercise per Session: Not on file   Stress:     Feeling of Stress : Not on file   Social Connections:     Frequency of Communication with Friends and Family: Not on file    Frequency of Social Gatherings with Friends and Family: Not on file    Attends [employer]ology Services: Not on file    Active Member of Clubs or Organizations: Not on file    Attends Club or Organization Meetings: Not on file    Marital Status: Not on file   Intimate Partner Violence:     Fear of Current or Ex-Partner: Not on file    Emotionally Abused: Not on file    Physically Abused: Not on file    Sexually Abused: Not on file   Housing Stability:     Unable to Pay for Housing in the Last Year: Not on file    Number of Places Lived in the Last Year: Not on file    Unstable Housing in the Last Year: Not on file       MEDICATIONS:  Current Outpatient Medications   Medication Sig Dispense Refill    Levonorgest-Eth Estrad-Fe Bisg (BALCOLTRA) 0.1-20 MG-MCG(21) TABS Take 1 tablet by mouth daily 84 tablet 0    sertraline (ZOLOFT) 50 MG tablet Take 1 tablet by mouth daily 90 tablet 1    Lactobacillus (PROBIOTIC ACIDOPHILUS PO) Take by mouth      Prenatal Vit-Fe Fumarate-FA (PRENATAL VITAMIN PO) Take by mouth       No current facility-administered medications for this visit. ALLERGIES:  Allergies as of 04/19/2022 - Fully Reviewed 04/19/2022   Allergen Reaction Noted    Amoxil [amoxicillin] Swelling 02/26/2020       Symptoms of decreased mood absent    Immunization status: stated as current, but no records available. Gynecologic History:     Patient's last menstrual period was 03/24/2022. Sexually Active: Yes    Permanent Sterilization: No   Reversible Birth Control: Yes Balcoltra        Hormone Replacement Exposure: No      Genetic Qualified Family History of Breast, Ovarian , Colon or Uterine Cancer: No     If YES see scanned worksheet.     Preventative Health Testing:    Health Maintenance:  Health Maintenance Due   Topic Date Due    Varicella vaccine (2 of 2 - 2-dose childhood series) 05/07/2000    HPV vaccine (1 - 2-dose series) Never done       Date of Last Pap Smear: 9/30/20 - normal  Abnormal Pap Smear History: denies  Colposcopy History:   Date of Last Mammogram: none  Date of Last Colonoscopy:   Date of Last Bone Density:      ________________________________________________________________________        REVIEW OF SYSTEMS:       A minimum of an eleven point review of systems was completed. Review Of Systems (11 point):  Constitutional: No fever, chills or malaise; No weight change +fatigue  Head and Eyes: No vision changes, Headache, Dizziness or trauma in last 12 months  ENT ROS: No hearing, Tinnitis, sinus or taste problems  Hematological and Lymphatic ROS:No Lymphoma, Von Willebrand's, Hemophillia or Bleeding History +easy bruising  Psych ROS: No Depression, Homicidal thoughts,suicidal thoughts, or anxiety  Breast ROS: No breast abnormalities or lumps  Respiratory ROS: No SOB, Pneumoniae,Cough, or Pulmonary Embolism   Cardiovascular ROS: No Chest Pain with Exertion, Palpitations, Syncope, Edema, Arrhythmia  Gastrointestinal ROS: No Indigestion, Heartburn, Nausea, vomiting, Diarrhea, Constipation,or Bowel Changes; No Bloody Stools or melena  Genito-Urinary ROS: No Dysuria, Hematuria or Nocturia. No Urinary Incontinence or Vaginal Discharge  Musculoskeletal ROS: No Arthralgia, Arthritis,Gout,Osteoporosis or Rheumatism  Neurological ROS: No CVA, Migraines, Epilepsy, Seizure Hx, or Limb Weakness  Dermatological ROS: No Rash, Itching, Hives, Mole Changes or Cancer                                                                                                                                                                                                                                  PHYSICAL Exam:     Constitutional:  Vitals:    04/19/22 1130   BP: 120/70   Site: Right Upper Arm   Position: Sitting   Cuff Size: Medium Adult   Weight: 145 lb (65.8 kg)   Height: 5' 1\" (1.549 m)       Chaperone for Intimate Exam   Chaperone was offered and accepted as part of the rooming process.  Chaperone: Fredy Romo    General Appearance: This  is a well Developed, well Nourished, well groomed female. Her BMI was reviewed. Nutritional decision making was discussed. Skin:  There was a Normal Inspection of the skin without rashes or lesions. There were no rashes. Lymphatic:  No Lymph Nodes were Palpable in the neck , axilla or groin.  0 # Of Lymph Nodes     Neck and EENT:  The neck was supple. There were no masses   The thyroid was not enlarged and had no masses. EOMI B/L    Respiratory: The lungs were auscultated and found to be clear. There were no rales, rhonchi or wheezes. There was a good respiratory effort. Cardiovascular: The heart was in a regular rate and rhythm. . No S3 or S4. There was no murmur appreciated. Location, grade, and radiation are not applicable. Extremities: The patients extremities were without calf tenderness, edema, or varicosities. There was full range of motion in all four extremities. Pulses in all four extremities were appreciated and are 2/4. Abdomen: The abdomen was soft and non-tender. There were good bowel sounds in all quadrants and there was no guarding, rebound or rigidity. Abdominal Scars:     Psych: The patient had a normal Orientation to: Time, Place, Person, and Situation  There is no Mood / Affect changes    Breast:  (Chest)  normal appearance, no masses or tenderness  Self breast exams were reviewed in detail. Literature was given. Pelvic Exam:  External genitalia: normal general appearance  Urinary system: urethral meatus normal  Vaginal: normal mucosa without prolapse or lesions  Cervix: normal appearance  Adnexa: normal bimanual exam  Uterus: normal single, nontender    Rectal Exam:  exam declined by patient          Musculosk:  Normal Gait and station was noted. Digits were evaluated without abnormal findings. Range of motion, stability and strength were evaluated and found to be appropriate for the patients age. ASSESSMENT:      23 y. o. Annual   Diagnosis Orders   1. Encounter for annual routine gynecological examination  PAP SMEAR   2. Easy bruising  CBC with Auto Differential    TSH With Reflex Ft4    Protime-INR    APTT   3.  Other fatigue  CBC with Auto Differential    TSH With Reflex Ft4    Protime-INR    APTT          Chief Complaint   Patient presents with    Gynecologic Exam     last pap - negative          Past Medical History:   Diagnosis Date    COVID-19 (2020) 8/10/2020    Dysmenorrhea     Intrahepatic cholestasis of pregnancy (G1) 2021    Menorrhagia          Patient Active Problem List    Diagnosis Date Noted    Sinus tachycardia 08/10/2020     Priority: High    Major depressive disorder, recurrent episode with anxious distress ([facility] [address] 10/18/2021    Psychological trauma history 10/18/2021     22 M Apg 8/9 Wt 4#13 2021    Intrahepatic cholestasis of pregnancy (G1) 2021    Essential hypertension 02/10/2021    COVID-19 (2020) 08/10/2020    Anxiety 08/10/2020          Hereditary Breast, Ovarian, Colon and Uterine Cancer screening Done. Tobacco & Secondary smoke risks reviewed; instructed on cessation and avoidance      Counseling Completed:  Preventative Health Recommendations and Follow up. PLAN:  Return in about 1 year (around 2023), or if symptoms worsen or fail to improve, for annual.  Repeat Annual every 1 year  Cervical Cytology Evaluation begins at 24years old. If Negative Cytology, Follow-up screening per current guidelines. Birth control and barrier recommendations discussed. STD counseling and prevention reviewed. Gardisil counseling completed for all patients 10-37 yo. Routine health maintenance per patients PCP. Orders Placed This Encounter   Procedures    PAP SMEAR     Patient History:    No LMP recorded.   OBGYN Status: Recent pregnancy  Past Surgical History:  16: ENDOMETRIAL BIOPSY  16 : INTRAUTERINE DEVICE INSERTION      Comment:  Nimo  2017: INTRAUTERINE DEVICE REMOVAL  Medications/Contraceptives Affecting Cytology     Combination Contraceptives - Oral Disp Start End     Levonorgest-Eth Estrad-Fe Bisg (BALCOLTRA) 0.1-20 MG-MCG(21) TABS    84 tablet 3/31/2022     Sig: Take 1 tablet by mouth daily    Route: Oral       Social History    Tobacco Use      Smoking status: Never Smoker      Smokeless tobacco: Never Used       Standing Status:   Future     Standing Expiration Date:   4/20/2023     Order Specific Question:   Collection Type     Answer: Thin Prep     Order Specific Question:   Prior Abnormal Pap Test     Answer:   No     Order Specific Question:   Screening or Diagnostic     Answer:   Screening     Order Specific Question:   HPV Requested? Answer:   Yes -  If ASCUS Reflex HPV     Order Specific Question:   High Risk Patient     Answer:   N/A    CBC with Auto Differential     Standing Status:   Future     Standing Expiration Date:   4/19/2023    TSH With Reflex Ft4     Standing Status:   Future     Standing Expiration Date:   4/19/2023    Protime-INR     Standing Status:   Future     Standing Expiration Date:   4/19/2023     Order Specific Question:   Daily Coumadin Dose? Answer:   N    APTT     Standing Status:   Future     Standing Expiration Date:   4/19/2023     Order Specific Question:   Daily Heparin Dose? Answer:   none           The patient, Shanel Dalton is a 23 y.o. female, was seen with a total time spent of 20 minutes for the visit on this date of service by the E/M provider. The time component had both face to face and non face to face time spent in determining the total time component. Counseling and education regarding her diagnosis listed below and her options regarding those diagnoses were also included in determining her time component. Diagnosis Orders   1. Encounter for annual routine gynecological examination  PAP SMEAR   2. Easy bruising  CBC with Auto Differential    TSH With Reflex Ft4    Protime-INR    APTT   3.  Other fatigue  CBC with Auto Differential    TSH With Reflex Ft4    Protime-INR    APTT        The patient had her preventative health maintenance recommendations and follow-up reviewed with her at the completion of her visit.

## 2022-04-27 LAB — CYTOLOGY REPORT: NORMAL

## 2022-05-13 ENCOUNTER — HOSPITAL ENCOUNTER (OUTPATIENT)
Age: 27
Setting detail: SPECIMEN
Discharge: HOME | End: 2022-05-13

## 2022-05-13 DIAGNOSIS — E55.9 VITAMIN D INSUFFICIENCY: ICD-10-CM

## 2022-05-13 DIAGNOSIS — R53.83 OTHER FATIGUE: ICD-10-CM

## 2022-05-13 DIAGNOSIS — Z13.1 ENCOUNTER FOR SCREENING FOR DIABETES MELLITUS: ICD-10-CM

## 2022-05-13 DIAGNOSIS — Z13.220 SCREENING CHOLESTEROL LEVEL: ICD-10-CM

## 2022-05-13 DIAGNOSIS — F33.1 MODERATE EPISODE OF RECURRENT MAJOR DEPRESSIVE DISORDER (HCC): ICD-10-CM

## 2022-05-13 DIAGNOSIS — F41.9 ANXIETY: ICD-10-CM

## 2022-05-13 DIAGNOSIS — R23.3 EASY BRUISING: ICD-10-CM

## 2022-05-13 LAB
ABSOLUTE EOS #: 0.04 K/UL (ref 0–0.44)
ABSOLUTE IMMATURE GRANULOCYTE: 0.03 K/UL (ref 0–0.3)
ABSOLUTE LYMPH #: 2.41 K/UL (ref 1.1–3.7)
ABSOLUTE MONO #: 0.52 K/UL (ref 0.1–1.2)
ALBUMIN SERPL-MCNC: 4.3 G/DL (ref 3.5–5.2)
ALBUMIN/GLOBULIN RATIO: 1.4 (ref 1–2.5)
ALP BLD-CCNC: 89 U/L (ref 35–104)
ALT SERPL-CCNC: 24 U/L (ref 5–33)
ANION GAP SERPL CALCULATED.3IONS-SCNC: 14 MMOL/L (ref 9–17)
AST SERPL-CCNC: 17 U/L
BASOPHILS # BLD: 1 % (ref 0–2)
BASOPHILS ABSOLUTE: 0.05 K/UL (ref 0–0.2)
BILIRUB SERPL-MCNC: 0.38 MG/DL (ref 0.3–1.2)
BUN BLDV-MCNC: 12 MG/DL (ref 6–20)
CALCIUM SERPL-MCNC: 9.2 MG/DL (ref 8.6–10.4)
CHLORIDE BLD-SCNC: 103 MMOL/L (ref 98–107)
CHOLESTEROL/HDL RATIO: 4
CHOLESTEROL: 214 MG/DL
CO2: 20 MMOL/L (ref 20–31)
CREAT SERPL-MCNC: 0.74 MG/DL (ref 0.5–0.9)
EOSINOPHILS RELATIVE PERCENT: 1 % (ref 1–4)
ESTIMATED AVERAGE GLUCOSE: 117 MG/DL
GFR AFRICAN AMERICAN: >60 ML/MIN
GFR NON-AFRICAN AMERICAN: >60 ML/MIN
GFR SERPL CREATININE-BSD FRML MDRD: NORMAL ML/MIN/{1.73_M2}
GLUCOSE BLD-MCNC: 85 MG/DL (ref 70–99)
HBA1C MFR BLD: 5.7 % (ref 4–6)
HCT VFR BLD CALC: 45 % (ref 36.3–47.1)
HDLC SERPL-MCNC: 54 MG/DL
HEMOGLOBIN: 14.4 G/DL (ref 11.9–15.1)
IMMATURE GRANULOCYTES: 0 %
INR BLD: 1
LDL CHOLESTEROL: 136 MG/DL (ref 0–130)
LYMPHOCYTES # BLD: 27 % (ref 24–43)
MCH RBC QN AUTO: 26.6 PG (ref 25.2–33.5)
MCHC RBC AUTO-ENTMCNC: 32 G/DL (ref 28.4–34.8)
MCV RBC AUTO: 83 FL (ref 82.6–102.9)
MONOCYTES # BLD: 6 % (ref 3–12)
NRBC AUTOMATED: 0 PER 100 WBC
PARTIAL THROMBOPLASTIN TIME: 27.1 SEC (ref 20.5–30.5)
PDW BLD-RTO: 15.5 % (ref 11.8–14.4)
PLATELET # BLD: 473 K/UL (ref 138–453)
PMV BLD AUTO: 10.5 FL (ref 8.1–13.5)
POTASSIUM SERPL-SCNC: 4 MMOL/L (ref 3.7–5.3)
PROTHROMBIN TIME: 10.3 SEC (ref 9.1–12.3)
RBC # BLD: 5.42 M/UL (ref 3.95–5.11)
RBC # BLD: ABNORMAL 10*6/UL
SEG NEUTROPHILS: 65 % (ref 36–65)
SEGMENTED NEUTROPHILS ABSOLUTE COUNT: 5.73 K/UL (ref 1.5–8.1)
SODIUM BLD-SCNC: 137 MMOL/L (ref 135–144)
TOTAL PROTEIN: 7.4 G/DL (ref 6.4–8.3)
TRIGL SERPL-MCNC: 118 MG/DL
TSH SERPL DL<=0.05 MIU/L-ACNC: 1.26 UIU/ML (ref 0.3–5)
VITAMIN D 25-HYDROXY: 26.5 NG/ML
WBC # BLD: 8.8 K/UL (ref 3.5–11.3)

## 2022-05-16 PROBLEM — D75.839 THROMBOCYTOSIS: Status: ACTIVE | Noted: 2022-05-16

## 2022-05-20 ENCOUNTER — HOSPITAL ENCOUNTER (OUTPATIENT)
Age: 27
Setting detail: SPECIMEN
Discharge: HOME | End: 2022-05-20

## 2022-05-20 DIAGNOSIS — R53.83 FATIGUE, UNSPECIFIED TYPE: ICD-10-CM

## 2022-05-20 DIAGNOSIS — D75.839 THROMBOCYTOSIS: ICD-10-CM

## 2022-05-20 LAB
FOLATE: 13.8 NG/ML
IRON SATURATION: 11 % (ref 20–55)
IRON: 63 UG/DL (ref 37–145)
TOTAL IRON BINDING CAPACITY: 572 UG/DL (ref 250–450)
UNSATURATED IRON BINDING CAPACITY: 509 UG/DL (ref 112–347)
VITAMIN B-12: 552 PG/ML (ref 232–1245)

## 2022-06-06 ENCOUNTER — HOSPITAL ENCOUNTER (OUTPATIENT)
Age: 27
Setting detail: SPECIMEN
Discharge: HOME | End: 2022-06-06

## 2022-06-06 ENCOUNTER — HOSPITAL ENCOUNTER (OUTPATIENT)
Facility: CLINIC | Age: 27
Discharge: HOME | End: 2022-06-08

## 2022-06-06 ENCOUNTER — HOSPITAL ENCOUNTER (OUTPATIENT)
Dept: GENERAL RADIOLOGY | Facility: CLINIC | Age: 27
Discharge: HOME | End: 2022-06-08

## 2022-06-06 DIAGNOSIS — R53.83 FATIGUE, UNSPECIFIED TYPE: ICD-10-CM

## 2022-06-06 DIAGNOSIS — R07.89 CHEST HEAVINESS: ICD-10-CM

## 2022-06-06 DIAGNOSIS — Z86.16 HISTORY OF 2019 NOVEL CORONAVIRUS DISEASE (COVID-19): ICD-10-CM

## 2022-06-06 LAB — D-DIMER QUANTITATIVE: <0.17 MG/L FEU

## 2022-06-06 PROCEDURE — 71046 X-RAY EXAM CHEST 2 VIEWS: CPT

## 2022-06-10 RX ORDER — LEVONORGESTREL AND ETHINYL ESTRADIOL 0.1-0.02MG
KIT ORAL
Qty: 84 TABLET | Refills: 0 | Status: SHIPPED | OUTPATIENT
Start: 2022-06-10 | End: 2022-09-06 | Stop reason: SDUPTHER

## 2022-07-01 ENCOUNTER — INITIAL CONSULT (OUTPATIENT)
Dept: ONCOLOGY | Age: 27
End: 2022-07-01

## 2022-07-01 ENCOUNTER — TELEPHONE (OUTPATIENT)
Dept: ONCOLOGY | Age: 27
End: 2022-07-01

## 2022-07-01 VITALS
DIASTOLIC BLOOD PRESSURE: 84 MMHG | SYSTOLIC BLOOD PRESSURE: 124 MMHG | WEIGHT: 146.2 LBS | TEMPERATURE: 97.2 F | HEIGHT: 61 IN | HEART RATE: 79 BPM | BODY MASS INDEX: 27.6 KG/M2

## 2022-07-01 DIAGNOSIS — E61.1 IRON DEFICIENCY: ICD-10-CM

## 2022-07-01 DIAGNOSIS — D75.839 THROMBOCYTOSIS: Primary | ICD-10-CM

## 2022-07-01 DIAGNOSIS — D50.9 IRON DEFICIENCY ANEMIA, UNSPECIFIED IRON DEFICIENCY ANEMIA TYPE: ICD-10-CM

## 2022-07-01 DIAGNOSIS — E61.1 IRON DEFICIENCY: Primary | ICD-10-CM

## 2022-07-01 PROCEDURE — 99244 OFF/OP CNSLTJ NEW/EST MOD 40: CPT | Performed by: INTERNAL MEDICINE

## 2022-07-01 NOTE — PROGRESS NOTES
_           Ms. Erica Dobbs is a very pleasant 33 y.o. female with history of multiple co morbidities as listed. Patient was referred for evaluation of thrombocytosis. Patient had childbirth July 31, 2021. She was on prenatal vitamins at that time. After childbearing she is having heavy menses. She passed clots. He had repeated labs showed thrombocytosis. She was restarted on prenatal vitamins again. Patient is complaining of weakness and fatigue. She is craving ice. She is having muscle cramps. She has shortness of breath on exertion. Patient had COVID infection last year. She did have some cardiac problems and she continues follow-up with cardiology. Her cardiac problems corrected during her pregnancy. No smoking or alcohol drinking. Ana Luisa Miramontes PAST MEDICAL HISTORY: has a past medical history of COVID-19 (7/22/2020), Dysmenorrhea, Intrahepatic cholestasis of pregnancy (G1), and Menorrhagia. PAST SURGICAL HISTORY: has a past surgical history that includes intrauterine device insertion (5/5/16 ); Endometrial biopsy (5/5/16); and Intrauterine Device Removal (03/14/2017). CURRENT MEDICATIONS:  has a current medication list which includes the following prescription(s): prenatal vit-fe fumarate-fa, balcoltra, ferrous sulfate, sertraline, vitamin d3, prednisone, sulfamethoxazole-trimethoprim, mupirocin, [DISCONTINUED] venlafaxine, and [DISCONTINUED] metoclopramide. ALLERGIES:  is allergic to amoxil [amoxicillin]. FAMILY HISTORY: Negative for any hematological or oncological conditions. SOCIAL HISTORY:  reports that she has never smoked. She has never used smokeless tobacco. She reports previous alcohol use. She reports that she does not use drugs. REVIEW OF SYSTEMS:     · General: Positive for weakness and fatigue. No unanticipated weight loss or decreased appetite. No fever or chills.    · Eyes: No blurred vision, eye pain or double vision. · Ears: No hearing problems or drainage. No tinnitus. · Throat: No sore throat, problems with swallowing or dysphagia. · Respiratory: No cough, sputum or hemoptysis. Positive for exertional shortness of breath. No pleuritic chest pain. · Cardiovascular: No chest pain, orthopnea or PND. No lower extremity edema. No palpitation. · Gastrointestinal: No problems with swallowing. No abdominal pain or bloating. No nausea or vomiting. No diarrhea or constipation. No GI bleeding. · Genitourinary: No dysuria, hematuria, frequency or urgency. · Musculoskeletal: No muscle aches or pains. No limitation of movement. No back pain. No gait disturbance, No joint complaints. · Dermatologic: No skin rashes or pruritus. No skin lesions or discolorations. · Psychiatric: No depression, anxiety, or stress or signs of schizophrenia. No change in mood or affect. · Hematologic: No history of bleeding tendency. No bruises or ecchymosis. No history of clotting problems. · Infectious disease: No fever, chills or frequent infections. · Endocrine: No polydipsia or polyuria. No temperature intolerance. · Neurologic: No headaches or dizziness. No weakness or numbness of the extremities. No changes in balance, coordination,  memory, mentation, behavior. · Allergic/Immunologic: No nasal congestion or hives. No repeated infections. PHYSICAL EXAM:  The patient is not in acute distress. Vital signs: Blood pressure 124/84, pulse 79, temperature 97.2 °F (36.2 °C), temperature source Temporal, height 5' 1\" (1.549 m), weight 146 lb 3.2 oz (66.3 kg), currently breastfeeding.      General appearance - well appearing, not in pain or distress  Mental status - good mood, alert and oriented  Eyes - pupils equal and reactive, extraocular eye movements intact  Ears - bilateral TM's and external ear canals normal  Nose - normal and patent, no erythema, discharge or polyps  Mouth - mucous membranes moist, pharynx normal without lesions  Neck - supple, no significant adenopathy  Lymphatics - no palpable lymphadenopathy, no hepatosplenomegaly  Chest - clear to auscultation, no wheezes, rales or rhonchi, symmetric air entry  Heart - normal rate, regular rhythm, normal S1, S2, no murmurs, rubs, clicks or gallops  Abdomen - soft, nontender, nondistended, no masses or organomegaly  Neurological - alert, oriented, normal speech, no focal findings or movement disorder noted  Musculoskeletal - no joint tenderness, deformity or swelling  Extremities - peripheral pulses normal, no pedal edema, no clubbing or cyanosis  Skin - normal coloration and turgor, no rashes, no suspicious skin lesions noted     Review of Diagnostic data:   Lab Results   Component Value Date    WBC 8.8 05/13/2022    HGB 14.4 05/13/2022    HCT 45.0 05/13/2022    MCV 83.0 05/13/2022     (H) 05/13/2022       Chemistry        Component Value Date/Time     05/13/2022 1021    K 4.0 05/13/2022 1021     05/13/2022 1021    CO2 20 05/13/2022 1021    BUN 12 05/13/2022 1021    CREATININE 0.74 05/13/2022 1021        Component Value Date/Time    CALCIUM 9.2 05/13/2022 1021    ALKPHOS 89 05/13/2022 1021    AST 17 05/13/2022 1021    ALT 24 05/13/2022 1021    BILITOT 0.38 05/13/2022 1021            IMPRESSION:   Mild thrombocytosis  Symptoms of iron deficiency  Past history of anemia  Heavy menses  COVID-19 infection early 2021    PLAN: Records, labs and images were reviewed and discussed with the patient. I explained to the patient the nature of this problem with thrombocytosis and possible underlying causes. Considering patient's history I think this is reactive thrombocytosis. Likely secondary to degree of iron deficiency with patient's history of heavy menses. Patient has classical symptoms of iron deficiency anemia.   She was started on prenatal vitamins recently and I believe she will have very good response with improvement of her symptoms and probable recovery of the high platelets. Or explained to the patient. She will continue prenatal vitamins and I will repeat the CBC and iron studies in about 3 months. If we see persistent thrombocytosis we will consider further work-up to rule out primary/essential thrombocytosis. At this point I believe it is unlikely. Patient's questions were answered to the best of her satisfaction and she verbalized full understanding and agreement.

## 2022-07-01 NOTE — TELEPHONE ENCOUNTER
AVS from 07/01/22     Prenatal vitamin daily  RV 3 months with CBC and iron studies before RV       Pt acknowledged to keep taking prenatal vitamin. RV scheduled for 9/23/22 at 10:15 am.  PT will complete labs prior to visit. (cbc,iron studies)    PT was given AVS and appt schedule

## 2022-07-06 ENCOUNTER — OFFICE VISIT (OUTPATIENT)
Dept: FAMILY MEDICINE CLINIC | Age: 27
End: 2022-07-06

## 2022-07-06 VITALS
TEMPERATURE: 98.1 F | DIASTOLIC BLOOD PRESSURE: 81 MMHG | HEART RATE: 78 BPM | SYSTOLIC BLOOD PRESSURE: 120 MMHG | OXYGEN SATURATION: 98 %

## 2022-07-06 DIAGNOSIS — L08.9 SKIN INFECTION: Primary | ICD-10-CM

## 2022-07-06 DIAGNOSIS — R60.9 SWELLING: ICD-10-CM

## 2022-07-06 DIAGNOSIS — T63.461A WASP STING, ACCIDENTAL OR UNINTENTIONAL, INITIAL ENCOUNTER: ICD-10-CM

## 2022-07-06 PROCEDURE — 99213 OFFICE O/P EST LOW 20 MIN: CPT

## 2022-07-06 RX ORDER — PREDNISONE 20 MG/1
40 TABLET ORAL DAILY
Qty: 10 TABLET | Refills: 0 | Status: SHIPPED | OUTPATIENT
Start: 2022-07-06 | End: 2022-07-11

## 2022-07-06 RX ORDER — SULFAMETHOXAZOLE AND TRIMETHOPRIM 800; 160 MG/1; MG/1
1 TABLET ORAL 2 TIMES DAILY
Qty: 14 TABLET | Refills: 0 | Status: SHIPPED | OUTPATIENT
Start: 2022-07-06 | End: 2022-07-13

## 2022-07-06 ASSESSMENT — ENCOUNTER SYMPTOMS
EYE PAIN: 0
SHORTNESS OF BREATH: 0
SORE THROAT: 0
DIARRHEA: 0
EYE DISCHARGE: 0
RHINORRHEA: 0
NAIL CHANGES: 0
COUGH: 0

## 2022-07-06 NOTE — PROGRESS NOTES
[address] [place] [address]  Dept: [phone]  Dept Fax: [phone]    Julee Lockwood is a 33 y.o. female who presents to the urgent care today for her medical conditions/complaints as notedbelow. Julee Lockwood is c/o of Insect Bite (onset since Friday, right lower ankle )      HPI:     Rash  This is a new problem. The current episode started in the past 7 days (about 5 days ago patient was stung by a wasp). The problem has been gradually worsening since onset. The affected locations include the right ankle. The rash is characterized by swelling, redness and blistering. Associated with: wasp sting. Pertinent negatives include no anorexia, congestion, cough, diarrhea, eye pain, facial edema, fatigue, fever, joint pain, nail changes, rhinorrhea, shortness of breath or sore throat. Past treatments include antibiotics, antihistamine and anti-itch cream (allegra, benadryl and tylenol for pain). The treatment provided no relief. There is no history of allergies, asthma, eczema or varicella. Past Medical History:   Diagnosis Date    COVID-19 (7/22/2020) 8/10/2020    Dysmenorrhea     Intrahepatic cholestasis of pregnancy (G1) 7/22/2021    Menorrhagia         Current Outpatient Medications   Medication Sig Dispense Refill    predniSONE (DELTASONE) 20 MG tablet Take 2 tablets by mouth daily for 5 days 10 tablet 0    sulfamethoxazole-trimethoprim (BACTRIM DS) 800-160 MG per tablet Take 1 tablet by mouth 2 times daily for 7 days 14 tablet 0    mupirocin (BACTROBAN) 2 % ointment Apply topically 3 times daily.  30 g 1    Prenatal Vit-Fe Fumarate-FA (PRENATAL 1+1 PO) Take by mouth      BALCOLTRA 0.1-20 MG-MCG(21) TABS TAKE 1 TABLET DAILY 84 tablet 0    sertraline (ZOLOFT) 50 MG tablet Take 1 tablet by mouth daily 90 tablet 1    vitamin D3 (CHOLECALCIFEROL) 25 MCG (1000 UT) TABS tablet Take 1 tablet by mouth daily 90 tablet 1    ferrous sulfate (IRON 325) 325 (65 Fe) MG tablet Take 1 tablet by mouth daily (with breakfast) (Patient not taking: Reported on 7/6/2022) 90 tablet 1     No current facility-administered medications for this visit. Allergies   Allergen Reactions    Amoxil [Amoxicillin] Swelling     Reported throat swelling        Subjective:      Review of Systems   Constitutional: Negative for fatigue and fever. HENT: Negative for congestion, rhinorrhea and sore throat. Eyes: Negative for pain and discharge. Respiratory: Negative for cough and shortness of breath. Gastrointestinal: Negative for anorexia and diarrhea. Genitourinary: Negative for difficulty urinating and dysuria. Musculoskeletal: Negative for joint pain. Skin: Positive for rash and wound. Negative for nail changes. Neurological: Negative for dizziness and headaches. All other systems reviewed and are negative. 14 systems reviewed and negative except as listed in HPI. Objective:     Physical Exam  Vitals reviewed. Constitutional:       General: She is not in acute distress. Appearance: Normal appearance. She is not ill-appearing, toxic-appearing or diaphoretic. HENT:      Head: Normocephalic. Nose: Nose normal. No congestion. Eyes:      General:         Left eye: No discharge. Conjunctiva/sclera: Conjunctivae normal.   Cardiovascular:      Pulses:           Dorsalis pedis pulses are 2+ on the right side. Posterior tibial pulses are 2+ on the right side. Pulmonary:      Effort: Pulmonary effort is normal.   Musculoskeletal:      Cervical back: Normal range of motion and neck supple. Right ankle: Swelling present. No ecchymosis. Tenderness present. Normal pulse. Right Achilles Tendon: Normal.      Left ankle: Normal.      Left Achilles Tendon: Normal.      Right foot: Normal capillary refill. Swelling present. No tenderness or bony tenderness. Normal pulse. Left foot: Normal.   Lymphadenopathy:      Cervical: No cervical adenopathy. Skin:     General: Skin is warm and dry. Capillary Refill: Capillary refill takes less than 2 seconds. Coloration: Skin is not mottled, pale or sallow. Findings: Ecchymosis, erythema, rash and wound present. No abrasion, abscess or petechiae. Rash is macular and papular. Rash is not crusting, pustular, scaling, urticarial or vesicular. Neurological:      Mental Status: She is alert and oriented to person, place, and time. Motor: No weakness. Coordination: Coordination normal.      Gait: Gait normal.   Psychiatric:         Mood and Affect: Mood normal.         Behavior: Behavior normal.         Thought Content: Thought content normal.         Judgment: Judgment normal.       /81 (Site: Left Upper Arm, Position: Sitting, Cuff Size: Medium Adult)   Pulse 78   Temp 98.1 °F (36.7 °C) (Infrared)   SpO2 98%     Assessment:       Diagnosis Orders   1. Skin infection  sulfamethoxazole-trimethoprim (BACTRIM DS) 800-160 MG per tablet    mupirocin (BACTROBAN) 2 % ointment   2. Wasp sting, accidental or unintentional, initial encounter  mupirocin (BACTROBAN) 2 % ointment   3. Swelling  predniSONE (DELTASONE) 20 MG tablet       Plan:   -Patient instructed to complete antibiotic prescription fully.  -Warm compresses TID for 15 minutes at a time.  -Prednisone to help alleviate swelling and discomfort  -bactroban to be applied topically  -continue benadryl as needed  -Tylenol/Motrin as needed for the discomfort/fever.  -Patient agreeable to treatment plan.  -Educational materials provided on AVS.  -Follow up if symptoms do not improve/worsen. Return if symptoms worsen or fail to improve.     Orders Placed This Encounter   Medications    predniSONE (DELTASONE) 20 MG tablet     Sig: Take 2 tablets by mouth daily for 5 days     Dispense:  10 tablet     Refill:  0    sulfamethoxazole-trimethoprim (BACTRIM DS) 800-160 MG per tablet     Sig: Take 1 tablet by mouth 2 times daily for 7 days     Dispense:  14 tablet     Refill:  0    mupirocin (BACTROBAN) 2 % ointment     Sig: Apply topically 3 times daily. Dispense:  30 g     Refill:  1         Patient given educational materials - see patient instructions. Discussed use, benefit, and side effects of prescribed medications. All patient questions answered. Pt voiced understanding.     Electronically signed by TERRENCE Barraza NP on 7/6/2022 at 1:10 PM

## 2022-07-06 NOTE — PATIENT INSTRUCTIONS
Patient Education        Cellulitis: Care Instructions  Your Care Instructions     Cellulitis is a skin infection caused by bacteria, most often strep or staph. It often occurs after a break in the skin from a scrape, cut, bite, orpuncture, or after a rash. Cellulitis may be treated without doing tests to find out what caused it. But your doctor may do tests, if needed, to look for a specific bacteria, like methicillin-resistant Staphylococcus aureus (MRSA). The doctor has checked you carefully, but problems can develop later. If you notice any problems or new symptoms, get medical treatment right away. Follow-up care is a key part of your treatment and safety. Be sure to make and go to all appointments, and call your doctor if you are having problems. It's also a good idea to know your test results and keep alist of the medicines you take. How can you care for yourself at home?  Take your antibiotics as directed. Do not stop taking them just because you feel better. You need to take the full course of antibiotics.  Prop up the infected area on pillows to reduce pain and swelling. Try to keep the area above the level of your heart as often as you can.  If your doctor told you how to care for your wound, follow your doctor's instructions. If you did not get instructions, follow this general advice:  ? Wash the wound with clean water 2 times a day. Don't use hydrogen peroxide or alcohol, which can slow healing. ? You may cover the wound with a thin layer of petroleum jelly, such as Vaseline, and a nonstick bandage. ? Apply more petroleum jelly and replace the bandage as needed.  Be safe with medicines. Take pain medicines exactly as directed. ? If the doctor gave you a prescription medicine for pain, take it as prescribed. ? If you are not taking a prescription pain medicine, ask your doctor if you can take an over-the-counter medicine.   To prevent cellulitis in the future   Try to prevent cuts, scrapes, or other injuries to your skin. Cellulitis most often occurs where there is a break in the skin.  If you get a scrape, cut, mild burn, or bite, wash the wound with clean water as soon as you can to help avoid infection. Don't use hydrogen peroxide or alcohol, which can slow healing.  If you have swelling in your legs (edema), support stockings and good skin care may help prevent leg sores and cellulitis.  Take care of your feet, especially if you have diabetes or other conditions that increase the risk of infection. Wear shoes and socks. Do not go barefoot. If you have athlete's foot or other skin problems on your feet, talk to your doctor about how to treat them. When should you call for help? Call your doctor now or seek immediate medical care if:     You have signs that your infection is getting worse, such as:  ? Increased pain, swelling, warmth, or redness. ? Red streaks leading from the area. ? Pus draining from the area. ? A fever.      You get a rash. Watch closely for changes in your health, and be sure to contact your doctor if:     You do not get better as expected. Where can you learn more? Go to https://[employer]. org and sign in to your [employer] account. Enter B780 in the [facility] box to learn more about \"Cellulitis: Care Instructions. \"     If you do not have an account, please click on the \"Sign Up Now\" link. Current as of: November 15, 2021               Content Version: 13.3  © 2006-2022 Healthwise, Incorporated. Care instructions adapted under license by [facility] ([facility]). If you have questions about a medical condition or this instruction, always ask your healthcare professional. [place] [address] any warranty or liability for your use of this information.

## 2022-09-05 ENCOUNTER — PATIENT MESSAGE (OUTPATIENT)
Dept: OBGYN CLINIC | Age: 27
End: 2022-09-05

## 2022-09-06 RX ORDER — LEVONORGESTREL AND ETHINYL ESTRADIOL 0.1-0.02MG
KIT ORAL
Qty: 84 TABLET | Refills: 1 | Status: SHIPPED | OUTPATIENT
Start: 2022-09-06

## 2022-09-06 NOTE — TELEPHONE ENCOUNTER
From: Vicki Pack  To: Dr. Siva Patino: 9/5/2022 5:25 PM EDT  Subject: Samples    Hi just wondering if I am able to get any more balcoltra samples. Thanks!

## 2022-09-06 NOTE — TELEPHONE ENCOUNTER
[facility] can [facility] cheaper due to no insurance at this time.

## 2022-09-07 NOTE — TELEPHONE ENCOUNTER
[facility] as no insurance at this time- asking what you recommend switching too

## 2022-09-14 ENCOUNTER — PATIENT MESSAGE (OUTPATIENT)
Dept: OBGYN CLINIC | Age: 27
End: 2022-09-14

## 2022-09-14 NOTE — TELEPHONE ENCOUNTER
Does not have insurance- Ancil Cola is 120 $ from [facility]. She found [employer] coupon for a 3 month supply of low-ogestrel for 20 bucks. David Caldwell are you able to sent [facility]?

## 2022-09-15 RX ORDER — NORGESTREL AND ETHINYL ESTRADIOL 0.3-0.03MG
1 KIT ORAL DAILY
Qty: 1 PACKET | Refills: 3 | Status: SHIPPED | OUTPATIENT
Start: 2022-09-15

## 2022-09-19 NOTE — TELEPHONE ENCOUNTER
Please provide low estrogen OCP samples that do have. [facility]lo for example or place prescription for loestrin as I think this should be more affordable.   Thx.

## 2023-01-09 RX ORDER — NORGESTREL AND ETHINYL ESTRADIOL 0.3-0.03MG
KIT ORAL
Qty: 28 TABLET | Refills: 3 | Status: SHIPPED | OUTPATIENT
Start: 2023-01-09

## 2023-03-15 RX ORDER — NORGESTREL AND ETHINYL ESTRADIOL 0.3-0.03MG
KIT ORAL
Qty: 28 TABLET | Refills: 5 | Status: SHIPPED | OUTPATIENT
Start: 2023-03-15

## 2023-07-07 ENCOUNTER — OFFICE VISIT (OUTPATIENT)
Dept: OBGYN CLINIC | Age: 28
End: 2023-07-07
Payer: COMMERCIAL

## 2023-07-07 ENCOUNTER — HOSPITAL ENCOUNTER (OUTPATIENT)
Age: 28
Setting detail: SPECIMEN
Discharge: HOME | End: 2023-07-07

## 2023-07-07 VITALS
BODY MASS INDEX: 26.62 KG/M2 | DIASTOLIC BLOOD PRESSURE: 68 MMHG | SYSTOLIC BLOOD PRESSURE: 120 MMHG | WEIGHT: 141 LBS | HEIGHT: 61 IN

## 2023-07-07 DIAGNOSIS — Z01.419 ENCOUNTER FOR ANNUAL ROUTINE GYNECOLOGICAL EXAMINATION: Primary | ICD-10-CM

## 2023-07-07 PROCEDURE — 3074F SYST BP LT 130 MM HG: CPT | Performed by: OBSTETRICS & GYNECOLOGY

## 2023-07-07 PROCEDURE — 99395 PREV VISIT EST AGE 18-39: CPT | Performed by: OBSTETRICS & GYNECOLOGY

## 2023-07-07 PROCEDURE — 3078F DIAST BP <80 MM HG: CPT | Performed by: OBSTETRICS & GYNECOLOGY

## 2023-07-07 RX ORDER — NORGESTREL AND ETHINYL ESTRADIOL 0.3-0.03MG
KIT ORAL
Qty: 28 TABLET | Refills: 11 | Status: SHIPPED | OUTPATIENT
Start: 2023-07-07

## 2023-07-07 NOTE — PROGRESS NOTES
Hypertension Father     Diabetes Father         borderline    Cancer Paternal Aunt 61        Brain Ca    Arthritis Maternal Grandmother     Osteoporosis Maternal Grandmother     Osteoarthritis Maternal Grandmother     Deep Vein Thrombosis Maternal Grandfather     Heart Disease Paternal Grandfather     High Blood Pressure Paternal Grandfather      Social History     Socioeconomic History    Marital status: Single     Spouse name: Not on file    Number of children: Not on file    Years of education: Not on file    Highest education level: Not on file   Occupational History    Not on file   Tobacco Use    Smoking status: Never    Smokeless tobacco: Never   Vaping Use    Vaping Use: Never used   Substance and Sexual Activity    Alcohol use: Not Currently     Comment: once a month    Drug use: No    Sexual activity: Yes     Partners: Male   Other Topics Concern    Not on file   Social History Narrative    Not on file     Social Determinants of Health     Financial Resource Strain: Low Risk     Difficulty of Paying Living Expenses: Not hard at all   Food Insecurity: No Food Insecurity    Worried About Running Out of Food in the Last Year: Never true    [address] in the Last Year: Never true   Transportation Needs: No Transportation Needs    Lack of Transportation (Medical): No    Lack of Transportation (Non-Medical):  No   Physical Activity: Not on file   Stress: Not on file   Social Connections: Not on file   Intimate Partner Violence: Not on file   Housing Stability: Unknown    Unable to Pay for Housing in the Last Year: Not on file    Number of Places Lived in the Last Year: Not on file    Unstable Housing in the Last Year: No       MEDICATIONS:  Current Outpatient Medications   Medication Sig Dispense Refill    norgestrel-ethinyl estradiol (ELINEST) 0.3-30 MG-MCG per tablet take 1 tablet by mouth once daily 28 tablet 11    buPROPion (WELLBUTRIN XL) 300 MG extended release tablet Take 1 tablet by mouth every

## 2023-07-14 LAB — CYTOLOGY REPORT: NORMAL

## 2023-11-08 ENCOUNTER — OFFICE VISIT (OUTPATIENT)
Dept: FAMILY MEDICINE CLINIC | Age: 28
End: 2023-11-08
Payer: COMMERCIAL

## 2023-11-08 ENCOUNTER — HOSPITAL ENCOUNTER (OUTPATIENT)
Age: 28
Discharge: HOME | End: 2023-11-08
Payer: COMMERCIAL

## 2023-11-08 VITALS
OXYGEN SATURATION: 100 % | SYSTOLIC BLOOD PRESSURE: 128 MMHG | TEMPERATURE: 97.5 F | DIASTOLIC BLOOD PRESSURE: 82 MMHG | HEART RATE: 90 BPM

## 2023-11-08 DIAGNOSIS — B96.89 ACUTE BACTERIAL SINUSITIS: Primary | ICD-10-CM

## 2023-11-08 DIAGNOSIS — E55.9 VITAMIN D DEFICIENCY: ICD-10-CM

## 2023-11-08 DIAGNOSIS — J40 BRONCHITIS: ICD-10-CM

## 2023-11-08 DIAGNOSIS — Z13.220 SCREENING FOR HYPERLIPIDEMIA: ICD-10-CM

## 2023-11-08 DIAGNOSIS — R73.03 PRE-DIABETES: ICD-10-CM

## 2023-11-08 DIAGNOSIS — J01.90 ACUTE BACTERIAL SINUSITIS: Primary | ICD-10-CM

## 2023-11-08 LAB
ALBUMIN SERPL-MCNC: 4.2 G/DL (ref 3.5–5.2)
ALP SERPL-CCNC: 75 U/L (ref 35–104)
ALT SERPL-CCNC: 19 U/L (ref 5–33)
ANION GAP SERPL CALCULATED.3IONS-SCNC: 9 MMOL/L (ref 9–17)
AST SERPL-CCNC: 12 U/L
BASOPHILS # BLD: 0.1 K/UL (ref 0–0.2)
BASOPHILS NFR BLD: 1 % (ref 0–2)
BILIRUB SERPL-MCNC: 0.5 MG/DL (ref 0.3–1.2)
BUN SERPL-MCNC: 12 MG/DL (ref 6–20)
CALCIUM SERPL-MCNC: 8.7 MG/DL (ref 8.6–10.4)
CHLORIDE SERPL-SCNC: 105 MMOL/L (ref 98–107)
CHOLEST SERPL-MCNC: 176 MG/DL
CHOLESTEROL/HDL RATIO: 3.2
CO2 SERPL-SCNC: 28 MMOL/L (ref 20–31)
CREAT SERPL-MCNC: 0.8 MG/DL (ref 0.5–0.9)
EOSINOPHIL # BLD: 0.2 K/UL (ref 0–0.4)
EOSINOPHILS RELATIVE PERCENT: 2 % (ref 0–4)
ERYTHROCYTE [DISTWIDTH] IN BLOOD BY AUTOMATED COUNT: 13.8 % (ref 11.5–14.9)
EST. AVERAGE GLUCOSE BLD GHB EST-MCNC: 97 MG/DL
GFR SERPL CREATININE-BSD FRML MDRD: >60 ML/MIN/1.73M2
GLUCOSE SERPL-MCNC: 91 MG/DL (ref 70–99)
HBA1C MFR BLD: 5 % (ref 4–6)
HCT VFR BLD AUTO: 43.1 % (ref 36–46)
HDLC SERPL-MCNC: 55 MG/DL
HGB BLD-MCNC: 14.1 G/DL (ref 12–16)
LDLC SERPL CALC-MCNC: 106 MG/DL (ref 0–130)
LYMPHOCYTES NFR BLD: 2.3 K/UL (ref 1–4.8)
LYMPHOCYTES RELATIVE PERCENT: 29 % (ref 24–44)
MCH RBC QN AUTO: 29.5 PG (ref 26–34)
MCHC RBC AUTO-ENTMCNC: 32.8 G/DL (ref 31–37)
MCV RBC AUTO: 90 FL (ref 80–100)
MONOCYTES NFR BLD: 0.5 K/UL (ref 0.1–1.3)
MONOCYTES NFR BLD: 7 % (ref 1–7)
NEUTROPHILS NFR BLD: 61 % (ref 36–66)
NEUTS SEG NFR BLD: 4.9 K/UL (ref 1.3–9.1)
PLATELET # BLD AUTO: 325 K/UL (ref 150–450)
PMV BLD AUTO: 8.3 FL (ref 6–12)
POTASSIUM SERPL-SCNC: 3.8 MMOL/L (ref 3.7–5.3)
PROT SERPL-MCNC: 6.8 G/DL (ref 6.4–8.3)
RBC # BLD AUTO: 4.79 M/UL (ref 4–5.2)
SODIUM SERPL-SCNC: 142 MMOL/L (ref 135–144)
TRIGL SERPL-MCNC: 74 MG/DL
WBC OTHER # BLD: 7.9 K/UL (ref 3.5–11)

## 2023-11-08 PROCEDURE — 85025 COMPLETE CBC W/AUTO DIFF WBC: CPT

## 2023-11-08 PROCEDURE — 1036F TOBACCO NON-USER: CPT | Performed by: NURSE PRACTITIONER

## 2023-11-08 PROCEDURE — G8427 DOCREV CUR MEDS BY ELIG CLIN: HCPCS | Performed by: NURSE PRACTITIONER

## 2023-11-08 PROCEDURE — 99213 OFFICE O/P EST LOW 20 MIN: CPT | Performed by: NURSE PRACTITIONER

## 2023-11-08 PROCEDURE — 82306 VITAMIN D 25 HYDROXY: CPT

## 2023-11-08 PROCEDURE — 3079F DIAST BP 80-89 MM HG: CPT | Performed by: NURSE PRACTITIONER

## 2023-11-08 PROCEDURE — 36415 COLL VENOUS BLD VENIPUNCTURE: CPT

## 2023-11-08 PROCEDURE — 80061 LIPID PANEL: CPT

## 2023-11-08 PROCEDURE — G8484 FLU IMMUNIZE NO ADMIN: HCPCS | Performed by: NURSE PRACTITIONER

## 2023-11-08 PROCEDURE — 80053 COMPREHEN METABOLIC PANEL: CPT

## 2023-11-08 PROCEDURE — G8419 CALC BMI OUT NRM PARAM NOF/U: HCPCS | Performed by: NURSE PRACTITIONER

## 2023-11-08 PROCEDURE — 3074F SYST BP LT 130 MM HG: CPT | Performed by: NURSE PRACTITIONER

## 2023-11-08 PROCEDURE — 83036 HEMOGLOBIN GLYCOSYLATED A1C: CPT

## 2023-11-08 RX ORDER — PREDNISONE 20 MG/1
20 TABLET ORAL 2 TIMES DAILY
Qty: 10 TABLET | Refills: 0 | Status: SHIPPED | OUTPATIENT
Start: 2023-11-08 | End: 2023-11-10

## 2023-11-08 RX ORDER — BENZONATATE 100 MG/1
200 CAPSULE ORAL 3 TIMES DAILY PRN
Qty: 21 CAPSULE | Refills: 0 | Status: SHIPPED | OUTPATIENT
Start: 2023-11-08 | End: 2023-11-15

## 2023-11-08 RX ORDER — CETIRIZINE HYDROCHLORIDE 10 MG/1
10 TABLET ORAL DAILY
Qty: 30 TABLET | Refills: 0 | Status: SHIPPED | OUTPATIENT
Start: 2023-11-08 | End: 2023-12-08

## 2023-11-08 RX ORDER — ALBUTEROL SULFATE 90 UG/1
2 AEROSOL, METERED RESPIRATORY (INHALATION) EVERY 4 HOURS PRN
Qty: 18 G | Refills: 3 | Status: SHIPPED | OUTPATIENT
Start: 2023-11-08

## 2023-11-08 RX ORDER — AZITHROMYCIN 250 MG/1
250 TABLET, FILM COATED ORAL SEE ADMIN INSTRUCTIONS
Qty: 6 TABLET | Refills: 0 | Status: SHIPPED | OUTPATIENT
Start: 2023-11-08 | End: 2023-11-13

## 2023-11-08 ASSESSMENT — ENCOUNTER SYMPTOMS
CHEST TIGHTNESS: 0
CHOKING: 0
SORE THROAT: 0
STRIDOR: 0
SHORTNESS OF BREATH: 0
RHINORRHEA: 1
WHEEZING: 1
COUGH: 1

## 2023-11-08 NOTE — PROGRESS NOTES
sx 2 weeks, out of quarantine and isolation protocol, I agree, no covid testing warranted  Reviewed over-the-counter treatments for symptom management. Pt verbalized agreement and understanding of POC    Return for Make an Appt. with your Primary Care in 1 week. Orders Placed This Encounter   Medications    albuterol sulfate HFA (PROAIR HFA) 108 (90 Base) MCG/ACT inhaler     Sig: Inhale 2 puffs into the lungs every 4 hours as needed for Wheezing     Dispense:  18 g     Refill:  3    predniSONE (DELTASONE) 20 MG tablet     Sig: Take 1 tablet by mouth 2 times daily for 5 days     Dispense:  10 tablet     Refill:  0    azithromycin (ZITHROMAX) 250 MG tablet     Sig: Take 1 tablet by mouth See Admin Instructions for 5 days 500mg on day 1 followed by 250mg on days 2 - 5     Dispense:  6 tablet     Refill:  0    benzonatate (TESSALON PERLES) 100 MG capsule     Sig: Take 2 capsules by mouth 3 times daily as needed for Cough     Dispense:  21 capsule     Refill:  0    cetirizine (ZYRTEC) 10 MG tablet     Sig: Take 1 tablet by mouth daily     Dispense:  30 tablet     Refill:  0         Patient given educational materials - see patient instructions. Discussed use, benefit, and side effects of prescribed medications. All patient questions answered. Pt voicedunderstanding.     Electronically signed by TERRENCE Brooks CNP on 11/8/2023 at 9:10 AM

## 2023-11-09 LAB — 25(OH)D3 SERPL-MCNC: 24.9 NG/ML

## 2024-04-25 ENCOUNTER — OFFICE VISIT (OUTPATIENT)
Dept: FAMILY MEDICINE CLINIC | Age: 29
End: 2024-04-25
Payer: COMMERCIAL

## 2024-04-25 VITALS
OXYGEN SATURATION: 98 % | HEART RATE: 100 BPM | DIASTOLIC BLOOD PRESSURE: 89 MMHG | TEMPERATURE: 98 F | SYSTOLIC BLOOD PRESSURE: 139 MMHG

## 2024-04-25 DIAGNOSIS — R05.9 COUGH, UNSPECIFIED TYPE: ICD-10-CM

## 2024-04-25 DIAGNOSIS — J01.90 ACUTE BACTERIAL SINUSITIS: Primary | ICD-10-CM

## 2024-04-25 DIAGNOSIS — B96.89 ACUTE BACTERIAL SINUSITIS: Primary | ICD-10-CM

## 2024-04-25 LAB
INFLUENZA A ANTIGEN, POC: NEGATIVE
INFLUENZA B ANTIGEN, POC: NEGATIVE
LOT EXPIRE DATE: NORMAL
LOT KIT NUMBER: NORMAL
SARS-COV-2, POC: NORMAL
VALID INTERNAL CONTROL: NORMAL
VENDOR AND KIT NAME POC: NORMAL

## 2024-04-25 PROCEDURE — G8427 DOCREV CUR MEDS BY ELIG CLIN: HCPCS | Performed by: NURSE PRACTITIONER

## 2024-04-25 PROCEDURE — 3075F SYST BP GE 130 - 139MM HG: CPT | Performed by: NURSE PRACTITIONER

## 2024-04-25 PROCEDURE — 1036F TOBACCO NON-USER: CPT | Performed by: NURSE PRACTITIONER

## 2024-04-25 PROCEDURE — 3079F DIAST BP 80-89 MM HG: CPT | Performed by: NURSE PRACTITIONER

## 2024-04-25 PROCEDURE — 99213 OFFICE O/P EST LOW 20 MIN: CPT | Performed by: NURSE PRACTITIONER

## 2024-04-25 PROCEDURE — 87428 SARSCOV & INF VIR A&B AG IA: CPT | Performed by: NURSE PRACTITIONER

## 2024-04-25 PROCEDURE — G8420 CALC BMI NORM PARAMETERS: HCPCS | Performed by: NURSE PRACTITIONER

## 2024-04-25 RX ORDER — AZITHROMYCIN 250 MG/1
TABLET, FILM COATED ORAL
Qty: 6 TABLET | Refills: 0 | Status: SHIPPED | OUTPATIENT
Start: 2024-04-25 | End: 2024-05-05

## 2024-04-25 ASSESSMENT — ENCOUNTER SYMPTOMS
CHOKING: 0
WHEEZING: 0
HEMOPTYSIS: 0
SORE THROAT: 0
RHINORRHEA: 1
STRIDOR: 0
COUGH: 1
HEARTBURN: 0
CHEST TIGHTNESS: 0
APNEA: 0
SHORTNESS OF BREATH: 0

## 2024-04-25 NOTE — PROGRESS NOTES
eye: No discharge.         Left eye: No discharge.      Conjunctiva/sclera: Conjunctivae normal.      Pupils: Pupils are equal, round, and reactive to light.   Cardiovascular:      Rate and Rhythm: Normal rate.      Pulses: Normal pulses.      Heart sounds: Normal heart sounds. No murmur heard.     No friction rub. No gallop.   Pulmonary:      Effort: Pulmonary effort is normal. No respiratory distress.      Breath sounds: Normal breath sounds. No stridor. No wheezing, rhonchi or rales.   Chest:      Chest wall: No tenderness.   Abdominal:      Tenderness: There is no abdominal tenderness.   Musculoskeletal:         General: No signs of injury.      Cervical back: Neck supple.      Comments: Ambulated to and from room, gait steady, moving all ext without difficulty   Skin:     General: Skin is warm and dry.      Capillary Refill: Capillary refill takes less than 2 seconds.      Findings: No rash ( no rash to visible skin).   Neurological:      General: No focal deficit present.      Mental Status: She is alert and oriented to person, place, and time.   Psychiatric:         Mood and Affect: Mood normal.         Behavior: Behavior normal.       /89 (Site: Right Upper Arm, Position: Sitting, Cuff Size: Medium Adult)   Pulse 100   Temp 98 °F (36.7 °C) (Infrared)   SpO2 98%     Assessment:   Assessment & Plan    Diagnosis Orders   1. Acute bacterial sinusitis        2. Cough, unspecified type  POCT COVID-19 & Influenza A/B          Plan:      POCT flu a neg  POCT flu b neg  POCT covid neg  Nontoxic, vss, ls clr t/o, moderate nasal congestion on exam, thick yellow pnd  Zpak rx  Mucinex otc  Cont flonase and xyzal  Reviewed over-the-counter treatments for symptom management.  Return worse  Pt verbalized agreement and understanding of POC    Return for Make an Appt. with your Primary Care in 1 week.    Orders Placed This Encounter   Medications    azithromycin (ZITHROMAX) 250 MG tablet     Simg on day 1

## 2024-05-10 ENCOUNTER — HOSPITAL ENCOUNTER (OUTPATIENT)
Age: 29
Setting detail: SPECIMEN
Discharge: HOME | End: 2024-05-10

## 2024-05-10 DIAGNOSIS — Z02.0 SCHOOL PHYSICAL EXAM: ICD-10-CM

## 2024-05-10 DIAGNOSIS — R53.83 OTHER FATIGUE: ICD-10-CM

## 2024-05-10 DIAGNOSIS — E55.9 VITAMIN D DEFICIENCY: ICD-10-CM

## 2024-05-10 LAB
25(OH)D3 SERPL-MCNC: 30.7 NG/ML (ref 30–100)
FOLATE SERPL-MCNC: >20 NG/ML (ref 4.8–24.2)
HBV SURFACE AB SERPL IA-ACNC: >1000 MIU/ML
HCT VFR BLD AUTO: 46.9 % (ref 36.3–47.1)
HGB BLD-MCNC: 15.1 G/DL (ref 11.9–15.1)
RUBV IGG SERPL QL IA: 144 IU/ML
VIT B12 SERPL-MCNC: 462 PG/ML (ref 232–1245)

## 2024-05-13 LAB
MEV IGG SER-ACNC: 2.46
MUV IGG SER IA-ACNC: 2.38
VZV IGG SER QL IA: 2.08

## 2024-06-14 ENCOUNTER — OFFICE VISIT (OUTPATIENT)
Dept: FAMILY MEDICINE CLINIC | Age: 29
End: 2024-06-14

## 2024-06-14 VITALS
HEART RATE: 85 BPM | OXYGEN SATURATION: 99 % | TEMPERATURE: 98.2 F | DIASTOLIC BLOOD PRESSURE: 83 MMHG | SYSTOLIC BLOOD PRESSURE: 128 MMHG
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
## 2024-06-14 RX ORDER — TRIAMCINOLONE ACETONIDE 1 MG/G
OINTMENT TOPICAL
Qty: 30 G | Refills: 0 | Status: SHIPPED | OUTPATIENT
Start: 2024-06-14

## 2024-06-14 RX ORDER — METHYLPREDNISOLONE 4 MG/1
TABLET ORAL
Qty: 1 KIT | Refills: 0 | Status: SHIPPED | OUTPATIENT
Start: 2024-06-14 | End: 2024-06-20

## 2024-06-14 ASSESSMENT — ENCOUNTER SYMPTOMS: COLOR CHANGE: 1

## 2024-06-14 NOTE — PROGRESS NOTES
[facility]  [address] [facility] [address]    [facility]  [address]  [address]  [facility] [address]  Dept: [phone]    Pierre Vallejo is a 29 y.o. female Established patient, who presents to the walk-in clinic today with conditions/complaints as noted below:    Chief Complaint   Patient presents with    Rash     Onset 3 days patient states she is having a reaction to mosquito bites, using OTC with no relief          HPI:     Patient is a 29-year-old female that presents today for evaluation of mosquito bites. They've been present for three days without improvement. She has them on her right arm, left lower leg, and upper back. Endorses red appearance. Reports associated pain/discomfort and itching. Notes that they've become more full. Denies bleeding or discharge. No fevers or other systemic symptoms. She's been taking Benadryl and Claritin without improvement and applying Hydrocortisone cream as well.        Past Medical History:   Diagnosis Date    COVID-19 (7/22/2020) 8/10/2020    Dysmenorrhea     Intrahepatic cholestasis of pregnancy (G1) 7/22/2021    Menorrhagia        Current Outpatient Medications   Medication Sig Dispense Refill    methylPREDNISolone (MEDROL DOSEPACK) 4 MG tablet Take by mouth. 1 kit 0    triamcinolone (KENALOG) 0.1 % ointment Apply topically 2 times daily. 30 g 0    Prenatal Vit-Fe Fumarate-FA (PRENATAL VITAMIN PO) Take by mouth      Lactobacillus (PROBIOTIC ACIDOPHILUS PO) Take by mouth      buPROPion (WELLBUTRIN XL) 300 MG extended release tablet Take 1 tablet by mouth every morning 90 tablet 3    albuterol sulfate HFA (PROAIR HFA) 108 (90 Base) MCG/ACT inhaler Inhale 2 puffs into the lungs every 4 hours as needed for Wheezing 18 g 3    norgestrel-ethinyl estradiol (ELINEST) 0.3-30 MG-MCG per tablet

## 2024-06-14 NOTE — PATIENT INSTRUCTIONS
Complete the entire course of oral steroid taper.  May apply topical steroid ointment twice daily.  Continue with daily antihistamine.  Follow-up as needed.

## 2024-07-18 NOTE — TELEPHONE ENCOUNTER
Called pt to RS her 7/23 appt. She has no issues or concerns at this time but does need refill to get her through until that appt. Meds pending.

## 2024-07-19 RX ORDER — NORGESTREL AND ETHINYL ESTRADIOL 0.3-0.03MG
KIT ORAL
Qty: 28 TABLET | Refills: 2 | Status: SHIPPED | OUTPATIENT
Start: 2024-07-19

## 2024-08-20 ENCOUNTER — OFFICE VISIT (OUTPATIENT)
Dept: FAMILY MEDICINE CLINIC | Age: 29
End: 2024-08-20
Payer: COMMERCIAL

## 2024-08-20 VITALS
BODY MASS INDEX: 24.54 KG/M2 | DIASTOLIC BLOOD PRESSURE: 88 MMHG | TEMPERATURE: 98.1 F | OXYGEN SATURATION: 99 % | HEIGHT: 60 IN | SYSTOLIC BLOOD PRESSURE: 140 MMHG | WEIGHT: 125 LBS | HEART RATE: 99 BPM

## 2024-08-20 DIAGNOSIS — R05.1 ACUTE COUGH: ICD-10-CM

## 2024-08-20 DIAGNOSIS — J02.9 SORE THROAT: ICD-10-CM

## 2024-08-20 DIAGNOSIS — Z01.419 ENCOUNTER FOR ANNUAL ROUTINE GYNECOLOGICAL EXAMINATION: Primary | ICD-10-CM

## 2024-08-20 DIAGNOSIS — H66.001 NON-RECURRENT ACUTE SUPPURATIVE OTITIS MEDIA OF RIGHT EAR WITHOUT SPONTANEOUS RUPTURE OF TYMPANIC MEMBRANE: Primary | ICD-10-CM

## 2024-08-20 LAB — S PYO AG THROAT QL: NORMAL

## 2024-08-20 PROCEDURE — 3079F DIAST BP 80-89 MM HG: CPT | Performed by: NURSE PRACTITIONER

## 2024-08-20 PROCEDURE — 3077F SYST BP >= 140 MM HG: CPT | Performed by: NURSE PRACTITIONER

## 2024-08-20 PROCEDURE — G8427 DOCREV CUR MEDS BY ELIG CLIN: HCPCS | Performed by: NURSE PRACTITIONER

## 2024-08-20 PROCEDURE — 87880 STREP A ASSAY W/OPTIC: CPT | Performed by: NURSE PRACTITIONER

## 2024-08-20 PROCEDURE — G8420 CALC BMI NORM PARAMETERS: HCPCS | Performed by: NURSE PRACTITIONER

## 2024-08-20 PROCEDURE — 1036F TOBACCO NON-USER: CPT | Performed by: NURSE PRACTITIONER

## 2024-08-20 PROCEDURE — 99213 OFFICE O/P EST LOW 20 MIN: CPT | Performed by: NURSE PRACTITIONER

## 2024-08-20 RX ORDER — LORATADINE 10 MG/1
10 TABLET ORAL DAILY
Qty: 30 TABLET | Refills: 0 | Status: SHIPPED | OUTPATIENT
Start: 2024-08-20 | End: 2024-09-19

## 2024-08-20 RX ORDER — DOXYCYCLINE HYCLATE 100 MG
100 TABLET ORAL 2 TIMES DAILY
Qty: 20 TABLET | Refills: 0 | Status: SHIPPED | OUTPATIENT
Start: 2024-08-20 | End: 2024-08-30

## 2024-08-20 RX ORDER — FLUTICASONE PROPIONATE 50 MCG
2 SPRAY, SUSPENSION (ML) NASAL DAILY
Qty: 16 G | Refills: 0 | Status: SHIPPED | OUTPATIENT
Start: 2024-08-20

## 2024-08-20 RX ORDER — ALBUTEROL SULFATE 90 UG/1
2 AEROSOL, METERED RESPIRATORY (INHALATION) EVERY 4 HOURS PRN
Qty: 18 G | Refills: 3 | Status: SHIPPED | OUTPATIENT
Start: 2024-08-20

## 2024-08-20 SDOH — ECONOMIC STABILITY: FOOD INSECURITY: WITHIN THE PAST 12 MONTHS, THE FOOD YOU BOUGHT JUST DIDN'T LAST AND YOU DIDN'T HAVE MONEY TO GET MORE.: NEVER TRUE

## 2024-08-20 SDOH — ECONOMIC STABILITY: FOOD INSECURITY: WITHIN THE PAST 12 MONTHS, YOU WORRIED THAT YOUR FOOD WOULD RUN OUT BEFORE YOU GOT MONEY TO BUY MORE.: NEVER TRUE

## 2024-08-20 SDOH — ECONOMIC STABILITY: INCOME INSECURITY: HOW HARD IS IT FOR YOU TO PAY FOR THE VERY BASICS LIKE FOOD, HOUSING, MEDICAL CARE, AND HEATING?: NOT HARD AT ALL

## 2024-08-20 ASSESSMENT — ENCOUNTER SYMPTOMS
CHOKING: 0
SORE THROAT: 1
COUGH: 1
SHORTNESS OF BREATH: 0
CHEST TIGHTNESS: 0
WHEEZING: 0
RHINORRHEA: 1
STRIDOR: 0

## 2024-08-20 NOTE — PROGRESS NOTES
ointment Apply topically 2 times daily. 30 g 0    Prenatal Vit-Fe Fumarate-FA (PRENATAL VITAMIN PO) Take by mouth      Lactobacillus (PROBIOTIC ACIDOPHILUS PO) Take by mouth      buPROPion (WELLBUTRIN XL) 300 MG extended release tablet Take 1 tablet by mouth every morning 90 tablet 3     No current facility-administered medications for this visit.     Allergies   Allergen Reactions    Amoxil [Amoxicillin] Swelling     Reported throat swelling     Penicillin G Sodium Other (See Comments)       Subjective:      Review of Systems   Constitutional:  Positive for diaphoresis and fatigue. Negative for activity change, appetite change, chills and fever.   HENT:  Positive for congestion, ear pain, hearing loss, postnasal drip, rhinorrhea and sore throat.    Respiratory:  Positive for cough. Negative for choking, chest tightness, shortness of breath, wheezing and stridor.    Cardiovascular: Negative.    Musculoskeletal:  Positive for myalgias.   Neurological:  Positive for headaches.   All other systems reviewed and are negative.    14 systems reviewed and negative except as listed in HPI.    Objective:     Physical Exam  Vitals and nursing note reviewed.   Constitutional:       General: She is not in acute distress.     Appearance: Normal appearance. She is not ill-appearing, toxic-appearing or diaphoretic.   HENT:      Head: Normocephalic and atraumatic.      Right Ear: Ear canal and external ear normal.      Left Ear: Tympanic membrane, ear canal and external ear normal.      Ears:      Comments: Rt tm bulging and injected     Nose: Congestion present. No rhinorrhea.      Comments: haily sinus tenderness  No facial swelling or erythema     Mouth/Throat:      Mouth: Mucous membranes are moist.      Pharynx: Posterior oropharyngeal erythema present. No oropharyngeal exudate.      Comments: + cobblestoning  Uvula midline no edema  Handling oral secretions without difficulty  Eyes:      General: No scleral icterus.        Right

## 2024-08-20 NOTE — TELEPHONE ENCOUNTER
Medication Request/Refill Telephone Documentation:  Medication Requested: ELINEST   Provider last filled by: ALEXANDRA  Last visit: 7/7/23  Last annual/pap: 7/7/23  NEXT APPT: 10/23/24

## 2024-10-14 DIAGNOSIS — Z01.419 ENCOUNTER FOR ANNUAL ROUTINE GYNECOLOGICAL EXAMINATION: ICD-10-CM

## 2024-10-14 NOTE — TELEPHONE ENCOUNTER
Pt needed to reschedule her appt. She is now scheduled for 1/10/25 for her annual appt with you. She asked if you could give her enough refills to get her through until her appt.

## 2024-12-11 DIAGNOSIS — Z01.419 ENCOUNTER FOR ANNUAL ROUTINE GYNECOLOGICAL EXAMINATION: ICD-10-CM

## 2024-12-11 NOTE — TELEPHONE ENCOUNTER
Called Pt on 12/11 10:23a to reschedule her January appt. She is now scheduled for 1/8 for her annual pap. She needs a refill on her BC to get her through until her appt. She is currently out of refills.

## 2025-01-08 ENCOUNTER — HOSPITAL ENCOUNTER (OUTPATIENT)
Age: 30
Setting detail: SPECIMEN
Discharge: HOME | End: 2025-01-08

## 2025-01-08 ENCOUNTER — OFFICE VISIT (OUTPATIENT)
Dept: OBGYN CLINIC | Age: 30
End: 2025-01-08
Payer: COMMERCIAL

## 2025-01-08 VITALS
WEIGHT: 122.5 LBS | DIASTOLIC BLOOD PRESSURE: 62 MMHG | SYSTOLIC BLOOD PRESSURE: 112 MMHG | HEIGHT: 60 IN | BODY MASS INDEX: 24.05 KG/M2

## 2025-01-08 DIAGNOSIS — Z01.419 PAP TEST, AS PART OF ROUTINE GYNECOLOGICAL EXAMINATION: Primary | ICD-10-CM

## 2025-01-08 PROCEDURE — 3078F DIAST BP <80 MM HG: CPT | Performed by: OBSTETRICS & GYNECOLOGY

## 2025-01-08 PROCEDURE — 99395 PREV VISIT EST AGE 18-39: CPT | Performed by: OBSTETRICS & GYNECOLOGY

## 2025-01-08 PROCEDURE — 3074F SYST BP LT 130 MM HG: CPT | Performed by: OBSTETRICS & GYNECOLOGY

## 2025-01-08 SDOH — ECONOMIC STABILITY: FOOD INSECURITY: WITHIN THE PAST 12 MONTHS, THE FOOD YOU BOUGHT JUST DIDN'T LAST AND YOU DIDN'T HAVE MONEY TO GET MORE.: NEVER TRUE

## 2025-01-08 SDOH — ECONOMIC STABILITY: FOOD INSECURITY: WITHIN THE PAST 12 MONTHS, YOU WORRIED THAT YOUR FOOD WOULD RUN OUT BEFORE YOU GOT MONEY TO BUY MORE.: NEVER TRUE

## 2025-01-08 ASSESSMENT — PATIENT HEALTH QUESTIONNAIRE - PHQ9
7. TROUBLE CONCENTRATING ON THINGS, SUCH AS READING THE NEWSPAPER OR WATCHING TELEVISION: NOT AT ALL
2. FEELING DOWN, DEPRESSED OR HOPELESS: NOT AT ALL
SUM OF ALL RESPONSES TO PHQ QUESTIONS 1-9: 0
SUM OF ALL RESPONSES TO PHQ QUESTIONS 1-9: 0
10. IF YOU CHECKED OFF ANY PROBLEMS, HOW DIFFICULT HAVE THESE PROBLEMS MADE IT FOR YOU TO DO YOUR WORK, TAKE CARE OF THINGS AT HOME, OR GET ALONG WITH OTHER PEOPLE: NOT DIFFICULT AT ALL
5. POOR APPETITE OR OVEREATING: NOT AT ALL
9. THOUGHTS THAT YOU WOULD BE BETTER OFF DEAD, OR OF HURTING YOURSELF: NOT AT ALL
6. FEELING BAD ABOUT YOURSELF - OR THAT YOU ARE A FAILURE OR HAVE LET YOURSELF OR YOUR FAMILY DOWN: NOT AT ALL
3. TROUBLE FALLING OR STAYING ASLEEP: NOT AT ALL
8. MOVING OR SPEAKING SO SLOWLY THAT OTHER PEOPLE COULD HAVE NOTICED. OR THE OPPOSITE, BEING SO FIGETY OR RESTLESS THAT YOU HAVE BEEN MOVING AROUND A LOT MORE THAN USUAL: NOT AT ALL
SUM OF ALL RESPONSES TO PHQ9 QUESTIONS 1 & 2: 0
4. FEELING TIRED OR HAVING LITTLE ENERGY: NOT AT ALL
SUM OF ALL RESPONSES TO PHQ QUESTIONS 1-9: 0
1. LITTLE INTEREST OR PLEASURE IN DOING THINGS: NOT AT ALL
SUM OF ALL RESPONSES TO PHQ QUESTIONS 1-9: 0

## 2025-01-08 NOTE — PROGRESS NOTES
History and Physical  [facility]    Pierre Vallejo  2024              29 y.o.  Chief Complaint   Patient presents with    Annual Exam       Patient's last menstrual period was 2024.             Primary Care Physician: Fiorella Guzman MD    The patient was seen and examined. She has no chief complaint today and is here for her annual exam.  Her bowels are regular. There are no voiding complaints. She denies any bloating.  She denies vaginal discharge and was counseled on STD's and the need for barrier contraception.     HPI : Pierre Vallejo is a 29 y.o. female     Pt. States she is doing well.  She is feeling well mentally and emotionally.     She had  21.  Taking OCP.  She does have premenstrual and menstrual headaches that she takes medication for PRN.  Discussed possible change in hormonal regulation if she is interested    She is concerned about decreased libido and discussed multifactorial but can be due to medication changes and possibly from Wellbutrin, but also encouraged to explore connection with partner and make sure that it is prioritized.    Declining cultures.  Bowel and bladder without dysfunction.    She has started school and is excited about this.    no Bloating  no Early Satiety  no Unexplained weight change of more than 15 lbs  no  PMB  no  PCB  ________________________________________________________________________  OB History    Para Term  AB Living   1 1 0 1 0 1   SAB IAB Ectopic Molar Multiple Live Births   0 0 0 0 0 1      # Outcome Date GA Lbr Von/2nd Weight Sex Type Anes PTL Lv   1  21 36w1d / 00:44 2.19 kg (4 lb 13.3 oz) M Vag-Spont EPI Y SELAM      Name: GRACIELA,BABY BOY PIERRE      Apgar1: 8  Apgar5: 9     Past Medical History:   Diagnosis Date    COVID-19 (2020) 8/10/2020    Dysmenorrhea     Intrahepatic cholestasis of pregnancy (G1) 2021    Menorrhagia

## 2025-01-17 LAB — CYTOLOGY REPORT: NORMAL

## 2025-01-24 DIAGNOSIS — Z01.419 ENCOUNTER FOR ANNUAL ROUTINE GYNECOLOGICAL EXAMINATION: ICD-10-CM

## 2025-01-27 DIAGNOSIS — Z01.419 ENCOUNTER FOR ANNUAL ROUTINE GYNECOLOGICAL EXAMINATION: ICD-10-CM

## 2025-01-27 NOTE — TELEPHONE ENCOUNTER
Medication Request/Refill Telephone Documentation:  Medication Requested: elinest   Provider last filled by: tracee  Last visit: 1/8/25  Last annual/pap smear: 1/8/25  NEXT APPT:

## 2025-01-28 RX ORDER — NORGESTREL AND ETHINYL ESTRADIOL 0.3-0.03MG
1 KIT ORAL DAILY
Qty: 28 TABLET | Refills: 0 | Status: SHIPPED | OUTPATIENT
Start: 2025-01-28

## 2025-06-04 ENCOUNTER — HOSPITAL ENCOUNTER (OUTPATIENT)
Age: 30
Setting detail: SPECIMEN
Discharge: HOME | End: 2025-06-04

## 2025-06-04 DIAGNOSIS — E78.5 HYPERLIPIDEMIA, UNSPECIFIED HYPERLIPIDEMIA TYPE: ICD-10-CM

## 2025-06-04 DIAGNOSIS — L65.9 HAIR LOSS: ICD-10-CM

## 2025-06-04 LAB
ALBUMIN SERPL-MCNC: 4.2 G/DL (ref 3.5–5.2)
ALBUMIN/GLOB SERPL: 1.8 {RATIO} (ref 1–2.5)
ALP SERPL-CCNC: 65 U/L (ref 35–104)
ALT SERPL-CCNC: 17 U/L (ref 10–35)
ANION GAP SERPL CALCULATED.3IONS-SCNC: 11 MMOL/L (ref 9–16)
AST SERPL-CCNC: 16 U/L (ref 10–35)
BASOPHILS # BLD: 0.06 K/UL (ref 0–0.2)
BASOPHILS NFR BLD: 1 % (ref 0–2)
BILIRUB SERPL-MCNC: 0.2 MG/DL (ref 0–1.2)
BUN SERPL-MCNC: 17 MG/DL (ref 6–20)
CALCIUM SERPL-MCNC: 9.1 MG/DL (ref 8.6–10.4)
CHLORIDE SERPL-SCNC: 106 MMOL/L (ref 98–107)
CHOLEST SERPL-MCNC: 176 MG/DL (ref 0–199)
CHOLESTEROL/HDL RATIO: 2.3
CO2 SERPL-SCNC: 23 MMOL/L (ref 20–31)
CREAT SERPL-MCNC: 0.8 MG/DL (ref 0.6–0.9)
EOSINOPHIL # BLD: 0.09 K/UL (ref 0–0.44)
EOSINOPHILS RELATIVE PERCENT: 1 % (ref 1–4)
ERYTHROCYTE [DISTWIDTH] IN BLOOD BY AUTOMATED COUNT: 13.2 % (ref 11.8–14.4)
GFR, ESTIMATED: >90 ML/MIN/1.73M2
GLUCOSE SERPL-MCNC: 78 MG/DL (ref 74–99)
HCT VFR BLD AUTO: 43.4 % (ref 36.3–47.1)
HDLC SERPL-MCNC: 75 MG/DL
HGB BLD-MCNC: 14.1 G/DL (ref 11.9–15.1)
IMM GRANULOCYTES # BLD AUTO: <0.03 K/UL (ref 0–0.3)
IMM GRANULOCYTES NFR BLD: 0 %
LDLC SERPL CALC-MCNC: 90 MG/DL (ref 0–100)
LYMPHOCYTES NFR BLD: 2.79 K/UL (ref 1.1–3.7)
LYMPHOCYTES RELATIVE PERCENT: 33 % (ref 24–43)
MCH RBC QN AUTO: 29.7 PG (ref 25.2–33.5)
MCHC RBC AUTO-ENTMCNC: 32.5 G/DL (ref 28.4–34.8)
MCV RBC AUTO: 91.4 FL (ref 82.6–102.9)
MONOCYTES NFR BLD: 0.61 K/UL (ref 0.1–1.2)
MONOCYTES NFR BLD: 7 % (ref 3–12)
NEUTROPHILS NFR BLD: 58 % (ref 36–65)
NEUTS SEG NFR BLD: 4.92 K/UL (ref 1.5–8.1)
NRBC BLD-RTO: 0 PER 100 WBC
PLATELET # BLD AUTO: 365 K/UL (ref 138–453)
PMV BLD AUTO: 10.3 FL (ref 8.1–13.5)
POTASSIUM SERPL-SCNC: 4.3 MMOL/L (ref 3.7–5.3)
PROT SERPL-MCNC: 6.5 G/DL (ref 6.6–8.7)
RBC # BLD AUTO: 4.75 M/UL (ref 3.95–5.11)
SODIUM SERPL-SCNC: 140 MMOL/L (ref 136–145)
T4 FREE SERPL-MCNC: 1.1 NG/DL (ref 0.92–1.68)
TRIGL SERPL-MCNC: 55 MG/DL (ref 0–149)
TSH SERPL DL<=0.05 MIU/L-ACNC: 1.32 UIU/ML (ref 0.27–4.2)
VLDLC SERPL CALC-MCNC: 11 MG/DL (ref 1–30)
WBC OTHER # BLD: 8.5 K/UL (ref 3.5–11.3)

## 2025-06-09 ENCOUNTER — HOSPITAL ENCOUNTER (EMERGENCY)
Age: 30
Discharge: HOME | End: 2025-06-09
Attending: EMERGENCY MEDICINE
Payer: OTHER MISCELLANEOUS

## 2025-06-09 ENCOUNTER — APPOINTMENT (OUTPATIENT)
Dept: GENERAL RADIOLOGY | Age: 30
End: 2025-06-09
Payer: OTHER MISCELLANEOUS

## 2025-06-09 VITALS
DIASTOLIC BLOOD PRESSURE: 88 MMHG | HEART RATE: 79 BPM | BODY MASS INDEX: 24.54 KG/M2 | SYSTOLIC BLOOD PRESSURE: 163 MMHG | RESPIRATION RATE: 16 BRPM | OXYGEN SATURATION: 99 % | TEMPERATURE: 98.6 F | HEIGHT: 60 IN | WEIGHT: 125 LBS

## 2025-06-09 DIAGNOSIS — S39.012A LUMBAR STRAIN, INITIAL ENCOUNTER: Primary | ICD-10-CM

## 2025-06-09 LAB — HCG UR QL: NEGATIVE

## 2025-06-09 PROCEDURE — 72072 X-RAY EXAM THORAC SPINE 3VWS: CPT

## 2025-06-09 PROCEDURE — 81025 URINE PREGNANCY TEST: CPT

## 2025-06-09 PROCEDURE — 72100 X-RAY EXAM L-S SPINE 2/3 VWS: CPT

## 2025-06-09 PROCEDURE — 99284 EMERGENCY DEPT VISIT MOD MDM: CPT

## 2025-06-09 RX ORDER — IBUPROFEN 600 MG/1
600 TABLET, FILM COATED ORAL EVERY 6 HOURS PRN
Qty: 28 TABLET | Refills: 0 | Status: SHIPPED | OUTPATIENT
Start: 2025-06-09 | End: 2025-06-16

## 2025-06-09 RX ORDER — LIDOCAINE 50 MG/G
OINTMENT TOPICAL
Qty: 35 G | Refills: 0 | Status: SHIPPED | OUTPATIENT
Start: 2025-06-09

## 2025-06-09 RX ORDER — CYCLOBENZAPRINE HCL 10 MG
10 TABLET ORAL 3 TIMES DAILY PRN
Qty: 21 TABLET | Refills: 0 | Status: SHIPPED | OUTPATIENT
Start: 2025-06-09 | End: 2025-06-16

## 2025-06-09 ASSESSMENT — ENCOUNTER SYMPTOMS
DIARRHEA: 0
VOMITING: 0
SORE THROAT: 0
ABDOMINAL PAIN: 0
COLOR CHANGE: 0
SINUS PRESSURE: 0
NAUSEA: 0
EYE PAIN: 0
SHORTNESS OF BREATH: 0
COUGH: 0
CHEST TIGHTNESS: 0
RHINORRHEA: 0
TROUBLE SWALLOWING: 0
EYE REDNESS: 0
WHEEZING: 0
FACIAL SWELLING: 0
CONSTIPATION: 0
BLOOD IN STOOL: 0
BACK PAIN: 1
EYE DISCHARGE: 0

## 2025-06-09 ASSESSMENT — LIFESTYLE VARIABLES
HOW MANY STANDARD DRINKS CONTAINING ALCOHOL DO YOU HAVE ON A TYPICAL DAY: PATIENT DOES NOT DRINK
HOW OFTEN DO YOU HAVE A DRINK CONTAINING ALCOHOL: NEVER
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
## 2025-07-02 ENCOUNTER — HOSPITAL ENCOUNTER (OUTPATIENT)
Age: 30
Setting detail: SPECIMEN
Discharge: HOME | End: 2025-07-02

## 2025-07-02 DIAGNOSIS — Z00.00 ENCOUNTER FOR WELL ADULT EXAM WITHOUT ABNORMAL FINDINGS: ICD-10-CM

## 2025-07-02 DIAGNOSIS — Z02.1 PHYSICAL EXAM, PRE-EMPLOYMENT: ICD-10-CM

## 2025-07-03 LAB
GAMMA INTERFERON BACKGROUND BLD IA-ACNC: 0.07 IU/ML
M TB IFN-G BLD-IMP: NEGATIVE IU/ML
M TB IFN-G CD4+ BCKGRND COR BLD-ACNC: 0.31 IU/ML (ref 0–0.34)
M TB IFN-G CD4+CD8+ BCKGRND COR BLD-ACNC: 0.22 IU/ML (ref 0–0.34)
MITOGEN IGNF BCKGRD COR BLD-ACNC: 9.93 IU/ML

## 2025-07-15 ENCOUNTER — OFFICE VISIT (OUTPATIENT)
Dept: ORTHOPEDIC SURGERY | Age: 30
End: 2025-07-15
Payer: COMMERCIAL

## 2025-07-15 VITALS — BODY MASS INDEX: 23.16 KG/M2 | WEIGHT: 118 LBS | RESPIRATION RATE: 14 BRPM | HEIGHT: 60 IN
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
## 2025-07-15 DIAGNOSIS — M54.50 ACUTE MIDLINE LOW BACK PAIN WITHOUT SCIATICA: ICD-10-CM

## 2025-07-15 DIAGNOSIS — M54.2 NECK PAIN: Primary | ICD-10-CM

## 2025-07-15 PROCEDURE — 99203 OFFICE O/P NEW LOW 30 MIN: CPT | Performed by: ORTHOPAEDIC SURGERY

## 2025-07-15 PROCEDURE — G8420 CALC BMI NORM PARAMETERS: HCPCS | Performed by: ORTHOPAEDIC SURGERY

## 2025-07-15 PROCEDURE — G8428 CUR MEDS NOT DOCUMENT: HCPCS | Performed by: ORTHOPAEDIC SURGERY

## 2025-07-15 PROCEDURE — 1036F TOBACCO NON-USER: CPT | Performed by: ORTHOPAEDIC SURGERY
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
## 2025-07-25 ENCOUNTER — HOSPITAL ENCOUNTER (OUTPATIENT)
Dept: PHYSICAL THERAPY | Age: 30
Setting detail: THERAPIES SERIES
Discharge: HOME | End: 2025-07-25
Attending: STUDENT IN AN ORGANIZED HEALTH CARE EDUCATION/TRAINING PROGRAM
Payer: COMMERCIAL

## 2025-07-25 PROCEDURE — 97162 PT EVAL MOD COMPLEX 30 MIN: CPT

## 2025-07-25 NOTE — CONSULTS
Other              TOTAL TREATMENT TIME: 39 mins    Time in:  901 am      Time out:  1003 am    Electronically signed by: Nori Melgoza PT
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
## 2025-07-30 ENCOUNTER — HOSPITAL ENCOUNTER (OUTPATIENT)
Dept: PHYSICAL THERAPY | Age: 30
Setting detail: THERAPIES SERIES
Discharge: HOME | End: 2025-07-30
Attending: STUDENT IN AN ORGANIZED HEALTH CARE EDUCATION/TRAINING PROGRAM
Payer: COMMERCIAL

## 2025-07-30 PROCEDURE — 97113 AQUATIC THERAPY/EXERCISES: CPT

## 2025-08-01 ENCOUNTER — HOSPITAL ENCOUNTER (OUTPATIENT)
Dept: PHYSICAL THERAPY | Age: 30
Setting detail: THERAPIES SERIES
Discharge: HOME | End: 2025-08-01
Attending: STUDENT IN AN ORGANIZED HEALTH CARE EDUCATION/TRAINING PROGRAM
Payer: COMMERCIAL

## 2025-08-01 ENCOUNTER — APPOINTMENT (OUTPATIENT)
Dept: PHYSICAL THERAPY | Age: 30
End: 2025-08-01
Attending: STUDENT IN AN ORGANIZED HEALTH CARE EDUCATION/TRAINING PROGRAM
Payer: COMMERCIAL

## 2025-08-01 PROCEDURE — 97113 AQUATIC THERAPY/EXERCISES: CPT
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
## 2025-08-08 ENCOUNTER — HOSPITAL ENCOUNTER (OUTPATIENT)
Dept: PHYSICAL THERAPY | Age: 30
Setting detail: THERAPIES SERIES
End: 2025-08-08
Attending: STUDENT IN AN ORGANIZED HEALTH CARE EDUCATION/TRAINING PROGRAM
Payer: COMMERCIAL

## 2025-08-11 ENCOUNTER — APPOINTMENT (OUTPATIENT)
Dept: PHYSICAL THERAPY | Age: 30
End: 2025-08-11
Attending: STUDENT IN AN ORGANIZED HEALTH CARE EDUCATION/TRAINING PROGRAM
Payer: COMMERCIAL

## 2025-08-13 ENCOUNTER — HOSPITAL ENCOUNTER (OUTPATIENT)
Dept: PHYSICAL THERAPY | Age: 30
Setting detail: THERAPIES SERIES
Discharge: HOME | End: 2025-08-13
Attending: STUDENT IN AN ORGANIZED HEALTH CARE EDUCATION/TRAINING PROGRAM
Payer: COMMERCIAL

## 2025-08-18 ENCOUNTER — APPOINTMENT (OUTPATIENT)
Dept: PHYSICAL THERAPY | Age: 30
End: 2025-08-18
Attending: STUDENT IN AN ORGANIZED HEALTH CARE EDUCATION/TRAINING PROGRAM
Payer: COMMERCIAL

## 2025-08-20 ENCOUNTER — HOSPITAL ENCOUNTER (OUTPATIENT)
Dept: PHYSICAL THERAPY | Age: 30
Setting detail: THERAPIES SERIES
Discharge: HOME | End: 2025-08-20
Attending: STUDENT IN AN ORGANIZED HEALTH CARE EDUCATION/TRAINING PROGRAM
Payer: COMMERCIAL

## 2025-08-20 PROCEDURE — 97112 NEUROMUSCULAR REEDUCATION: CPT

## 2025-08-20 PROCEDURE — 97530 THERAPEUTIC ACTIVITIES: CPT

## 2025-08-20 PROCEDURE — 97113 AQUATIC THERAPY/EXERCISES: CPT

## 2025-08-21 ENCOUNTER — HOSPITAL ENCOUNTER (OUTPATIENT)
Age: 30
Setting detail: SPECIMEN
Discharge: HOME | End: 2025-08-21

## 2025-08-21 DIAGNOSIS — Z00.00 ENCOUNTER FOR WELL ADULT EXAM WITHOUT ABNORMAL FINDINGS: ICD-10-CM

## 2025-08-23 LAB
AMPHETAMINE: NEGATIVE
BARBITURATES: NEGATIVE
BENZODIAZEPINES: NEGATIVE
BUPRENORPHINE: NEGATIVE
COCAINE: NEGATIVE
DRUGS OF ABUSE COMMENT: NORMAL
METHADONE: NEGATIVE
METHAMPHETAMINE: NEGATIVE
OPIATES: NEGATIVE
OXYCODONE: NEGATIVE
PHENCYCLIDINE: NEGATIVE
THC: NEGATIVE

## 2025-08-27 ENCOUNTER — HOSPITAL ENCOUNTER (OUTPATIENT)
Dept: PHYSICAL THERAPY | Age: 30
Setting detail: THERAPIES SERIES
Discharge: HOME | End: 2025-08-27
Attending: STUDENT IN AN ORGANIZED HEALTH CARE EDUCATION/TRAINING PROGRAM
Payer: COMMERCIAL

## 2025-08-27 PROCEDURE — 97140 MANUAL THERAPY 1/> REGIONS: CPT

## 2025-08-27 PROCEDURE — 97110 THERAPEUTIC EXERCISES: CPT
